# Patient Record
Sex: FEMALE | Race: WHITE | Employment: FULL TIME | URBAN - METROPOLITAN AREA
[De-identification: names, ages, dates, MRNs, and addresses within clinical notes are randomized per-mention and may not be internally consistent; named-entity substitution may affect disease eponyms.]

---

## 2017-01-05 ENCOUNTER — OFFICE VISIT (OUTPATIENT)
Dept: FAMILY MEDICINE CLINIC | Facility: CLINIC | Age: 32
End: 2017-01-05

## 2017-01-05 VITALS
SYSTOLIC BLOOD PRESSURE: 134 MMHG | DIASTOLIC BLOOD PRESSURE: 88 MMHG | WEIGHT: 293 LBS | BODY MASS INDEX: 57.75 KG/M2 | HEART RATE: 84 BPM

## 2017-01-05 DIAGNOSIS — E11.9 CONTROLLED TYPE 2 DIABETES MELLITUS WITHOUT COMPLICATION, WITHOUT LONG-TERM CURRENT USE OF INSULIN (HCC): Primary | ICD-10-CM

## 2017-01-05 DIAGNOSIS — Z30.42 ENCOUNTER FOR SURVEILLANCE OF INJECTABLE CONTRACEPTIVE: ICD-10-CM

## 2017-01-05 DIAGNOSIS — E66.01 MORBID OBESITY DUE TO EXCESS CALORIES (HCC): ICD-10-CM

## 2017-01-05 DIAGNOSIS — M54.50 ACUTE RIGHT-SIDED LOW BACK PAIN WITHOUT SCIATICA: ICD-10-CM

## 2017-01-05 DIAGNOSIS — F33.42 RECURRENT MAJOR DEPRESSIVE DISORDER, IN FULL REMISSION (HCC): ICD-10-CM

## 2017-01-05 LAB
ALBUMIN SERPL-MCNC: NORMAL G/DL
ALP BLD-CCNC: NORMAL U/L
ALT SERPL-CCNC: NORMAL U/L
AST SERPL-CCNC: NORMAL U/L
BILIRUB SERPL-MCNC: NORMAL MG/DL (ref 0.1–1.4)
BUN BLDV-MCNC: NORMAL MG/DL
CALCIUM SERPL-MCNC: NORMAL MG/DL
CHLORIDE BLD-SCNC: NORMAL MMOL/L
CHOLESTEROL, TOTAL: NORMAL MG/DL
CHOLESTEROL/HDL RATIO: NORMAL
CO2: NORMAL MMOL/L
CREAT SERPL-MCNC: NORMAL MG/DL
GFR CALCULATED: NORMAL
GLUCOSE BLD-MCNC: NORMAL MG/DL
HBA1C MFR BLD: 8.8 %
HDLC SERPL-MCNC: NORMAL MG/DL (ref 35–70)
LDL CHOLESTEROL CALCULATED: NORMAL MG/DL (ref 0–160)
POTASSIUM SERPL-SCNC: NORMAL MMOL/L
SODIUM BLD-SCNC: NORMAL MMOL/L
TOTAL PROTEIN: NORMAL
TRIGL SERPL-MCNC: NORMAL MG/DL
TSH SERPL DL<=0.05 MIU/L-ACNC: NORMAL UIU/ML
VLDLC SERPL CALC-MCNC: NORMAL MG/DL

## 2017-01-05 PROCEDURE — 99214 OFFICE O/P EST MOD 30 MIN: CPT | Performed by: FAMILY MEDICINE

## 2017-01-05 PROCEDURE — 83036 HEMOGLOBIN GLYCOSYLATED A1C: CPT | Performed by: FAMILY MEDICINE

## 2017-01-05 RX ORDER — TRAMADOL HYDROCHLORIDE 50 MG/1
TABLET ORAL
Qty: 60 TABLET | Refills: 0 | Status: SHIPPED | OUTPATIENT
Start: 2017-01-05 | End: 2019-04-25 | Stop reason: ALTCHOICE

## 2017-01-05 ASSESSMENT — ENCOUNTER SYMPTOMS
BACK PAIN: 1
COUGH: 0
BLOOD IN STOOL: 0
ABDOMINAL PAIN: 0
SHORTNESS OF BREATH: 0
EYES NEGATIVE: 1
BLURRED VISION: 0

## 2017-01-23 ENCOUNTER — TELEPHONE (OUTPATIENT)
Dept: FAMILY MEDICINE CLINIC | Facility: CLINIC | Age: 32
End: 2017-01-23

## 2017-01-23 DIAGNOSIS — E66.01 MORBID OBESITY DUE TO EXCESS CALORIES (HCC): ICD-10-CM

## 2017-01-23 DIAGNOSIS — E11.9 CONTROLLED TYPE 2 DIABETES MELLITUS WITHOUT COMPLICATION, WITHOUT LONG-TERM CURRENT USE OF INSULIN (HCC): ICD-10-CM

## 2017-02-07 ENCOUNTER — HOSPITAL ENCOUNTER (OUTPATIENT)
Dept: PHYSICAL THERAPY | Facility: CLINIC | Age: 32
Setting detail: THERAPIES SERIES
Discharge: HOME | End: 2017-02-07
Payer: COMMERCIAL

## 2017-02-07 PROCEDURE — 97110 THERAPEUTIC EXERCISES: CPT

## 2017-02-09 ENCOUNTER — HOSPITAL ENCOUNTER (OUTPATIENT)
Dept: PHYSICAL THERAPY | Facility: CLINIC | Age: 32
Setting detail: THERAPIES SERIES
Discharge: HOME | End: 2017-02-09
Payer: COMMERCIAL

## 2017-02-09 PROCEDURE — 97110 THERAPEUTIC EXERCISES: CPT

## 2017-02-14 ENCOUNTER — HOSPITAL ENCOUNTER (OUTPATIENT)
Dept: PHYSICAL THERAPY | Facility: CLINIC | Age: 32
Setting detail: THERAPIES SERIES
Discharge: HOME | End: 2017-02-14
Payer: COMMERCIAL

## 2017-02-16 ENCOUNTER — HOSPITAL ENCOUNTER (OUTPATIENT)
Dept: PHYSICAL THERAPY | Facility: CLINIC | Age: 32
Setting detail: THERAPIES SERIES
Discharge: HOME | End: 2017-02-16
Payer: COMMERCIAL

## 2017-02-16 PROCEDURE — 97110 THERAPEUTIC EXERCISES: CPT

## 2017-02-21 ENCOUNTER — HOSPITAL ENCOUNTER (OUTPATIENT)
Dept: PHYSICAL THERAPY | Facility: CLINIC | Age: 32
Setting detail: THERAPIES SERIES
Discharge: HOME | End: 2017-02-21
Payer: COMMERCIAL

## 2017-02-21 PROCEDURE — 97110 THERAPEUTIC EXERCISES: CPT

## 2017-02-23 ENCOUNTER — HOSPITAL ENCOUNTER (OUTPATIENT)
Dept: PHYSICAL THERAPY | Facility: CLINIC | Age: 32
Setting detail: THERAPIES SERIES
Discharge: HOME | End: 2017-02-23
Payer: COMMERCIAL

## 2017-02-23 PROCEDURE — 97110 THERAPEUTIC EXERCISES: CPT

## 2017-02-28 ENCOUNTER — APPOINTMENT (OUTPATIENT)
Dept: PHYSICAL THERAPY | Facility: CLINIC | Age: 32
End: 2017-02-28
Payer: COMMERCIAL

## 2017-05-05 ENCOUNTER — OFFICE VISIT (OUTPATIENT)
Dept: FAMILY MEDICINE CLINIC | Age: 32
End: 2017-05-05
Payer: COMMERCIAL

## 2017-05-05 VITALS
BODY MASS INDEX: 51.91 KG/M2 | SYSTOLIC BLOOD PRESSURE: 124 MMHG | HEART RATE: 80 BPM | DIASTOLIC BLOOD PRESSURE: 88 MMHG | HEIGHT: 63 IN | WEIGHT: 293 LBS

## 2017-05-05 DIAGNOSIS — E11.9 TYPE 2 DIABETES MELLITUS WITHOUT COMPLICATION, WITHOUT LONG-TERM CURRENT USE OF INSULIN (HCC): ICD-10-CM

## 2017-05-05 DIAGNOSIS — G43.709 CHRONIC MIGRAINE WITHOUT AURA WITHOUT STATUS MIGRAINOSUS, NOT INTRACTABLE: ICD-10-CM

## 2017-05-05 DIAGNOSIS — E11.9 CONTROLLED TYPE 2 DIABETES MELLITUS WITHOUT COMPLICATION, WITHOUT LONG-TERM CURRENT USE OF INSULIN (HCC): Primary | ICD-10-CM

## 2017-05-05 DIAGNOSIS — E66.01 MORBID OBESITY DUE TO EXCESS CALORIES (HCC): ICD-10-CM

## 2017-05-05 DIAGNOSIS — J30.2 SEASONAL ALLERGIC RHINITIS, UNSPECIFIED ALLERGIC RHINITIS TRIGGER: ICD-10-CM

## 2017-05-05 DIAGNOSIS — F34.1 DYSTHYMIA: ICD-10-CM

## 2017-05-05 LAB
CREATININE URINE POCT: NORMAL
HBA1C MFR BLD: 7.9 %
MICROALBUMIN/CREAT 24H UR: NORMAL MG/G{CREAT}
MICROALBUMIN/CREAT UR-RTO: NORMAL

## 2017-05-05 PROCEDURE — 82044 UR ALBUMIN SEMIQUANTITATIVE: CPT | Performed by: FAMILY MEDICINE

## 2017-05-05 PROCEDURE — 99214 OFFICE O/P EST MOD 30 MIN: CPT | Performed by: FAMILY MEDICINE

## 2017-05-05 PROCEDURE — 83036 HEMOGLOBIN GLYCOSYLATED A1C: CPT | Performed by: FAMILY MEDICINE

## 2017-05-05 RX ORDER — BUPROPION HYDROCHLORIDE 300 MG/1
300 TABLET ORAL DAILY
Qty: 30 TABLET | Refills: 3 | Status: SHIPPED | OUTPATIENT
Start: 2017-05-05 | End: 2018-01-09 | Stop reason: SDUPTHER

## 2017-05-05 ASSESSMENT — ENCOUNTER SYMPTOMS
SHORTNESS OF BREATH: 0
COUGH: 0
EYES NEGATIVE: 1
BLOOD IN STOOL: 0
ABDOMINAL PAIN: 0

## 2017-09-08 ENCOUNTER — OFFICE VISIT (OUTPATIENT)
Dept: FAMILY MEDICINE CLINIC | Age: 32
End: 2017-09-08
Payer: COMMERCIAL

## 2017-09-08 VITALS
HEART RATE: 90 BPM | BODY MASS INDEX: 57.04 KG/M2 | SYSTOLIC BLOOD PRESSURE: 130 MMHG | DIASTOLIC BLOOD PRESSURE: 90 MMHG | WEIGHT: 293 LBS

## 2017-09-08 DIAGNOSIS — M25.531 PAIN IN BOTH WRISTS: ICD-10-CM

## 2017-09-08 DIAGNOSIS — E66.01 MORBID OBESITY DUE TO EXCESS CALORIES (HCC): ICD-10-CM

## 2017-09-08 DIAGNOSIS — E11.9 CONTROLLED TYPE 2 DIABETES MELLITUS WITHOUT COMPLICATION, WITHOUT LONG-TERM CURRENT USE OF INSULIN (HCC): Primary | ICD-10-CM

## 2017-09-08 DIAGNOSIS — Z23 NEED FOR PNEUMOCOCCAL VACCINATION: ICD-10-CM

## 2017-09-08 DIAGNOSIS — Z23 NEED FOR INFLUENZA VACCINATION: ICD-10-CM

## 2017-09-08 DIAGNOSIS — G47.33 OBSTRUCTIVE SLEEP APNEA SYNDROME: ICD-10-CM

## 2017-09-08 DIAGNOSIS — M25.532 PAIN IN BOTH WRISTS: ICD-10-CM

## 2017-09-08 DIAGNOSIS — F34.1 DYSTHYMIA: ICD-10-CM

## 2017-09-08 LAB — HBA1C MFR BLD: 7.6 %

## 2017-09-08 PROCEDURE — 83036 HEMOGLOBIN GLYCOSYLATED A1C: CPT | Performed by: FAMILY MEDICINE

## 2017-09-08 PROCEDURE — 90471 IMMUNIZATION ADMIN: CPT | Performed by: FAMILY MEDICINE

## 2017-09-08 PROCEDURE — 90670 PCV13 VACCINE IM: CPT | Performed by: FAMILY MEDICINE

## 2017-09-08 PROCEDURE — 90472 IMMUNIZATION ADMIN EACH ADD: CPT | Performed by: FAMILY MEDICINE

## 2017-09-08 PROCEDURE — 90688 IIV4 VACCINE SPLT 0.5 ML IM: CPT | Performed by: FAMILY MEDICINE

## 2017-09-08 PROCEDURE — 99214 OFFICE O/P EST MOD 30 MIN: CPT | Performed by: FAMILY MEDICINE

## 2017-09-08 RX ORDER — LISINOPRIL 5 MG/1
5 TABLET ORAL DAILY
Qty: 30 TABLET | Refills: 5 | Status: SHIPPED | OUTPATIENT
Start: 2017-09-08 | End: 2018-09-01 | Stop reason: SDUPTHER

## 2017-09-08 ASSESSMENT — ENCOUNTER SYMPTOMS
EYES NEGATIVE: 1
CONSTIPATION: 0
BACK PAIN: 0
SHORTNESS OF BREATH: 0
COUGH: 0
ABDOMINAL PAIN: 0

## 2017-09-08 ASSESSMENT — PATIENT HEALTH QUESTIONNAIRE - PHQ9
1. LITTLE INTEREST OR PLEASURE IN DOING THINGS: 0
SUM OF ALL RESPONSES TO PHQ9 QUESTIONS 1 & 2: 0
2. FEELING DOWN, DEPRESSED OR HOPELESS: 0
SUM OF ALL RESPONSES TO PHQ QUESTIONS 1-9: 0

## 2018-01-09 ENCOUNTER — OFFICE VISIT (OUTPATIENT)
Dept: FAMILY MEDICINE CLINIC | Age: 33
End: 2018-01-09
Payer: COMMERCIAL

## 2018-01-09 VITALS
DIASTOLIC BLOOD PRESSURE: 84 MMHG | HEART RATE: 88 BPM | WEIGHT: 293 LBS | SYSTOLIC BLOOD PRESSURE: 128 MMHG | BODY MASS INDEX: 56.69 KG/M2

## 2018-01-09 DIAGNOSIS — E11.9 TYPE 2 DIABETES MELLITUS WITHOUT COMPLICATION, WITHOUT LONG-TERM CURRENT USE OF INSULIN (HCC): Primary | ICD-10-CM

## 2018-01-09 DIAGNOSIS — E66.01 MORBID OBESITY DUE TO EXCESS CALORIES (HCC): ICD-10-CM

## 2018-01-09 DIAGNOSIS — G47.33 OBSTRUCTIVE SLEEP APNEA SYNDROME: ICD-10-CM

## 2018-01-09 DIAGNOSIS — F33.42 RECURRENT MAJOR DEPRESSIVE DISORDER, IN FULL REMISSION (HCC): ICD-10-CM

## 2018-01-09 DIAGNOSIS — J30.2 CHRONIC SEASONAL ALLERGIC RHINITIS, UNSPECIFIED TRIGGER: ICD-10-CM

## 2018-01-09 LAB
ALBUMIN SERPL-MCNC: 4.2 G/DL
ALP BLD-CCNC: 81 U/L
ALT SERPL-CCNC: 95 U/L
ANION GAP SERPL CALCULATED.3IONS-SCNC: 8 MMOL/L
AST SERPL-CCNC: 44 U/L
BILIRUB SERPL-MCNC: 0.5 MG/DL (ref 0.1–1.4)
BUN BLDV-MCNC: 18 MG/DL
CALCIUM SERPL-MCNC: 9.5 MG/DL
CHLORIDE BLD-SCNC: 100 MMOL/L
CHOLESTEROL, TOTAL: 233 MG/DL
CHOLESTEROL/HDL RATIO: 5.5
CO2: 28 MMOL/L
CREAT SERPL-MCNC: 0.88 MG/DL
GFR CALCULATED: NORMAL
GLUCOSE BLD-MCNC: 285 MG/DL
HBA1C MFR BLD: 9 %
HDLC SERPL-MCNC: 42 MG/DL (ref 35–70)
LDL CHOLESTEROL CALCULATED: 138 MG/DL (ref 0–160)
POTASSIUM SERPL-SCNC: 4.1 MMOL/L
SODIUM BLD-SCNC: 136 MMOL/L
TOTAL PROTEIN: 6.7
TRIGL SERPL-MCNC: 263 MG/DL
TSH SERPL DL<=0.05 MIU/L-ACNC: 1.74 UIU/ML
VLDLC SERPL CALC-MCNC: 53 MG/DL

## 2018-01-09 PROCEDURE — G8417 CALC BMI ABV UP PARAM F/U: HCPCS | Performed by: FAMILY MEDICINE

## 2018-01-09 PROCEDURE — 99214 OFFICE O/P EST MOD 30 MIN: CPT | Performed by: FAMILY MEDICINE

## 2018-01-09 PROCEDURE — G8427 DOCREV CUR MEDS BY ELIG CLIN: HCPCS | Performed by: FAMILY MEDICINE

## 2018-01-09 PROCEDURE — 36415 COLL VENOUS BLD VENIPUNCTURE: CPT | Performed by: FAMILY MEDICINE

## 2018-01-09 PROCEDURE — 3045F PR MOST RECENT HEMOGLOBIN A1C LEVEL 7.0-9.0%: CPT | Performed by: FAMILY MEDICINE

## 2018-01-09 PROCEDURE — 83036 HEMOGLOBIN GLYCOSYLATED A1C: CPT | Performed by: FAMILY MEDICINE

## 2018-01-09 PROCEDURE — G8484 FLU IMMUNIZE NO ADMIN: HCPCS | Performed by: FAMILY MEDICINE

## 2018-01-09 PROCEDURE — 1036F TOBACCO NON-USER: CPT | Performed by: FAMILY MEDICINE

## 2018-01-09 RX ORDER — METFORMIN HYDROCHLORIDE 500 MG/1
1000 TABLET, EXTENDED RELEASE ORAL 2 TIMES DAILY
Qty: 120 TABLET | Refills: 3 | Status: SHIPPED | OUTPATIENT
Start: 2018-01-09 | End: 2018-08-10 | Stop reason: CLARIF

## 2018-01-09 RX ORDER — BUPROPION HYDROCHLORIDE 300 MG/1
300 TABLET ORAL DAILY
Qty: 30 TABLET | Refills: 5 | Status: SHIPPED | OUTPATIENT
Start: 2018-01-09 | End: 2018-10-31 | Stop reason: SDUPTHER

## 2018-01-09 ASSESSMENT — ENCOUNTER SYMPTOMS
ABDOMINAL PAIN: 0
BACK PAIN: 1
DIARRHEA: 0
COUGH: 0
VISUAL CHANGE: 0
SHORTNESS OF BREATH: 0
BLOOD IN STOOL: 0
EYES NEGATIVE: 1

## 2018-01-09 NOTE — PATIENT INSTRUCTIONS
of the reach of children, never share your medicines with others, and use this medication only for the indication prescribed. Every effort has been made to ensure that the information provided by [address] is accurate, up-to-date, and complete, but no guarantee is made to that effect. Drug information contained herein may be time sensitive. [facility] information has been compiled for use by healthcare practitioners and consumers in the [place] and therefore [facility] does not warrant that uses outside of the [place] are appropriate, unless specifically indicated otherwise. [facility]'s drug information does not endorse drugs, diagnose patients or recommend therapy. [facility]'s drug information is an informational resource designed to assist licensed healthcare practitioners in caring for their patients and/or to serve consumers viewing this service as a supplement to, and not a substitute for, the expertise, skill, knowledge and judgment of healthcare practitioners. The absence of a warning for a given drug or drug combination in no way should be construed to indicate that the drug or drug combination is safe, effective or appropriate for any given patient. [facility] does not assume any responsibility for any aspect of healthcare administered with the aid of information [facility] provides. The information contained herein is not intended to cover all possible uses, directions, precautions, warnings, drug interactions, allergic reactions, or adverse effects. If you have questions about the drugs you are taking, check with your doctor, nurse or pharmacist.  Copyright 9780-2194 [address]. Version: 9.02. Revision date: 1/25/2017. Care instructions adapted under license by [facility] ([facility]). If you have questions about a medical condition or this instruction, always ask your healthcare professional. [place] [address] any warranty or liability for your use of this information.

## 2018-01-09 NOTE — PROGRESS NOTES
[facility] PHYSICIANS  IVAN CHAMBERLAIN [facility] PRACTICE  [address] [facility] [id number] [place] [address]  Dept: [phone]    1/9/2018    CHIEF COMPLAINT    Chief Complaint   Patient presents with    Diabetes       HPI    Carina Woodall is a 29 y.o. female who presents   Chief Complaint   Patient presents with    Diabetes   . Diabetes   She presents for her follow-up diabetic visit. She has type 2 diabetes mellitus. Her disease course has been stable. There are no hypoglycemic associated symptoms. Pertinent negatives for hypoglycemia include no dizziness or nervousness/anxiousness. Pertinent negatives for diabetes include no chest pain, no fatigue, no foot paresthesias, no visual change and no weight loss. There are no hypoglycemic complications. Symptoms are stable. There are no diabetic complications. Risk factors for coronary artery disease include diabetes mellitus, obesity and sedentary lifestyle. Current diabetic treatment includes oral agent (monotherapy). She is compliant with treatment some of the time. Her weight is stable. When asked about meal planning, she reported none. She has not had a previous visit with a dietitian. She participates in exercise intermittently. An ACE inhibitor/angiotensin II receptor blocker is being taken. She does not see a podiatrist.Eye exam is current. REVIEW OF SYSTEMS    Review of Systems   Constitutional: Negative for fatigue, fever, malaise/fatigue and weight loss. HENT: Negative. Eyes: Negative. Goes to eye dr   Respiratory: Negative for cough and shortness of breath. Cardiovascular: Negative for chest pain and leg swelling. Gastrointestinal: Negative for abdominal pain, blood in stool and diarrhea. Genitourinary: Negative for frequency and urgency. Musculoskeletal: Positive for back pain (intermittent). Skin: Negative. Neurological: Negative for dizziness and sensory change. Endo/Heme/Allergies: Negative. Psychiatric/Behavioral: Negative for depression. The patient is not nervous/anxious and does not have insomnia.          Mood stable on med       PAST MEDICAL HISTORY    Past Medical History:   Diagnosis Date    Depression     Diabetes mellitus ([address]     Migraines     Obesity     Seasonal allergies     Type 2 diabetes mellitus without complication (HCC)        FAMILY HISTORY    Family History   Problem Relation Age of Onset    High Blood Pressure Mother     Breast Cancer Paternal Aunt     Heart Attack Maternal Grandfather     High Blood Pressure Maternal Grandfather     Stroke Maternal Grandfather        SOCIAL HISTORY    Social History     Social History    Marital status:      Spouse name: N/A    Number of children: N/A    Years of education: N/A     Social History Main Topics    Smoking status: Former Smoker    Smokeless tobacco: Never Used    Alcohol use Yes      Comment: social    Drug use: No    Sexual activity: Yes     Partners: Male     Other Topics Concern    None     Social History Narrative    None       SURGICAL HISTORY    Past Surgical History:   Procedure Laterality Date     SECTION      WRIST GANGLION EXCISION      lt wrist age 7yrs       CURRENT MEDICATIONS    Current Outpatient Prescriptions   Medication Sig Dispense Refill    metFORMIN (GLUCOPHAGE-XR) 500 MG extended release tablet Take 2 tablets by mouth 2 times daily 120 tablet 3    SITagliptin (JANUVIA) 50 MG tablet Take 1 tablet by mouth daily 30 tablet 5    buPROPion (WELLBUTRIN XL) 300 MG extended release tablet Take 1 tablet by mouth daily Unsure of dose 30 tablet 5    lisinopril (PRINIVIL;ZESTRIL) 5 MG tablet Take 1 tablet by mouth daily 30 tablet 5    naproxen (NAPROSYN) 500 MG tablet TAKE ONE TABLET BY MOUTH TWICE A DAY WITH FOOD 60 tablet 2    Cetirizine HCl (ZYRTEC PO) Take by mouth daily      Butalbital-APAP-Caffeine (FIORICET PO) Take by mouth as needed      traMADol (ULTRAM) 50 MG tablet 1-2 every 6-8 hrs prn 60 tablet 0     No current facility-administered medications for this visit. ALLERGIES    Allergies   Allergen Reactions    Penicillins        PHYSICAL EXAM   Physical Exam   Constitutional: She is oriented to person, place, and time. She appears well-developed and well-nourished. Severely obese   HENT:   Head: Normocephalic. Mouth/Throat: Oropharynx is clear and moist.   Eyes: Pupils are equal, round, and reactive to light. Neck: Normal range of motion. Neck supple. No thyromegaly present. Cardiovascular: Normal rate, regular rhythm and normal heart sounds. No murmur heard. Pulmonary/Chest: Effort normal and breath sounds normal. She has no wheezes. She has no rales. Abdominal: Soft. There is no tenderness. There is no rebound and no guarding. obese   Musculoskeletal: Normal range of motion. She exhibits no edema, tenderness or deformity. Lymphadenopathy:     She has no cervical adenopathy. Neurological: She is alert and oriented to person, place, and time. Skin: Skin is warm and dry. Psychiatric: She has a normal mood and affect. Her behavior is normal.   Vitals reviewed. Assessment/PLan  1. Type 2 diabetes mellitus without complication, without long-term current use of insulin (HCC)  Chronic, not well controlled. Add [place] [facility] [place]. Encouraged getting back to healthy diet and regular exercise. - POCT glycosylated hemoglobin (Hb A1C)  - Lipid Panel; Future  - Comprehensive Metabolic Panel; Future  - metFORMIN (GLUCOPHAGE-XR) 500 MG extended release tablet; Take 2 tablets by mouth 2 times daily  Dispense: 120 tablet; Refill: 3  - SITagliptin (JANUVIA) 50 MG tablet; Take 1 tablet by mouth daily  Dispense: 30 tablet; Refill: 5    2. Morbid obesity due to excess calories (HCC)  Chronic, not well controlled. - Lipid Panel; Future  - Comprehensive Metabolic Panel; Future  - TSH with Reflex; Future    3.  Recurrent major depressive disorder, in full remission ([facility]  Chronic, stable on med. 4. Chronic seasonal allergic rhinitis, unspecified trigger  Chronic, cont zyrtec. 5. Obstructive sleep apnea syndrome  Chronic, cont cpap      Pao Dalal received counseling on the following healthy behaviors: nutrition, exercise and medication adherence  Reviewed prior labs and health maintenance  Continue current medications, diet and exercise. Discussed use, benefit, and side effects of prescribed medications. Barriers to medication compliance addressed. Patient given educational materials - see patient instructions  Was a self-tracking handout given in paper form or via [facility]? Yes    Requested Prescriptions     Signed Prescriptions Disp Refills    metFORMIN (GLUCOPHAGE-XR) 500 MG extended release tablet 120 tablet 3     Sig: Take 2 tablets by mouth 2 times daily    SITagliptin (JANUVIA) 50 MG tablet 30 tablet 5     Sig: Take 1 tablet by mouth daily    buPROPion (WELLBUTRIN XL) 300 MG extended release tablet 30 tablet 5     Sig: Take 1 tablet by mouth daily Unsure of dose       All patient questions answered. Patient voiced understanding. Quality Measures    Body mass index is 56.69 kg/m². Elevated. Weight control planned discussed Healthy diet and regular exercise. BP: 128/84 Blood pressure is normal. Treatment plan consists of Weight Reduction, Increased Physical Activity and No treatment change needed. No results found for: LDLCALC, LDLCHOLESTEROL, LDLDIRECT (goal LDL reduction with dx if diabetes is 50% LDL reduction)      PHQ Scores 9/8/2017   PHQ2 Score 0   PHQ9 Score 0     Interpretation of Total Score Depression Severity: 1-4 = Minimal depression, 5-9 = Mild depression, 10-14 = Moderate depression, 15-19 = Moderately severe depression, 20-27 = Severe depression     Return in about 4 months (around 5/9/2018) for Return for diabetes check, return for weight check.     Visual inspection:  Deformity/amputation: absent  Skin lesions/pre-ulcerative calluses: absent  Edema: right- negative, left- negative    Sensory exam:  Monofilament sensation: normal  (minimum of 5 random plantar locations tested, avoiding callused areas - > 1 area with absence of sensation is + for neuropathy)    Plus at least one of the following:  Pulses: normal,   Pinprick: Intact  Proprioception: Intact  Vibration (128 Hz): N/A    Electronically signed by Kiet Mejía MD on 1/9/18 at 9:55 AM

## 2018-01-18 ENCOUNTER — TELEPHONE (OUTPATIENT)
Dept: FAMILY MEDICINE CLINIC | Age: 33
End: 2018-01-18

## 2018-01-18 DIAGNOSIS — F51.01 PRIMARY INSOMNIA: Primary | ICD-10-CM

## 2018-01-18 RX ORDER — ZOLPIDEM TARTRATE 5 MG/1
5 TABLET ORAL NIGHTLY PRN
Qty: 30 TABLET | Refills: 0 | Status: SHIPPED | OUTPATIENT
Start: 2018-01-18 | End: 2018-02-01

## 2018-05-07 DIAGNOSIS — E66.01 MORBID OBESITY DUE TO EXCESS CALORIES (HCC): ICD-10-CM

## 2018-05-07 DIAGNOSIS — E11.9 TYPE 2 DIABETES MELLITUS WITHOUT COMPLICATION, WITHOUT LONG-TERM CURRENT USE OF INSULIN (HCC): ICD-10-CM

## 2018-05-10 ENCOUNTER — OFFICE VISIT (OUTPATIENT)
Dept: FAMILY MEDICINE CLINIC | Age: 33
End: 2018-05-10
Payer: COMMERCIAL

## 2018-05-10 VITALS
SYSTOLIC BLOOD PRESSURE: 110 MMHG | DIASTOLIC BLOOD PRESSURE: 82 MMHG | WEIGHT: 293 LBS | BODY MASS INDEX: 51.91 KG/M2 | HEIGHT: 63 IN | HEART RATE: 88 BPM

## 2018-05-10 DIAGNOSIS — E66.01 MORBID OBESITY DUE TO EXCESS CALORIES (HCC): ICD-10-CM

## 2018-05-10 DIAGNOSIS — E11.9 CONTROLLED TYPE 2 DIABETES MELLITUS WITHOUT COMPLICATION, WITHOUT LONG-TERM CURRENT USE OF INSULIN (HCC): Primary | ICD-10-CM

## 2018-05-10 DIAGNOSIS — F33.42 RECURRENT MAJOR DEPRESSIVE DISORDER, IN FULL REMISSION (HCC): ICD-10-CM

## 2018-05-10 LAB
GLUCOSE BLD-MCNC: 212 MG/DL
HBA1C MFR BLD: 9.4 %

## 2018-05-10 PROCEDURE — 3046F HEMOGLOBIN A1C LEVEL >9.0%: CPT | Performed by: FAMILY MEDICINE

## 2018-05-10 PROCEDURE — 2022F DILAT RTA XM EVC RTNOPTHY: CPT | Performed by: FAMILY MEDICINE

## 2018-05-10 PROCEDURE — G8417 CALC BMI ABV UP PARAM F/U: HCPCS | Performed by: FAMILY MEDICINE

## 2018-05-10 PROCEDURE — 83036 HEMOGLOBIN GLYCOSYLATED A1C: CPT | Performed by: FAMILY MEDICINE

## 2018-05-10 PROCEDURE — 1036F TOBACCO NON-USER: CPT | Performed by: FAMILY MEDICINE

## 2018-05-10 PROCEDURE — 82962 GLUCOSE BLOOD TEST: CPT | Performed by: FAMILY MEDICINE

## 2018-05-10 PROCEDURE — G8427 DOCREV CUR MEDS BY ELIG CLIN: HCPCS | Performed by: FAMILY MEDICINE

## 2018-05-10 PROCEDURE — 99214 OFFICE O/P EST MOD 30 MIN: CPT | Performed by: FAMILY MEDICINE

## 2018-05-10 ASSESSMENT — ENCOUNTER SYMPTOMS
SHORTNESS OF BREATH: 0
BLOOD IN STOOL: 0
EYES NEGATIVE: 1
ABDOMINAL PAIN: 0
COUGH: 0

## 2018-07-13 ENCOUNTER — OFFICE VISIT (OUTPATIENT)
Dept: FAMILY MEDICINE CLINIC | Age: 33
End: 2018-07-13
Payer: COMMERCIAL

## 2018-07-13 ENCOUNTER — TELEPHONE (OUTPATIENT)
Dept: FAMILY MEDICINE CLINIC | Age: 33
End: 2018-07-13

## 2018-07-13 VITALS
WEIGHT: 293 LBS | HEART RATE: 80 BPM | DIASTOLIC BLOOD PRESSURE: 84 MMHG | BODY MASS INDEX: 55.98 KG/M2 | SYSTOLIC BLOOD PRESSURE: 110 MMHG

## 2018-07-13 DIAGNOSIS — R81 GLYCOSURIA: ICD-10-CM

## 2018-07-13 DIAGNOSIS — R10.9 ABDOMINAL PAIN, UNSPECIFIED ABDOMINAL LOCATION: Primary | ICD-10-CM

## 2018-07-13 DIAGNOSIS — E66.01 MORBID OBESITY DUE TO EXCESS CALORIES (HCC): ICD-10-CM

## 2018-07-13 PROBLEM — E11.9 CONTROLLED TYPE 2 DIABETES MELLITUS WITHOUT COMPLICATION, WITHOUT LONG-TERM CURRENT USE OF INSULIN (HCC): Status: RESOLVED | Noted: 2017-01-05 | Resolved: 2018-07-13

## 2018-07-13 LAB
BILIRUBIN, POC: NORMAL
BLOOD URINE, POC: NORMAL
CLARITY, POC: CLEAR
COLOR, POC: YELLOW
CREATININE URINE POCT: 100
GLUCOSE BLD-MCNC: 310 MG/DL
GLUCOSE URINE, POC: 500
KETONES, POC: NORMAL
LEUKOCYTE EST, POC: NORMAL
MICROALBUMIN/CREAT 24H UR: 30 MG/G{CREAT}
MICROALBUMIN/CREAT UR-RTO: <30
NITRITE, POC: NORMAL
PH, POC: 5.5
PROTEIN, POC: NORMAL
SPECIFIC GRAVITY, POC: 1.02
UROBILINOGEN, POC: 0.2

## 2018-07-13 PROCEDURE — 82044 UR ALBUMIN SEMIQUANTITATIVE: CPT | Performed by: FAMILY MEDICINE

## 2018-07-13 PROCEDURE — G8417 CALC BMI ABV UP PARAM F/U: HCPCS | Performed by: FAMILY MEDICINE

## 2018-07-13 PROCEDURE — G8427 DOCREV CUR MEDS BY ELIG CLIN: HCPCS | Performed by: FAMILY MEDICINE

## 2018-07-13 PROCEDURE — 82962 GLUCOSE BLOOD TEST: CPT | Performed by: FAMILY MEDICINE

## 2018-07-13 PROCEDURE — 81003 URINALYSIS AUTO W/O SCOPE: CPT | Performed by: FAMILY MEDICINE

## 2018-07-13 PROCEDURE — 99214 OFFICE O/P EST MOD 30 MIN: CPT | Performed by: FAMILY MEDICINE

## 2018-07-13 PROCEDURE — 3046F HEMOGLOBIN A1C LEVEL >9.0%: CPT | Performed by: FAMILY MEDICINE

## 2018-07-13 PROCEDURE — 2022F DILAT RTA XM EVC RTNOPTHY: CPT | Performed by: FAMILY MEDICINE

## 2018-07-13 PROCEDURE — 1036F TOBACCO NON-USER: CPT | Performed by: FAMILY MEDICINE

## 2018-07-13 RX ORDER — ZOLPIDEM TARTRATE 5 MG/1
5 TABLET ORAL NIGHTLY PRN
COMMUNITY
End: 2020-08-25 | Stop reason: ALTCHOICE

## 2018-07-13 ASSESSMENT — ENCOUNTER SYMPTOMS
SHORTNESS OF BREATH: 0
CONSTIPATION: 0
EYES NEGATIVE: 1
BLOOD IN STOOL: 0
COUGH: 0
ABDOMINAL PAIN: 1
HEMATOCHEZIA: 0

## 2018-07-13 NOTE — PATIENT INSTRUCTIONS
smaller amounts or for longer than recommended. A dose counter on the injection pen shows your dose in units. Do not convert your dose. Insulin is injected under the skin. You will be shown how to use injections at home. Do not give yourself this medicine if you do not understand how to use the injection and properly dispose of used needles and syringes. Insulin degludec must not be given with an insulin pump, or mixed with other insulins. Do not inject insulin degludec into a vein or a muscle. Insulin degludec is usually given once daily, at any time of the day. Follow your doctor's dosing instructions very carefully. Your care provider will show you the best places on your body to inject insulin degludec. Use a different place each time you give an injection. Do not inject into the same place two times in a row. Use only the prefilled injection pen that comes with this medicine. Attach a new needle before each use. Do not transfer the insulin from the pen into a syringe. Never share an injection pen with another person, even if the needle has been changed. Sharing these devices can allow infections or disease to pass from one person to another. Use a disposable needle and syringe only once. Follow any state or local laws about throwing away used needles and syringes. Use a puncture-proof \"sharps\" disposal container (ask your pharmacist where to get one and how to throw it away). Keep this container out of the reach of children and pets. Low blood sugar (hypoglycemia) can happen to everyone who has diabetes. Symptoms include headache, hunger, sweating, irritability, dizziness, nausea, fast heart rate, and feeling anxious or shaky. To quickly treat low blood sugar, always keep a fast-acting source of sugar with you such as fruit juice, hard candy, crackers, raisins, or non-diet soda.   Your doctor can prescribe a glucagon emergency injection kit to use in case you have severe hypoglycemia and cannot eat or

## 2018-07-13 NOTE — PROGRESS NOTES
Gastrointestinal: Positive for abdominal pain. Negative for blood in stool, constipation and hematochezia. Genitourinary: Negative for frequency and urgency. Musculoskeletal: Negative. Skin: Negative. Neurological: Negative for dizziness, sensory change and headaches. Endo/Heme/Allergies: Negative. Psychiatric/Behavioral: Negative for depression. The patient is not nervous/anxious and does not have insomnia. PAST MEDICAL HISTORY    Past Medical History:   Diagnosis Date    Depression     Diabetes mellitus ([address]     Migraines     Obesity     Seasonal allergies     Type 2 diabetes mellitus without complication ([facility])        FAMILY HISTORY    Family History   Problem Relation Age of Onset    High Blood Pressure Mother     Breast Cancer Paternal Aunt     Heart Attack Maternal Grandfather     High Blood Pressure Maternal Grandfather     Stroke Maternal Grandfather        SOCIAL HISTORY    Social History     Social History    Marital status:      Spouse name: N/A    Number of children: N/A    Years of education: N/A     Social History Main Topics    Smoking status: Former Smoker    Smokeless tobacco: Never Used    Alcohol use Yes      Comment: social    Drug use: No    Sexual activity: Yes     Partners: Male     Other Topics Concern    None     Social History Narrative    None       SURGICAL HISTORY    Past Surgical History:   Procedure Laterality Date     SECTION      WRIST GANGLION EXCISION      lt wrist age 7yrs       CURRENT MEDICATIONS    Current Outpatient Prescriptions   Medication Sig Dispense Refill    zolpidem (AMBIEN) 5 MG tablet Take 5 mg by mouth nightly as needed for Sleep. Mike Deutsch SITagliptin-metFORMIN HCl -1000 MG TB24 Take 1 tablet by mouth daily 30 tablet 5    Insulin Degludec (TRESIBA FLEXTOUCH) 100 UNIT/ML SOPN Inject 10 Units into the skin daily Increase to 20 units after one week 5 pen 1    glucose blood VI test strips (TRUETEST TEST) strip 1 each by In Vitro route 3 times daily As needed. 100 each 1    naproxen (NAPROSYN) 500 MG tablet TAKE ONE TABLET BY MOUTH TWICE A DAY WITH FOOD 60 tablet 1    metFORMIN (GLUCOPHAGE-XR) 500 MG extended release tablet Take 2 tablets by mouth 2 times daily 120 tablet 3    SITagliptin (JANUVIA) 50 MG tablet Take 1 tablet by mouth daily 30 tablet 5    buPROPion (WELLBUTRIN XL) 300 MG extended release tablet Take 1 tablet by mouth daily Unsure of dose 30 tablet 5    lisinopril (PRINIVIL;ZESTRIL) 5 MG tablet Take 1 tablet by mouth daily 30 tablet 5    traMADol (ULTRAM) 50 MG tablet 1-2 every 6-8 hrs prn 60 tablet 0    Cetirizine HCl (ZYRTEC PO) Take by mouth daily      Butalbital-APAP-Caffeine (FIORICET PO) Take by mouth as needed       No current facility-administered medications for this visit. ALLERGIES    Allergies   Allergen Reactions    Penicillins        PHYSICAL EXAM   Physical Exam   Constitutional: She is oriented to person, place, and time. She appears well-developed and well-nourished. obese   HENT:   Head: Normocephalic. Mouth/Throat: Oropharynx is clear and moist.   Eyes: Pupils are equal, round, and reactive to light. Neck: Normal range of motion. Neck supple. Cardiovascular: Normal rate, regular rhythm and normal heart sounds. No murmur heard. Pulmonary/Chest: Effort normal and breath sounds normal. She has no wheezes. She has no rales. Abdominal: Soft. There is tenderness (rt lower quad toward midline). There is no rebound and no guarding. No cva tenderness   Musculoskeletal: Normal range of motion. She exhibits no edema, tenderness or deformity. Lymphadenopathy:     She has no cervical adenopathy. Neurological: She is alert and oriented to person, place, and time. Skin: Skin is warm and dry. Psychiatric: She has a normal mood and affect. Her behavior is normal.   Vitals reviewed. Assessment/PLan  1.  Abdominal pain, unspecified abdominal location  Recurrent, cause not apparent, discussed sx still could be endometriosis or adhesions which do not show on US.   - POCT Urinalysis No Micro (Auto)-high glucose. - CT ABDOMEN PELVIS W WO CONTRAST Additional Contrast? Radiologist Recommendation; Future    2. Uncontrolled type 2 diabetes mellitus without complication, without long-term current use of insulin (HCC)  bs over 300. Discussed need to start basal insulin which she agreed to. Instructed in use of pen and 10 units injected. Cont oral med, diet, exercise, weight loss efforts.   - SITagliptin-metFORMIN HCl -1000 MG TB24; Take 1 tablet by mouth daily  Dispense: 30 tablet; Refill: 5  - Insulin Degludec (TRESIBA FLEXTOUCH) 100 UNIT/ML SOPN; Inject 10 Units into the skin daily Increase to 20 units after one week  Dispense: 5 pen; Refill: 1  - DME Order for Glucometer as OP    3. Glycosuria  As above. - POCT Glucose-over 300      Isha received counseling on the following healthy behaviors: nutrition, exercise and medication adherence  Reviewed prior labs and health maintenance. Continue current medications, diet and exercise. Discussed use, benefit, and side effects of prescribed medications. Barriers to medication compliance addressed. Patient given educational materials - see patient instructions. All patient questions answered. Patient voiced understanding. Return abd pain, for Return for diabetes check.         Electronically signed by Donna Cabrera MD on 7/13/18 at 10:08 AM

## 2018-07-16 ENCOUNTER — TELEPHONE (OUTPATIENT)
Dept: FAMILY MEDICINE CLINIC | Age: 33
End: 2018-07-16

## 2018-07-16 DIAGNOSIS — R10.9 ACUTE ABDOMINAL PAIN: Primary | ICD-10-CM

## 2018-07-16 NOTE — TELEPHONE ENCOUNTER
[facility] faxed over a request stating they need the ct abd/pel with OR without please fax back to them

## 2018-07-17 ENCOUNTER — TELEPHONE (OUTPATIENT)
Dept: FAMILY MEDICINE CLINIC | Age: 33
End: 2018-07-17

## 2018-07-17 DIAGNOSIS — R10.9 ACUTE ABDOMINAL PAIN: Primary | ICD-10-CM

## 2018-07-17 NOTE — TELEPHONE ENCOUNTER
Pt states that her primary insurance will cover [place] and is willing to pay $20 co-pay. She is calling [facility] to let them know, and to see if the last script is still active.    [facility]    Also her order for glucometer and test strips, lancets was faxed to [facility] today      Health Maintenance   Topic Date Due    Diabetic foot exam  06/06/1997    Cervical cancer screen  04/22/2018    Pneumococcal med risk (1 of 1 - PPSV23) 09/08/2018 (Originally 6/6/2006)    A1C test (Diabetic or Prediabetic)  08/10/2018    Flu vaccine (1) 09/01/2018    Diabetic retinal exam  11/20/2018    Lipid screen  01/09/2019    Potassium monitoring  01/09/2019    Creatinine monitoring  01/09/2019    Diabetic microalbuminuria test  07/13/2019    DTaP/Tdap/Td vaccine (2 - Td) 10/31/2024    HIV screen  Completed             (applicable per patient's age: Cancer Screenings, Depression Screening, Fall Risk Screening, Immunizations)    Hemoglobin A1C (%)   Date Value   05/10/2018 9.4   01/09/2018 9.0   09/08/2017 7.6     LDL Calculated (mg/dL)   Date Value   01/09/2018 138 (A)     AST (U/L)   Date Value   01/09/2018 44     ALT (U/L)   Date Value   01/09/2018 95     BUN (mg/dL)   Date Value   01/09/2018 18      (goal A1C is < 7)   (goal LDL is <100) need 30-50% reduction from baseline     BP Readings from Last 3 Encounters:   07/13/18 110/84   05/10/18 110/82   01/09/18 128/84    (goal /80)      All Future Testing planned in [facility]:  Lab Frequency Next Occurrence   CT ABDOMEN PELVIS W WO CONTRAST Additional Contrast? Radiologist Recommendation Once 07/13/2018   CT ABDOMEN PELVIS W WO CONTRAST Additional Contrast? Radiologist Recommendation Once 01/16/2019   MISCELLANEOUS IMAGING ORDER Once 07/24/2018       Next Visit Date:  Future Appointments  Date Time Provider Roni Cordero   7/24/2018 3:00 PM STA CT SCAN RM 1 [facility] CT SCAN STA Radiolog   8/10/2018 9:30 AM [facility]            Patient

## 2018-07-17 NOTE — TELEPHONE ENCOUNTER
Ela from [address] [place] [address] stated that an order for a glucometer, lancets, and test strips came over to the pharmacy but the order was not signed. Pharmacy wanted to know if they could get a verbal for the script.  I had a request from the patient to send in a script for pen needles which I verbally attached this to the order and told the pharmacy to give 3 refills for everything except the glucometer per Dr Paige Booth approval

## 2018-07-24 ENCOUNTER — HOSPITAL ENCOUNTER (OUTPATIENT)
Dept: CT IMAGING | Age: 33
Discharge: HOME | End: 2018-07-26
Payer: COMMERCIAL

## 2018-07-24 DIAGNOSIS — R10.9 ACUTE ABDOMINAL PAIN: ICD-10-CM

## 2018-07-24 LAB
CREAT SERPL-MCNC: 0.69 MG/DL (ref 0.5–0.9)
GFR AFRICAN AMERICAN: >60 ML/MIN
GFR NON-AFRICAN AMERICAN: >60 ML/MIN
GFR SERPL CREATININE-BSD FRML MDRD: NORMAL ML/MIN/{1.73_M2}
GFR SERPL CREATININE-BSD FRML MDRD: NORMAL ML/MIN/{1.73_M2}

## 2018-07-24 PROCEDURE — 74177 CT ABD & PELVIS W/CONTRAST: CPT

## 2018-07-24 PROCEDURE — 6360000004 HC RX CONTRAST MEDICATION: Performed by: FAMILY MEDICINE

## 2018-07-24 PROCEDURE — 82565 ASSAY OF CREATININE: CPT

## 2018-07-24 PROCEDURE — 2580000003 HC RX 258: Performed by: FAMILY MEDICINE

## 2018-07-24 PROCEDURE — 36415 COLL VENOUS BLD VENIPUNCTURE: CPT

## 2018-07-24 RX ORDER — 0.9 % SODIUM CHLORIDE 0.9 %
80 INTRAVENOUS SOLUTION INTRAVENOUS ONCE
Status: COMPLETED | OUTPATIENT
Start: 2018-07-24 | End: 2018-07-24

## 2018-07-24 RX ORDER — SODIUM CHLORIDE 0.9 % (FLUSH) 0.9 %
10 SYRINGE (ML) INJECTION PRN
Status: DISCONTINUED | OUTPATIENT
Start: 2018-07-24 | End: 2018-07-27 | Stop reason: HOSPADM

## 2018-07-24 RX ADMIN — IOHEXOL 50 ML: 240 INJECTION, SOLUTION INTRATHECAL; INTRAVASCULAR; INTRAVENOUS; ORAL at 15:39

## 2018-07-24 RX ADMIN — SODIUM CHLORIDE 80 ML: 9 INJECTION, SOLUTION INTRAVENOUS at 15:29

## 2018-07-24 RX ADMIN — Medication 10 ML: at 15:29

## 2018-07-24 RX ADMIN — IOPAMIDOL 75 ML: 755 INJECTION, SOLUTION INTRAVENOUS at 15:29

## 2018-07-30 ENCOUNTER — TELEPHONE (OUTPATIENT)
Dept: FAMILY MEDICINE CLINIC | Age: 33
End: 2018-07-30

## 2018-07-30 DIAGNOSIS — R10.30 LOWER ABDOMINAL PAIN: Primary | ICD-10-CM

## 2018-07-30 NOTE — TELEPHONE ENCOUNTER
It seems to be pelvic pain, I would suggest she follow up with her gyn next.  Other option is GI consult

## 2018-08-10 ENCOUNTER — OFFICE VISIT (OUTPATIENT)
Dept: FAMILY MEDICINE CLINIC | Age: 33
End: 2018-08-10
Payer: COMMERCIAL

## 2018-08-10 VITALS
HEART RATE: 78 BPM | BODY MASS INDEX: 55.62 KG/M2 | WEIGHT: 293 LBS | SYSTOLIC BLOOD PRESSURE: 110 MMHG | DIASTOLIC BLOOD PRESSURE: 86 MMHG

## 2018-08-10 DIAGNOSIS — F33.42 RECURRENT MAJOR DEPRESSIVE DISORDER, IN FULL REMISSION (HCC): ICD-10-CM

## 2018-08-10 DIAGNOSIS — E11.9 CONTROLLED TYPE 2 DIABETES MELLITUS WITHOUT COMPLICATION, WITH LONG-TERM CURRENT USE OF INSULIN (HCC): Primary | ICD-10-CM

## 2018-08-10 DIAGNOSIS — Z79.4 CONTROLLED TYPE 2 DIABETES MELLITUS WITHOUT COMPLICATION, WITH LONG-TERM CURRENT USE OF INSULIN (HCC): Primary | ICD-10-CM

## 2018-08-10 DIAGNOSIS — E66.01 MORBID OBESITY DUE TO EXCESS CALORIES (HCC): ICD-10-CM

## 2018-08-10 DIAGNOSIS — R10.2 PELVIC PAIN IN FEMALE: ICD-10-CM

## 2018-08-10 LAB — HBA1C MFR BLD: 8.6 %

## 2018-08-10 PROCEDURE — 2022F DILAT RTA XM EVC RTNOPTHY: CPT | Performed by: FAMILY MEDICINE

## 2018-08-10 PROCEDURE — 3045F PR MOST RECENT HEMOGLOBIN A1C LEVEL 7.0-9.0%: CPT | Performed by: FAMILY MEDICINE

## 2018-08-10 PROCEDURE — 1036F TOBACCO NON-USER: CPT | Performed by: FAMILY MEDICINE

## 2018-08-10 PROCEDURE — 83036 HEMOGLOBIN GLYCOSYLATED A1C: CPT | Performed by: FAMILY MEDICINE

## 2018-08-10 PROCEDURE — 99214 OFFICE O/P EST MOD 30 MIN: CPT | Performed by: FAMILY MEDICINE

## 2018-08-10 PROCEDURE — G8427 DOCREV CUR MEDS BY ELIG CLIN: HCPCS | Performed by: FAMILY MEDICINE

## 2018-08-10 PROCEDURE — G8417 CALC BMI ABV UP PARAM F/U: HCPCS | Performed by: FAMILY MEDICINE

## 2018-08-10 ASSESSMENT — ENCOUNTER SYMPTOMS
DIARRHEA: 0
SHORTNESS OF BREATH: 0
BLOOD IN STOOL: 0
EYES NEGATIVE: 1
CONSTIPATION: 0
COUGH: 0
ABDOMINAL PAIN: 1

## 2018-08-10 NOTE — PROGRESS NOTES
1 tablet by mouth daily 30 tablet 5    buPROPion (WELLBUTRIN XL) 300 MG extended release tablet Take 1 tablet by mouth daily Unsure of dose 30 tablet 5    zolpidem (AMBIEN) 5 MG tablet Take 5 mg by mouth nightly as needed for Sleep. .      glucose blood VI test strips (TRUETEST TEST) strip 1 each by In Vitro route 3 times daily As needed. 100 each 1    naproxen (NAPROSYN) 500 MG tablet TAKE ONE TABLET BY MOUTH TWICE A DAY WITH FOOD 60 tablet 1    lisinopril (PRINIVIL;ZESTRIL) 5 MG tablet Take 1 tablet by mouth daily 30 tablet 5    traMADol (ULTRAM) 50 MG tablet 1-2 every 6-8 hrs prn 60 tablet 0    Cetirizine HCl (ZYRTEC PO) Take by mouth daily      Butalbital-APAP-Caffeine (FIORICET PO) Take by mouth as needed       No current facility-administered medications for this visit. ALLERGIES    Allergies   Allergen Reactions    Penicillins        PHYSICAL EXAM   Physical Exam   Constitutional: She is oriented to person, place, and time. She appears well-developed and well-nourished. Morbid obesity   HENT:   Head: Normocephalic. Mouth/Throat: Oropharynx is clear and moist.   Eyes: Pupils are equal, round, and reactive to light. Neck: Normal range of motion. Neck supple. Cardiovascular: Normal rate, regular rhythm and normal heart sounds. No murmur heard. Pulmonary/Chest: Effort normal and breath sounds normal. She has no wheezes. She has no rales. Abdominal: Soft. There is tenderness (rt and left lower quads). There is no rebound and no guarding. Musculoskeletal: Normal range of motion. She exhibits no edema, tenderness or deformity. Lymphadenopathy:     She has no cervical adenopathy. Neurological: She is alert and oriented to person, place, and time. Skin: Skin is warm and dry. Psychiatric: She has a normal mood and affect. Her behavior is normal.   Vitals reviewed. Assessment/PLan  1.  Controlled type 2 diabetes mellitus without complication, with long-term current use of insulin (HCC)  Chronic, stable, continue current medication and/or plan  A1C improved to 8.6. Increase tresiba by 10 units. Cont diet and exercise. - POCT glycosylated hemoglobin (Hb A1C)  - Insulin Degludec (TRESIBA FLEXTOUCH) 100 UNIT/ML SOPN; Inject 30 Units into the skin daily  Dispense: 5 pen; Refill: 1    2. Pelvic pain in female  See gyn and discuss possible laparoscopy    3. Morbid obesity due to excess calories ([facility] [address]  Cont weight loss efforts. 4. Recurrent major depressive disorder, in full remission ([address]  Chronic, stable, continue current medication and/or plan        Dasia Tamayo received counseling on the following healthy behaviors: nutrition, exercise and medication adherence  Reviewed prior labs and health maintenance. Continue current medications, diet and exercise. Discussed use, benefit, and side effects of prescribed medications. Barriers to medication compliance addressed. Patient given educational materials - see patient instructions. All patient questions answered. Patient voiced understanding. Return in about 4 months (around 12/10/2018) for return for weight check, Return for diabetes check.     Visual inspection:  Deformity/amputation: absent  Skin lesions/pre-ulcerative calluses: absent  Edema: right- negative, left- negative    Sensory exam:  Monofilament sensation: normal  (minimum of 5 random plantar locations tested, avoiding callused areas - > 1 area with absence of sensation is + for neuropathy)    Plus at least one of the following:  Pulses: normal,   Pinprick: Intact  Proprioception: Intact  Vibration (128 Hz): N/A    Electronically signed by Kade Redding MD on 8/10/18 at 9:39 AM

## 2018-09-01 DIAGNOSIS — E11.9 CONTROLLED TYPE 2 DIABETES MELLITUS WITHOUT COMPLICATION, WITHOUT LONG-TERM CURRENT USE OF INSULIN (HCC): ICD-10-CM

## 2018-09-02 RX ORDER — LISINOPRIL 5 MG/1
TABLET ORAL
Qty: 30 TABLET | Refills: 4 | Status: SHIPPED | OUTPATIENT
Start: 2018-09-02 | End: 2019-05-12 | Stop reason: SDUPTHER

## 2018-09-10 ENCOUNTER — HOSPITAL ENCOUNTER (OUTPATIENT)
Dept: PREADMISSION TESTING | Age: 33
Discharge: HOME | End: 2018-09-14
Payer: COMMERCIAL

## 2018-09-10 VITALS
DIASTOLIC BLOOD PRESSURE: 83 MMHG | HEART RATE: 85 BPM | BODY MASS INDEX: 51.91 KG/M2 | OXYGEN SATURATION: 98 % | HEIGHT: 63 IN | SYSTOLIC BLOOD PRESSURE: 143 MMHG | WEIGHT: 293 LBS | RESPIRATION RATE: 18 BRPM

## 2018-09-10 LAB
ANION GAP SERPL CALCULATED.3IONS-SCNC: 13 MMOL/L (ref 9–17)
BUN BLDV-MCNC: 14 MG/DL (ref 6–20)
BUN/CREAT BLD: 19 (ref 9–20)
CALCIUM SERPL-MCNC: 9.3 MG/DL (ref 8.6–10.4)
CHLORIDE BLD-SCNC: 101 MMOL/L (ref 98–107)
CO2: 24 MMOL/L (ref 20–31)
CREAT SERPL-MCNC: 0.73 MG/DL (ref 0.5–0.9)
EKG ATRIAL RATE: 81 BPM
EKG P AXIS: 23 DEGREES
EKG P-R INTERVAL: 148 MS
EKG Q-T INTERVAL: 372 MS
EKG QRS DURATION: 82 MS
EKG QTC CALCULATION (BAZETT): 432 MS
EKG R AXIS: 36 DEGREES
EKG T AXIS: 33 DEGREES
EKG VENTRICULAR RATE: 81 BPM
GFR AFRICAN AMERICAN: >60 ML/MIN
GFR NON-AFRICAN AMERICAN: >60 ML/MIN
GFR SERPL CREATININE-BSD FRML MDRD: ABNORMAL ML/MIN/{1.73_M2}
GFR SERPL CREATININE-BSD FRML MDRD: ABNORMAL ML/MIN/{1.73_M2}
GLUCOSE BLD-MCNC: 227 MG/DL (ref 70–99)
HCT VFR BLD CALC: 38.3 % (ref 36–46)
HEMOGLOBIN: 13 G/DL (ref 12–16)
MCH RBC QN AUTO: 29.4 PG (ref 26–34)
MCHC RBC AUTO-ENTMCNC: 34 G/DL (ref 31–37)
MCV RBC AUTO: 86.6 FL (ref 80–100)
NRBC AUTOMATED: NORMAL PER 100 WBC
PDW BLD-RTO: 13.2 % (ref 11.5–14.5)
PLATELET # BLD: 226 K/UL (ref 130–400)
PMV BLD AUTO: 10.4 FL (ref 6–12)
POTASSIUM SERPL-SCNC: 4.1 MMOL/L (ref 3.7–5.3)
RBC # BLD: 4.42 M/UL (ref 4–5.2)
SODIUM BLD-SCNC: 138 MMOL/L (ref 135–144)
WBC # BLD: 8.1 K/UL (ref 3.5–11)

## 2018-09-10 PROCEDURE — 93005 ELECTROCARDIOGRAM TRACING: CPT

## 2018-09-10 PROCEDURE — 36415 COLL VENOUS BLD VENIPUNCTURE: CPT

## 2018-09-10 PROCEDURE — 85027 COMPLETE CBC AUTOMATED: CPT

## 2018-09-10 PROCEDURE — 80048 BASIC METABOLIC PNL TOTAL CA: CPT

## 2018-09-10 ASSESSMENT — PAIN DESCRIPTION - PAIN TYPE: TYPE: ACUTE PAIN

## 2018-09-10 ASSESSMENT — PAIN DESCRIPTION - PROGRESSION: CLINICAL_PROGRESSION: GRADUALLY WORSENING

## 2018-09-10 ASSESSMENT — PAIN DESCRIPTION - LOCATION: LOCATION: OTHER (COMMENT)

## 2018-09-10 ASSESSMENT — PAIN SCALES - GENERAL: PAINLEVEL_OUTOF10: 2

## 2018-09-10 ASSESSMENT — PAIN DESCRIPTION - DESCRIPTORS: DESCRIPTORS: ACHING;OTHER (COMMENT)

## 2018-09-10 ASSESSMENT — PAIN DESCRIPTION - FREQUENCY: FREQUENCY: INTERMITTENT

## 2018-09-10 NOTE — PRE-PROCEDURE INSTRUCTIONS
surgery.  Bring a list of all medications you take, along with the dose of the medications and how often you take it. If more convenient bring the pharmacy bottles in a zip lock bag.  Brush your teeth but do not swallow water.  Bring your eyeglasses and case with you. No contacts are to be worn the day of surgery. You also may bring your hearing aids.  If you are on C-PAP or Bi-PAP at home and plan on staying in the hospital overnight for your surgery please bring the machine with you. · Do not wear any jewelry or body piercings day of surgery. Also, NO lotion, perfume or deodorant to be used the day of surgery. No nail polish on the operative extremity (arm/leg surgeries)    ·   If you are staying overnight with us, please bring a small bag of personal items.  Please wear loose, comfortable clothing. If you are potentially going to have a cast or brace bring clothing that will fit over them.  In case of illness - If you have cold or flu like symptoms (high fever, runny nose, sore throat, cough, etc.) rash, nausea, vomiting, loose stools, and/or recent contact with someone who has a contagious disease (chicken pox, measles, etc.) Please call your doctor before coming to the hospital.     If your child is having surgery please make arrangements for any other children to be cared for at home on the day of surgery. Other children are not permitted in recovery room and we want you to be able to spend time with the patient. If other arrangements are not available then we suggest that you have a second adult to stay in the waiting room. Day of Surgery/Procedure:    As a patient at [facility] you can expect quality medical and nursing care that is centered on your individual needs.   Our goal is to make your surgical experience as comfortable as

## 2018-09-10 NOTE — H&P
(PRINIVIL;ZESTRIL) 5 MG tablet TAKE ONE TABLET BY MOUTH DAILY 9/2/18   Wilson Bolton MD   Insulin Degludec (TRESIBA FLEXTOUCH) 100 UNIT/ML SOPN Inject 30 Units into the skin daily 8/10/18   Wilson Bolton MD   zolpidem (AMBIEN) 5 MG tablet Take 5 mg by mouth nightly as needed for Sleep. Farzaneh Burgess Historical Provider, MD   SITagliptin-metFORMIN HCl -1000 MG TB24 Take 1 tablet by mouth daily 7/13/18   Wilson Bolton MD   glucose blood VI test strips (TRUETEST TEST) strip 1 each by In Vitro route 3 times daily As needed. 5/10/18   Wilson Bolton MD   naproxen (NAPROSYN) 500 MG tablet TAKE ONE TABLET BY MOUTH TWICE A DAY WITH FOOD 4/16/18   Wilson Bolton MD   buPROPion (WELLBUTRIN XL) 300 MG extended release tablet Take 1 tablet by mouth daily Unsure of dose 1/9/18   Wilson Bolton MD   traMADol Russel Block) 50 MG tablet 1-2 every 6-8 hrs prn 1/5/17   Wilson Bolton MD   Cetirizine HCl (ZYRTEC PO) Take by mouth daily    Historical Provider, MD   Butalbital-APAP-Caffeine (FIORICET PO) Take by mouth as needed    Historical Provider, MD        Allergies:     Penicillins    Social History:     Tobacco:    reports that she has quit smoking. Her smoking use included Cigarettes. She has a 5.00 pack-year smoking history. She has never used smokeless tobacco.  Alcohol:      reports that she drinks alcohol. Drug Use:  reports that she does not use drugs. Functional Capacity:   1) Pt is able to walk 2 blocks or more on level ground without stopping. 2) Pt is able to climb 1 flight of stairs or more without stopping. 3) Pt is able to walk up a hill 1-2 blocks or more.     Family History:     Family History   Problem Relation Age of Onset    High Blood Pressure Mother     Breast Cancer Paternal Aunt     Heart Attack Maternal Grandfather     High Blood Pressure Maternal Grandfather     Stroke Maternal Grandfather     Diabetes Paternal Uncle     Cancer Maternal Aunt     Breast Cancer Maternal Aunt        Review of

## 2018-09-11 ENCOUNTER — TELEPHONE (OUTPATIENT)
Dept: FAMILY MEDICINE CLINIC | Age: 33
End: 2018-09-11

## 2018-09-20 ENCOUNTER — ANESTHESIA EVENT (OUTPATIENT)
Dept: OPERATING ROOM | Age: 33
DRG: 750 | End: 2018-09-20
Payer: COMMERCIAL

## 2018-09-21 ENCOUNTER — HOSPITAL ENCOUNTER (INPATIENT)
Age: 33
LOS: 1 days | Discharge: HOME | DRG: 750 | End: 2018-09-22
Attending: OBSTETRICS & GYNECOLOGY | Admitting: OBSTETRICS & GYNECOLOGY
Payer: COMMERCIAL

## 2018-09-21 ENCOUNTER — APPOINTMENT (OUTPATIENT)
Dept: GENERAL RADIOLOGY | Age: 33
DRG: 750 | End: 2018-09-21
Attending: OBSTETRICS & GYNECOLOGY
Payer: COMMERCIAL

## 2018-09-21 ENCOUNTER — ANESTHESIA (OUTPATIENT)
Dept: OPERATING ROOM | Age: 33
DRG: 750 | End: 2018-09-21
Payer: COMMERCIAL

## 2018-09-21 VITALS — DIASTOLIC BLOOD PRESSURE: 54 MMHG | SYSTOLIC BLOOD PRESSURE: 99 MMHG | OXYGEN SATURATION: 100 % | TEMPERATURE: 98.6 F

## 2018-09-21 DIAGNOSIS — G89.18 POST-OP PAIN: Primary | ICD-10-CM

## 2018-09-21 PROBLEM — Z98.890 S/P LAPAROSCOPY: Status: ACTIVE | Noted: 2018-09-21

## 2018-09-21 LAB
GLUCOSE BLD-MCNC: 159 MG/DL (ref 65–105)
GLUCOSE BLD-MCNC: 175 MG/DL (ref 65–105)
GLUCOSE BLD-MCNC: 268 MG/DL (ref 65–105)
GLUCOSE BLD-MCNC: 271 MG/DL (ref 65–105)
HCG, PREGNANCY URINE (POC): NEGATIVE

## 2018-09-21 PROCEDURE — 2709999900 HC NON-CHARGEABLE SUPPLY: Performed by: OBSTETRICS & GYNECOLOGY

## 2018-09-21 PROCEDURE — 96375 TX/PRO/DX INJ NEW DRUG ADDON: CPT

## 2018-09-21 PROCEDURE — 6370000000 HC RX 637 (ALT 250 FOR IP): Performed by: OBSTETRICS & GYNECOLOGY

## 2018-09-21 PROCEDURE — 2500000003 HC RX 250 WO HCPCS: Performed by: OBSTETRICS & GYNECOLOGY

## 2018-09-21 PROCEDURE — 6360000002 HC RX W HCPCS: Performed by: NURSE ANESTHETIST, CERTIFIED REGISTERED

## 2018-09-21 PROCEDURE — 2720000010 HC SURG SUPPLY STERILE: Performed by: OBSTETRICS & GYNECOLOGY

## 2018-09-21 PROCEDURE — 0DNU4ZZ RELEASE OMENTUM, PERCUTANEOUS ENDOSCOPIC APPROACH: ICD-10-PCS | Performed by: OBSTETRICS & GYNECOLOGY

## 2018-09-21 PROCEDURE — 3600000013 HC SURGERY LEVEL 3 ADDTL 15MIN: Performed by: OBSTETRICS & GYNECOLOGY

## 2018-09-21 PROCEDURE — 2580000003 HC RX 258: Performed by: ANESTHESIOLOGY

## 2018-09-21 PROCEDURE — 6360000002 HC RX W HCPCS: Performed by: ANESTHESIOLOGY

## 2018-09-21 PROCEDURE — 7100000001 HC PACU RECOVERY - ADDTL 15 MIN: Performed by: OBSTETRICS & GYNECOLOGY

## 2018-09-21 PROCEDURE — 3700000001 HC ADD 15 MINUTES (ANESTHESIA): Performed by: OBSTETRICS & GYNECOLOGY

## 2018-09-21 PROCEDURE — 2500000003 HC RX 250 WO HCPCS: Performed by: NURSE ANESTHETIST, CERTIFIED REGISTERED

## 2018-09-21 PROCEDURE — 3600000003 HC SURGERY LEVEL 3 BASE: Performed by: OBSTETRICS & GYNECOLOGY

## 2018-09-21 PROCEDURE — 7100000011 HC PHASE II RECOVERY - ADDTL 15 MIN: Performed by: OBSTETRICS & GYNECOLOGY

## 2018-09-21 PROCEDURE — 96374 THER/PROPH/DIAG INJ IV PUSH: CPT

## 2018-09-21 PROCEDURE — 7100000010 HC PHASE II RECOVERY - FIRST 15 MIN: Performed by: OBSTETRICS & GYNECOLOGY

## 2018-09-21 PROCEDURE — 6360000002 HC RX W HCPCS: Performed by: OBSTETRICS & GYNECOLOGY

## 2018-09-21 PROCEDURE — 82947 ASSAY GLUCOSE BLOOD QUANT: CPT

## 2018-09-21 PROCEDURE — 84703 CHORIONIC GONADOTROPIN ASSAY: CPT

## 2018-09-21 PROCEDURE — 71045 X-RAY EXAM CHEST 1 VIEW: CPT

## 2018-09-21 PROCEDURE — 1200000000 HC SEMI PRIVATE

## 2018-09-21 PROCEDURE — G0378 HOSPITAL OBSERVATION PER HR: HCPCS

## 2018-09-21 PROCEDURE — 7100000000 HC PACU RECOVERY - FIRST 15 MIN: Performed by: OBSTETRICS & GYNECOLOGY

## 2018-09-21 PROCEDURE — 3700000000 HC ANESTHESIA ATTENDED CARE: Performed by: OBSTETRICS & GYNECOLOGY

## 2018-09-21 RX ORDER — CETIRIZINE HYDROCHLORIDE 10 MG/1
10 TABLET ORAL DAILY
Status: DISCONTINUED | OUTPATIENT
Start: 2018-09-22 | End: 2018-09-22 | Stop reason: HOSPADM

## 2018-09-21 RX ORDER — SODIUM CHLORIDE 0.9 % (FLUSH) 0.9 %
10 SYRINGE (ML) INJECTION PRN
Status: DISCONTINUED | OUTPATIENT
Start: 2018-09-21 | End: 2018-09-21 | Stop reason: HOSPADM

## 2018-09-21 RX ORDER — PROMETHAZINE HYDROCHLORIDE 25 MG/ML
6.25 INJECTION, SOLUTION INTRAMUSCULAR; INTRAVENOUS
Status: DISCONTINUED | OUTPATIENT
Start: 2018-09-21 | End: 2018-09-21 | Stop reason: HOSPADM

## 2018-09-21 RX ORDER — ROCURONIUM BROMIDE 10 MG/ML
INJECTION, SOLUTION INTRAVENOUS PRN
Status: DISCONTINUED | OUTPATIENT
Start: 2018-09-21 | End: 2018-09-21 | Stop reason: SDUPTHER

## 2018-09-21 RX ORDER — ONDANSETRON 2 MG/ML
INJECTION INTRAMUSCULAR; INTRAVENOUS PRN
Status: DISCONTINUED | OUTPATIENT
Start: 2018-09-21 | End: 2018-09-21 | Stop reason: SDUPTHER

## 2018-09-21 RX ORDER — SODIUM CHLORIDE, SODIUM LACTATE, POTASSIUM CHLORIDE, CALCIUM CHLORIDE 600; 310; 30; 20 MG/100ML; MG/100ML; MG/100ML; MG/100ML
INJECTION, SOLUTION INTRAVENOUS CONTINUOUS
Status: DISCONTINUED | OUTPATIENT
Start: 2018-09-22 | End: 2018-09-21

## 2018-09-21 RX ORDER — CLINDAMYCIN PHOSPHATE 900 MG/50ML
900 INJECTION INTRAVENOUS ONCE
Status: DISCONTINUED | OUTPATIENT
Start: 2018-09-21 | End: 2018-09-21

## 2018-09-21 RX ORDER — LIDOCAINE HYDROCHLORIDE 10 MG/ML
1 INJECTION, SOLUTION EPIDURAL; INFILTRATION; INTRACAUDAL; PERINEURAL
Status: DISCONTINUED | OUTPATIENT
Start: 2018-09-21 | End: 2018-09-21 | Stop reason: HOSPADM

## 2018-09-21 RX ORDER — IBUPROFEN 800 MG/1
800 TABLET ORAL EVERY 6 HOURS PRN
Qty: 40 TABLET | Refills: 0 | Status: SHIPPED | OUTPATIENT
Start: 2018-09-21 | End: 2019-04-25 | Stop reason: ALTCHOICE

## 2018-09-21 RX ORDER — SODIUM CHLORIDE 9 MG/ML
INJECTION, SOLUTION INTRAVENOUS CONTINUOUS
Status: DISCONTINUED | OUTPATIENT
Start: 2018-09-22 | End: 2018-09-21

## 2018-09-21 RX ORDER — DEXTROSE MONOHYDRATE 50 MG/ML
100 INJECTION, SOLUTION INTRAVENOUS PRN
Status: DISCONTINUED | OUTPATIENT
Start: 2018-09-21 | End: 2018-09-22 | Stop reason: HOSPADM

## 2018-09-21 RX ORDER — LISINOPRIL 5 MG/1
5 TABLET ORAL DAILY
Status: DISCONTINUED | OUTPATIENT
Start: 2018-09-21 | End: 2018-09-22 | Stop reason: HOSPADM

## 2018-09-21 RX ORDER — KETOROLAC TROMETHAMINE 30 MG/ML
30 INJECTION, SOLUTION INTRAMUSCULAR; INTRAVENOUS EVERY 6 HOURS PRN
Status: DISCONTINUED | OUTPATIENT
Start: 2018-09-21 | End: 2018-09-22 | Stop reason: HOSPADM

## 2018-09-21 RX ORDER — FENTANYL CITRATE 50 UG/ML
INJECTION, SOLUTION INTRAMUSCULAR; INTRAVENOUS PRN
Status: DISCONTINUED | OUTPATIENT
Start: 2018-09-21 | End: 2018-09-21 | Stop reason: SDUPTHER

## 2018-09-21 RX ORDER — INSULIN GLARGINE 100 [IU]/ML
30 INJECTION, SOLUTION SUBCUTANEOUS NIGHTLY
Status: DISCONTINUED | OUTPATIENT
Start: 2018-09-22 | End: 2018-09-22 | Stop reason: HOSPADM

## 2018-09-21 RX ORDER — DEXAMETHASONE SODIUM PHOSPHATE 10 MG/ML
INJECTION INTRAMUSCULAR; INTRAVENOUS PRN
Status: DISCONTINUED | OUTPATIENT
Start: 2018-09-21 | End: 2018-09-21 | Stop reason: SDUPTHER

## 2018-09-21 RX ORDER — HYDROMORPHONE HCL 110MG/55ML
0.25 PATIENT CONTROLLED ANALGESIA SYRINGE INTRAVENOUS EVERY 5 MIN PRN
Status: DISCONTINUED | OUTPATIENT
Start: 2018-09-21 | End: 2018-09-21 | Stop reason: HOSPADM

## 2018-09-21 RX ORDER — HYDROMORPHONE HCL 110MG/55ML
0.5 PATIENT CONTROLLED ANALGESIA SYRINGE INTRAVENOUS EVERY 5 MIN PRN
Status: DISCONTINUED | OUTPATIENT
Start: 2018-09-21 | End: 2018-09-21 | Stop reason: HOSPADM

## 2018-09-21 RX ORDER — DEXTROSE MONOHYDRATE 25 G/50ML
12.5 INJECTION, SOLUTION INTRAVENOUS PRN
Status: DISCONTINUED | OUTPATIENT
Start: 2018-09-21 | End: 2018-09-22 | Stop reason: HOSPADM

## 2018-09-21 RX ORDER — IBUPROFEN 200 MG
800 TABLET ORAL EVERY 8 HOURS PRN
Status: DISCONTINUED | OUTPATIENT
Start: 2018-09-21 | End: 2018-09-22 | Stop reason: HOSPADM

## 2018-09-21 RX ORDER — ONDANSETRON 2 MG/ML
4 INJECTION INTRAMUSCULAR; INTRAVENOUS
Status: COMPLETED | OUTPATIENT
Start: 2018-09-21 | End: 2018-09-21

## 2018-09-21 RX ORDER — SODIUM CHLORIDE 0.9 % (FLUSH) 0.9 %
10 SYRINGE (ML) INJECTION EVERY 12 HOURS SCHEDULED
Status: DISCONTINUED | OUTPATIENT
Start: 2018-09-21 | End: 2018-09-21 | Stop reason: HOSPADM

## 2018-09-21 RX ORDER — FENTANYL CITRATE 50 UG/ML
25 INJECTION, SOLUTION INTRAMUSCULAR; INTRAVENOUS EVERY 5 MIN PRN
Status: DISCONTINUED | OUTPATIENT
Start: 2018-09-21 | End: 2018-09-21 | Stop reason: HOSPADM

## 2018-09-21 RX ORDER — MIDAZOLAM HYDROCHLORIDE 1 MG/ML
INJECTION INTRAMUSCULAR; INTRAVENOUS PRN
Status: DISCONTINUED | OUTPATIENT
Start: 2018-09-21 | End: 2018-09-21 | Stop reason: SDUPTHER

## 2018-09-21 RX ORDER — PROPOFOL 10 MG/ML
INJECTION, EMULSION INTRAVENOUS PRN
Status: DISCONTINUED | OUTPATIENT
Start: 2018-09-21 | End: 2018-09-21 | Stop reason: SDUPTHER

## 2018-09-21 RX ORDER — KETOROLAC TROMETHAMINE 30 MG/ML
INJECTION, SOLUTION INTRAMUSCULAR; INTRAVENOUS PRN
Status: DISCONTINUED | OUTPATIENT
Start: 2018-09-21 | End: 2018-09-21 | Stop reason: SDUPTHER

## 2018-09-21 RX ORDER — HYDROCODONE BITARTRATE AND ACETAMINOPHEN 5; 325 MG/1; MG/1
1 TABLET ORAL EVERY 6 HOURS PRN
Qty: 10 TABLET | Refills: 0 | Status: SHIPPED | OUTPATIENT
Start: 2018-09-21 | End: 2018-09-24

## 2018-09-21 RX ORDER — LIDOCAINE HYDROCHLORIDE 20 MG/ML
INJECTION, SOLUTION EPIDURAL; INFILTRATION; INTRACAUDAL; PERINEURAL PRN
Status: DISCONTINUED | OUTPATIENT
Start: 2018-09-21 | End: 2018-09-21 | Stop reason: SDUPTHER

## 2018-09-21 RX ORDER — FENTANYL CITRATE 50 UG/ML
50 INJECTION, SOLUTION INTRAMUSCULAR; INTRAVENOUS EVERY 5 MIN PRN
Status: DISCONTINUED | OUTPATIENT
Start: 2018-09-21 | End: 2018-09-21 | Stop reason: HOSPADM

## 2018-09-21 RX ORDER — BUPROPION HYDROCHLORIDE 150 MG/1
150 TABLET, EXTENDED RELEASE ORAL 2 TIMES DAILY
Status: DISCONTINUED | OUTPATIENT
Start: 2018-09-22 | End: 2018-09-22 | Stop reason: HOSPADM

## 2018-09-21 RX ORDER — CLINDAMYCIN PHOSPHATE 900 MG/50ML
900 INJECTION INTRAVENOUS
Status: DISCONTINUED | OUTPATIENT
Start: 2018-09-21 | End: 2018-09-21

## 2018-09-21 RX ORDER — NICOTINE POLACRILEX 4 MG
15 LOZENGE BUCCAL PRN
Status: DISCONTINUED | OUTPATIENT
Start: 2018-09-21 | End: 2018-09-22 | Stop reason: HOSPADM

## 2018-09-21 RX ADMIN — SODIUM CHLORIDE, PRESERVATIVE FREE 10 ML: 5 INJECTION INTRAVENOUS at 16:15

## 2018-09-21 RX ADMIN — LIDOCAINE HYDROCHLORIDE 100 MG: 20 INJECTION, SOLUTION EPIDURAL; INFILTRATION; INTRACAUDAL; PERINEURAL at 08:24

## 2018-09-21 RX ADMIN — DEXAMETHASONE SODIUM PHOSPHATE 4 MG: 10 INJECTION INTRAMUSCULAR; INTRAVENOUS at 08:24

## 2018-09-21 RX ADMIN — SUGAMMADEX 300 MG: 100 INJECTION, SOLUTION INTRAVENOUS at 09:51

## 2018-09-21 RX ADMIN — FENTANYL CITRATE 150 MCG: 50 INJECTION, SOLUTION INTRAMUSCULAR; INTRAVENOUS at 08:24

## 2018-09-21 RX ADMIN — METFORMIN HYDROCHLORIDE 500 MG: 500 TABLET ORAL at 16:13

## 2018-09-21 RX ADMIN — IBUPROFEN 800 MG: 200 TABLET, FILM COATED ORAL at 16:13

## 2018-09-21 RX ADMIN — SODIUM CHLORIDE, POTASSIUM CHLORIDE, SODIUM LACTATE AND CALCIUM CHLORIDE: 600; 310; 30; 20 INJECTION, SOLUTION INTRAVENOUS at 08:18

## 2018-09-21 RX ADMIN — ROCURONIUM BROMIDE 50 MG: 10 INJECTION, SOLUTION INTRAVENOUS at 08:24

## 2018-09-21 RX ADMIN — ONDANSETRON 4 MG: 2 INJECTION, SOLUTION INTRAMUSCULAR; INTRAVENOUS at 08:24

## 2018-09-21 RX ADMIN — INSULIN LISPRO 5 UNITS: 100 INJECTION, SOLUTION INTRAVENOUS; SUBCUTANEOUS at 23:15

## 2018-09-21 RX ADMIN — CLINDAMYCIN PHOSPHATE 900 MG: 18 INJECTION, SOLUTION INTRAMUSCULAR; INTRAVENOUS at 08:38

## 2018-09-21 RX ADMIN — PROPOFOL 200 MG: 10 INJECTION, EMULSION INTRAVENOUS at 08:24

## 2018-09-21 RX ADMIN — FENTANYL CITRATE 25 MCG: 50 INJECTION, SOLUTION INTRAMUSCULAR; INTRAVENOUS at 10:45

## 2018-09-21 RX ADMIN — FENTANYL CITRATE 50 MCG: 50 INJECTION, SOLUTION INTRAMUSCULAR; INTRAVENOUS at 09:12

## 2018-09-21 RX ADMIN — KETOROLAC TROMETHAMINE 30 MG: 30 INJECTION, SOLUTION INTRAMUSCULAR at 21:09

## 2018-09-21 RX ADMIN — SODIUM CHLORIDE, POTASSIUM CHLORIDE, SODIUM LACTATE AND CALCIUM CHLORIDE: 600; 310; 30; 20 INJECTION, SOLUTION INTRAVENOUS at 06:48

## 2018-09-21 RX ADMIN — KETOROLAC TROMETHAMINE 30 MG: 30 INJECTION, SOLUTION INTRAMUSCULAR; INTRAVENOUS at 09:56

## 2018-09-21 RX ADMIN — MIDAZOLAM 2 MG: 1 INJECTION INTRAMUSCULAR; INTRAVENOUS at 08:24

## 2018-09-21 RX ADMIN — SODIUM CHLORIDE, POTASSIUM CHLORIDE, SODIUM LACTATE AND CALCIUM CHLORIDE: 600; 310; 30; 20 INJECTION, SOLUTION INTRAVENOUS at 09:50

## 2018-09-21 RX ADMIN — ONDANSETRON 4 MG: 2 INJECTION INTRAMUSCULAR; INTRAVENOUS at 10:40

## 2018-09-21 ASSESSMENT — PULMONARY FUNCTION TESTS
PIF_VALUE: 30
PIF_VALUE: 26
PIF_VALUE: 28
PIF_VALUE: 28
PIF_VALUE: 25
PIF_VALUE: 2
PIF_VALUE: 35
PIF_VALUE: 36
PIF_VALUE: 37
PIF_VALUE: 28
PIF_VALUE: 17
PIF_VALUE: 13
PIF_VALUE: 17
PIF_VALUE: 2
PIF_VALUE: 29
PIF_VALUE: 37
PIF_VALUE: 34
PIF_VALUE: 37
PIF_VALUE: 2
PIF_VALUE: 28
PIF_VALUE: 36
PIF_VALUE: 37
PIF_VALUE: 25
PIF_VALUE: 37
PIF_VALUE: 28
PIF_VALUE: 31
PIF_VALUE: 29
PIF_VALUE: 26
PIF_VALUE: 26
PIF_VALUE: 29
PIF_VALUE: 30
PIF_VALUE: 21
PIF_VALUE: 28
PIF_VALUE: 28
PIF_VALUE: 25
PIF_VALUE: 27
PIF_VALUE: 26
PIF_VALUE: 36
PIF_VALUE: 26
PIF_VALUE: 38
PIF_VALUE: 19
PIF_VALUE: 28
PIF_VALUE: 29
PIF_VALUE: 27
PIF_VALUE: 12
PIF_VALUE: 36
PIF_VALUE: 37
PIF_VALUE: 17
PIF_VALUE: 26
PIF_VALUE: 1
PIF_VALUE: 27
PIF_VALUE: 21
PIF_VALUE: 35
PIF_VALUE: 37
PIF_VALUE: 36
PIF_VALUE: 36
PIF_VALUE: 27
PIF_VALUE: 26
PIF_VALUE: 36
PIF_VALUE: 37
PIF_VALUE: 2
PIF_VALUE: 3
PIF_VALUE: 34
PIF_VALUE: 2
PIF_VALUE: 36
PIF_VALUE: 0
PIF_VALUE: 17
PIF_VALUE: 35
PIF_VALUE: 26
PIF_VALUE: 38
PIF_VALUE: 26
PIF_VALUE: 36
PIF_VALUE: 37
PIF_VALUE: 37
PIF_VALUE: 29
PIF_VALUE: 29
PIF_VALUE: 25
PIF_VALUE: 37
PIF_VALUE: 37
PIF_VALUE: 26
PIF_VALUE: 28
PIF_VALUE: 4
PIF_VALUE: 28
PIF_VALUE: 37
PIF_VALUE: 27
PIF_VALUE: 29
PIF_VALUE: 37
PIF_VALUE: 37
PIF_VALUE: 21
PIF_VALUE: 36
PIF_VALUE: 26
PIF_VALUE: 24
PIF_VALUE: 21
PIF_VALUE: 29
PIF_VALUE: 27
PIF_VALUE: 27
PIF_VALUE: 30
PIF_VALUE: 27
PIF_VALUE: 17
PIF_VALUE: 37
PIF_VALUE: 38
PIF_VALUE: 37
PIF_VALUE: 0
PIF_VALUE: 2
PIF_VALUE: 26
PIF_VALUE: 26
PIF_VALUE: 37
PIF_VALUE: 1
PIF_VALUE: 34
PIF_VALUE: 37
PIF_VALUE: 1

## 2018-09-21 ASSESSMENT — PAIN SCALES - GENERAL
PAINLEVEL_OUTOF10: 4
PAINLEVEL_OUTOF10: 0
PAINLEVEL_OUTOF10: 6
PAINLEVEL_OUTOF10: 2
PAINLEVEL_OUTOF10: 1
PAINLEVEL_OUTOF10: 4
PAINLEVEL_OUTOF10: 6
PAINLEVEL_OUTOF10: 6

## 2018-09-21 ASSESSMENT — PAIN DESCRIPTION - PAIN TYPE
TYPE: SURGICAL PAIN

## 2018-09-21 ASSESSMENT — PAIN DESCRIPTION - LOCATION
LOCATION: ABDOMEN

## 2018-09-21 ASSESSMENT — PAIN DESCRIPTION - DESCRIPTORS
DESCRIPTORS: SHARP
DESCRIPTORS: SHARP
DESCRIPTORS: DULL;ACHING
DESCRIPTORS: SHARP
DESCRIPTORS: DISCOMFORT
DESCRIPTORS: ACHING

## 2018-09-21 ASSESSMENT — PAIN DESCRIPTION - FREQUENCY
FREQUENCY: INTERMITTENT
FREQUENCY: INTERMITTENT

## 2018-09-21 ASSESSMENT — PAIN DESCRIPTION - ONSET: ONSET: ON-GOING

## 2018-09-21 ASSESSMENT — PAIN DESCRIPTION - ORIENTATION
ORIENTATION: LOWER

## 2018-09-21 ASSESSMENT — PAIN DESCRIPTION - PROGRESSION: CLINICAL_PROGRESSION: GRADUALLY IMPROVING

## 2018-09-21 NOTE — H&P
gallop, and rub. Abdomen: Rounded abdomen. Soft, non-tender, non-distended, and active bowel sounds. Neurologic:  Bilateral hand grasps 3/5. Normal speech and cranial nerves II through XII grossly intact. Dorsal plantar flexion 5/5 bilaterally. Skin:  No gross lesions, rashes, bruising, or bleeding on exposed skin area. Extremities:  Posterior tibial pulses 2+ bilaterally. No pedal edema. No calf tenderness with palpation. Psych: Normal affect. Investigations:       Laboratory Testing:  Recent Results         Recent Results (from the past 24 hour(s))   CBC     Collection Time: 09/10/18 10:28 AM   Result Value Ref Range     WBC 8.1 3.5 - 11.0 k/uL     RBC 4.42 4.0 - 5.2 m/uL     Hemoglobin 13.0 12.0 - 16.0 g/dL     Hematocrit 38.3 36 - 46 %     MCV 86.6 80 - 100 fL     MCH 29.4 26 - 34 pg     MCHC 34.0 31 - 37 g/dL     RDW 13.2 11.5 - 14.5 %     Platelets 250 648 - 384 k/uL     MPV 10.4 6.0 - 12.0 fL     NRBC Automated NOT REPORTED per 100 WBC                Recent Labs      09/10/18   1028   HGB  13.0   HCT  38.3   WBC  8.1   MCV  86.6         Imaging/Diagnostics:     CT of the abdomen and pelvis with contrast: 2018  Impression   1. No evidence of acute abnormality within the abdomen or pelvis. 2. Diffuse hepatic fatty infiltration. 3. Degenerative changes of the osseous structures as described. EK-. See paper chart. Diagnosis:       1. Pelvic pain     Plans:      1.  Laparoscopy diagnostic possible operative         KAMLESH Campoverde CNP  9/10/2018  11:01 AM           Needed General Surgery  H&P Date of Service: 9/10/2018 10:05 AM      Expand All Collapse All    ManualTemplate   Copied  History and Physical Service   Dennis Torrez     HISTORY AND PHYSICAL EXAMINATION            Date of Evaluation:     9/10/2018  Patient name:              Robert Fink  MRN:                           [id number]  YOB: 1985  PCP: Review of Systems:      Positive and Negative as described in HPI. CONSTITUTIONAL: Negative for fevers, chills, sweats, fatigue, and weight loss. HEENT: Wears glasses. Seasonal allergies. Negative for hearing changes, runny nose, and throat pain. RESPIRATORY: Sleep apnea- Wears a CPAP nightly. Denies asthma and COPD. Negative for shortness of breath, cough, congestion, and wheezing. CARDIOVASCULAR: Negative for chest pain, blood clot, irregular heart beat, and palpitations. GASTROINTESTINAL: Lower abdominal pain. Negative for reflux, bloating, nausea, vomiting, diarrhea, constipation, and change in bowel habits   GENITOURINARY:  Negative for difficulty of urination, burning with urination, and frequency. INTEGUMENT:  Negative for rash, skin lesions, and easy bruising. HEMATOLOGIC/LYMPHATIC:  Negative for swelling/edema. ALLERGIC/IMMUNOLOGIC:  Negative for urticaria and itching. ENDOCRINE: Diabetes: Fasting blood glucose is 220. Negative for increase in drinking, increase in urination, and heat or cold intolerance. MUSCULOSKELETAL: Negative joint pains, muscle aches, and swelling of joints. NEUROLOGICAL: History of \"stress\" migraines. Negative for headaches, dizziness, lightheadedness, numbness, and tingling extremities. BEHAVIOR/PSYCH: Anxiety and depression. Physical Exam:   BP (!) 143/83   Pulse 85   Resp 18   Ht 5' 3\" (1.6 m)   Wt (!) 315 lb 11.2 oz (143.2 kg)   LMP 08/08/2018 (Exact Date)   SpO2 98%   BMI 55.92 kg/m²   Patient's last menstrual period was 08/08/2018 (exact date). No results for input(s): POCGLU in the last 72 hours. General Appearance:  Alert, well appearing, and in no acute distress. Morbidly obese. Mental status:  Oriented to person, place, and time. Head:  Normocephalic and atraumatic. Eye: Wearing glasses. No icterus, redness, pupils equal and reactive, extraocular eye movements intact, and conjunctiva clear. Ear:  Hearing grossly intact.   Nose:

## 2018-09-21 NOTE — PROGRESS NOTES
Patient admitted to room   from PACU  Oriented to room, call light and bed mechanics. Siderails up x2/4. Call light and overbed table within reach.

## 2018-09-21 NOTE — ANESTHESIA PRE PROCEDURE
Endo/Other:    (+) DiabetesType II DM, , .                 Abdominal:   (+) obese,         Vascular:                                        Anesthesia Plan      general     ASA 3       Induction: intravenous. MIPS: Postoperative opioids intended. Anesthetic plan and risks discussed with patient.                       Deana Baker MD   9/21/2018

## 2018-09-22 VITALS
TEMPERATURE: 97.9 F | RESPIRATION RATE: 18 BRPM | HEART RATE: 85 BPM | WEIGHT: 293 LBS | OXYGEN SATURATION: 98 % | HEIGHT: 63 IN | DIASTOLIC BLOOD PRESSURE: 57 MMHG | BODY MASS INDEX: 51.91 KG/M2 | SYSTOLIC BLOOD PRESSURE: 110 MMHG

## 2018-09-22 LAB — GLUCOSE BLD-MCNC: 196 MG/DL (ref 65–105)

## 2018-09-22 PROCEDURE — 6370000000 HC RX 637 (ALT 250 FOR IP): Performed by: OBSTETRICS & GYNECOLOGY

## 2018-09-22 PROCEDURE — 82947 ASSAY GLUCOSE BLOOD QUANT: CPT

## 2018-09-22 PROCEDURE — G0378 HOSPITAL OBSERVATION PER HR: HCPCS

## 2018-09-22 RX ADMIN — INSULIN GLARGINE 30 UNITS: 100 INJECTION, SOLUTION SUBCUTANEOUS at 10:33

## 2018-09-22 RX ADMIN — LISINOPRIL 5 MG: 5 TABLET ORAL at 08:28

## 2018-09-22 RX ADMIN — CETIRIZINE HYDROCHLORIDE 10 MG: 10 TABLET, FILM COATED ORAL at 08:28

## 2018-09-22 RX ADMIN — METFORMIN HYDROCHLORIDE 500 MG: 500 TABLET ORAL at 08:28

## 2018-09-22 RX ADMIN — BUPROPION HYDROCHLORIDE 150 MG: 150 TABLET, EXTENDED RELEASE ORAL at 08:28

## 2018-09-22 ASSESSMENT — PAIN SCALES - GENERAL: PAINLEVEL_OUTOF10: 0

## 2018-10-01 RX ORDER — NAPROXEN 500 MG/1
TABLET ORAL
Qty: 60 TABLET | Refills: 0 | Status: SHIPPED | OUTPATIENT
Start: 2018-10-01 | End: 2019-01-02 | Stop reason: SDUPTHER

## 2018-12-11 ENCOUNTER — HOSPITAL ENCOUNTER (OUTPATIENT)
Age: 33
Setting detail: SPECIMEN
Discharge: HOME | End: 2018-12-11
Payer: COMMERCIAL

## 2018-12-11 ENCOUNTER — OFFICE VISIT (OUTPATIENT)
Dept: FAMILY MEDICINE CLINIC | Age: 33
End: 2018-12-11
Payer: COMMERCIAL

## 2018-12-11 VITALS
BODY MASS INDEX: 51.91 KG/M2 | HEART RATE: 84 BPM | WEIGHT: 293 LBS | DIASTOLIC BLOOD PRESSURE: 72 MMHG | HEIGHT: 63 IN | SYSTOLIC BLOOD PRESSURE: 112 MMHG

## 2018-12-11 DIAGNOSIS — E11.9 CONTROLLED TYPE 2 DIABETES MELLITUS WITHOUT COMPLICATION, WITH LONG-TERM CURRENT USE OF INSULIN (HCC): Primary | ICD-10-CM

## 2018-12-11 DIAGNOSIS — E11.9 CONTROLLED TYPE 2 DIABETES MELLITUS WITHOUT COMPLICATION, WITH LONG-TERM CURRENT USE OF INSULIN (HCC): ICD-10-CM

## 2018-12-11 DIAGNOSIS — E78.2 MIXED HYPERLIPIDEMIA: ICD-10-CM

## 2018-12-11 DIAGNOSIS — E66.01 MORBID OBESITY DUE TO EXCESS CALORIES (HCC): ICD-10-CM

## 2018-12-11 DIAGNOSIS — Z79.4 CONTROLLED TYPE 2 DIABETES MELLITUS WITHOUT COMPLICATION, WITH LONG-TERM CURRENT USE OF INSULIN (HCC): ICD-10-CM

## 2018-12-11 DIAGNOSIS — F33.42 RECURRENT MAJOR DEPRESSIVE DISORDER, IN FULL REMISSION (HCC): ICD-10-CM

## 2018-12-11 DIAGNOSIS — Z79.4 CONTROLLED TYPE 2 DIABETES MELLITUS WITHOUT COMPLICATION, WITH LONG-TERM CURRENT USE OF INSULIN (HCC): Primary | ICD-10-CM

## 2018-12-11 LAB
ALBUMIN SERPL-MCNC: 4 G/DL (ref 3.5–5.2)
ALBUMIN/GLOBULIN RATIO: 1.5 (ref 1–2.5)
ALP BLD-CCNC: 63 U/L (ref 35–104)
ALT SERPL-CCNC: 32 U/L (ref 5–33)
ANION GAP SERPL CALCULATED.3IONS-SCNC: 14 MMOL/L (ref 9–17)
AST SERPL-CCNC: 16 U/L
BILIRUB SERPL-MCNC: 0.25 MG/DL (ref 0.3–1.2)
BUN BLDV-MCNC: 19 MG/DL (ref 6–20)
BUN/CREAT BLD: ABNORMAL (ref 9–20)
CALCIUM SERPL-MCNC: 9.9 MG/DL (ref 8.6–10.4)
CHLORIDE BLD-SCNC: 102 MMOL/L (ref 98–107)
CHOLESTEROL/HDL RATIO: 5.1
CHOLESTEROL: 221 MG/DL
CO2: 23 MMOL/L (ref 20–31)
CREAT SERPL-MCNC: 0.78 MG/DL (ref 0.5–0.9)
GFR AFRICAN AMERICAN: >60 ML/MIN
GFR NON-AFRICAN AMERICAN: >60 ML/MIN
GFR SERPL CREATININE-BSD FRML MDRD: ABNORMAL ML/MIN/{1.73_M2}
GFR SERPL CREATININE-BSD FRML MDRD: ABNORMAL ML/MIN/{1.73_M2}
GLUCOSE BLD-MCNC: 226 MG/DL (ref 70–99)
HBA1C MFR BLD: 8 %
HDLC SERPL-MCNC: 43 MG/DL
LDL CHOLESTEROL: 143 MG/DL (ref 0–130)
POTASSIUM SERPL-SCNC: 4.1 MMOL/L (ref 3.7–5.3)
SODIUM BLD-SCNC: 139 MMOL/L (ref 135–144)
TOTAL PROTEIN: 6.7 G/DL (ref 6.4–8.3)
TRIGL SERPL-MCNC: 177 MG/DL
VLDLC SERPL CALC-MCNC: ABNORMAL MG/DL (ref 1–30)

## 2018-12-11 PROCEDURE — 83036 HEMOGLOBIN GLYCOSYLATED A1C: CPT | Performed by: FAMILY MEDICINE

## 2018-12-11 PROCEDURE — 36415 COLL VENOUS BLD VENIPUNCTURE: CPT | Performed by: FAMILY MEDICINE

## 2018-12-11 PROCEDURE — 99214 OFFICE O/P EST MOD 30 MIN: CPT | Performed by: FAMILY MEDICINE

## 2018-12-11 ASSESSMENT — ENCOUNTER SYMPTOMS
ABDOMINAL PAIN: 0
COUGH: 0
EYES NEGATIVE: 1
SHORTNESS OF BREATH: 0

## 2019-01-03 RX ORDER — NAPROXEN 500 MG/1
TABLET ORAL
Qty: 60 TABLET | Refills: 0 | Status: SHIPPED | OUTPATIENT
Start: 2019-01-03 | End: 2019-04-25 | Stop reason: SDUPTHER

## 2019-01-14 LAB — DIABETIC RETINOPATHY: NEGATIVE

## 2019-03-13 DIAGNOSIS — F33.42 RECURRENT MAJOR DEPRESSIVE DISORDER, IN FULL REMISSION (HCC): ICD-10-CM

## 2019-03-13 RX ORDER — BUPROPION HYDROCHLORIDE 300 MG/1
TABLET ORAL
Qty: 30 TABLET | Refills: 3 | Status: SHIPPED | OUTPATIENT
Start: 2019-03-13 | End: 2019-10-22 | Stop reason: SDUPTHER

## 2019-03-31 RX ORDER — SITAGLIPTIN AND METFORMIN HYDROCHLORIDE 100; 1000 MG/1; MG/1
TABLET, FILM COATED, EXTENDED RELEASE ORAL
Qty: 30 TABLET | Refills: 4 | Status: SHIPPED | OUTPATIENT
Start: 2019-03-31 | End: 2019-10-22 | Stop reason: SDUPTHER

## 2019-04-25 ENCOUNTER — OFFICE VISIT (OUTPATIENT)
Dept: FAMILY MEDICINE CLINIC | Age: 34
End: 2019-04-25
Payer: COMMERCIAL

## 2019-04-25 VITALS
HEART RATE: 80 BPM | WEIGHT: 293 LBS | SYSTOLIC BLOOD PRESSURE: 128 MMHG | DIASTOLIC BLOOD PRESSURE: 68 MMHG | BODY MASS INDEX: 56.69 KG/M2

## 2019-04-25 DIAGNOSIS — F34.1 DYSTHYMIA: ICD-10-CM

## 2019-04-25 DIAGNOSIS — M79.642 BILATERAL HAND PAIN: ICD-10-CM

## 2019-04-25 DIAGNOSIS — E11.9 CONTROLLED TYPE 2 DIABETES MELLITUS WITHOUT COMPLICATION, WITHOUT LONG-TERM CURRENT USE OF INSULIN (HCC): Primary | ICD-10-CM

## 2019-04-25 DIAGNOSIS — I10 ESSENTIAL HYPERTENSION: ICD-10-CM

## 2019-04-25 DIAGNOSIS — E66.01 MORBID OBESITY DUE TO EXCESS CALORIES (HCC): ICD-10-CM

## 2019-04-25 DIAGNOSIS — E78.2 MIXED HYPERLIPIDEMIA: ICD-10-CM

## 2019-04-25 DIAGNOSIS — M79.641 BILATERAL HAND PAIN: ICD-10-CM

## 2019-04-25 LAB — HBA1C MFR BLD: 8.6 %

## 2019-04-25 PROCEDURE — 99214 OFFICE O/P EST MOD 30 MIN: CPT | Performed by: FAMILY MEDICINE

## 2019-04-25 PROCEDURE — 83036 HEMOGLOBIN GLYCOSYLATED A1C: CPT | Performed by: FAMILY MEDICINE

## 2019-04-25 RX ORDER — ATORVASTATIN CALCIUM 20 MG/1
20 TABLET, FILM COATED ORAL DAILY
Qty: 30 TABLET | Refills: 3 | Status: SHIPPED | OUTPATIENT
Start: 2019-04-25 | End: 2019-09-30 | Stop reason: SDUPTHER

## 2019-04-25 RX ORDER — INSULIN DEGLUDEC INJECTION 100 U/ML
INJECTION, SOLUTION SUBCUTANEOUS
COMMUNITY
Start: 2019-03-05 | End: 2019-04-25 | Stop reason: SDUPTHER

## 2019-04-25 RX ORDER — NAPROXEN 500 MG/1
TABLET ORAL
Qty: 60 TABLET | Refills: 3 | Status: SHIPPED | OUTPATIENT
Start: 2019-04-25 | End: 2020-02-06

## 2019-04-25 RX ORDER — INSULIN DEGLUDEC INJECTION 100 U/ML
60 INJECTION, SOLUTION SUBCUTANEOUS DAILY
Qty: 5 PEN | Refills: 5 | Status: SHIPPED | OUTPATIENT
Start: 2019-04-25 | End: 2019-11-20 | Stop reason: SDUPTHER

## 2019-04-25 ASSESSMENT — ENCOUNTER SYMPTOMS
BACK PAIN: 1
CONSTIPATION: 0
EYES NEGATIVE: 1
ABDOMINAL PAIN: 0
DIARRHEA: 0
SHORTNESS OF BREATH: 0
COUGH: 0

## 2019-04-25 NOTE — PROGRESS NOTES
Diabetic visit information    BP Readings from Last 3 Encounters:   12/11/18 112/72   09/22/18 (!) 110/57   09/21/18 (!) 99/54       Hemoglobin A1C (%)   Date Value   12/11/2018 8.0   08/10/2018 8.6   05/10/2018 9.4     LDL Cholesterol (mg/dL)   Date Value   12/11/2018 143 (H)     LDL Calculated (mg/dL)   Date Value   01/09/2018 138 (A)               Have you changed or started any medications since your last visit including any over-the-counter medicines, vitamins, or herbal medicines? no   Have you stopped taking any of your medications? Is so, why? -  yes -   Are you having any side effects from any of your medications? - yes - dry cough    Have you seen any other physician or provider since your last visit?  no   Have you had any other diagnostic tests since your last visit?  no   Have you been seen in the emergency room and/or had an admission in a hospital since we last saw you?  no     Have you had your annual diabetic retinal (eye) exam? Yes   (ensure copy of exam is in the chart)    Have you had your routine dental cleaning in the past 6 months? no    Do you have an active [employer] account? If not, what are your barriers? Yes    Patient Care Team:  Shira Chen MD as PCP - General    Medical history Review  Past Medical, Family, and Social History reviewed and does contribute to the patient presenting condition.     Health Maintenance   Topic Date Due    Diabetic foot exam  06/06/1997    Varicella Vaccine (1 of 2 - 13+ 2-dose series) 06/06/2000    Hepatitis B Vaccine (1 of 3 - Risk 3-dose series) 06/06/2006    Pneumococcal 0-64 years Vaccine (1 of 1 - PPSV23) 11/03/2017    Diabetic microalbuminuria test  07/13/2019    Flu vaccine (Season Ended) 09/01/2019    A1C test (Diabetic or Prediabetic)  12/11/2019    Lipid screen  12/11/2019    Potassium monitoring  12/11/2019    Creatinine monitoring  12/11/2019    Diabetic retinal exam  01/14/2021    Cervical cancer screen  07/12/2021    DTaP/Tdap/Td vaccine (2 - Td) 10/31/2024    HIV screen  Completed

## 2019-04-25 NOTE — PROGRESS NOTES
[facility] PHYSIC[facility]ANS  [facility]  [address]  [address] [address]  Dept: [phone]    4/25/2019    CHIEF COMPLAINT    Chief Complaint   Patient presents with    Diabetes    Hyperlipidemia    Hypertension       HPI    Bushra Shaw is a 34 y.o. female who presents   Chief Complaint   Patient presents with    Diabetes    Hyperlipidemia    Hypertension   . Not testing bs regularly. Using tresiba, has had some low bs symptoms, resolved with eating    Diabetes   She presents for her follow-up diabetic visit. She has type 2 diabetes mellitus. Her disease course has been worsening. Hypoglycemia symptoms include hunger, nervousness/anxiousness (stable) and sweats. Pertinent negatives for hypoglycemia include no dizziness or headaches. Pertinent negatives for diabetes include no chest pain, no fatigue, no foot paresthesias and no weight loss. There are no hypoglycemic complications. There are no diabetic complications. Risk factors for coronary artery disease include hypertension, diabetes mellitus, dyslipidemia, obesity and sedentary lifestyle. Current diabetic treatment includes insulin injections and oral agent (dual therapy). She is compliant with treatment some of the time. Her weight is stable. She is following a generally healthy diet. She has not had a previous visit with a dietitian. She rarely participates in exercise. Her home blood glucose trend is fluctuating minimally. An ACE inhibitor/angiotensin II receptor blocker is being taken. She does not see a podiatrist.Eye exam is current. Hypertension   This is a chronic problem. The current episode started more than 1 year ago. The problem is controlled. Associated symptoms include sweats. Pertinent negatives include no chest pain, headaches or shortness of breath. Risk factors for coronary artery disease include obesity, dyslipidemia and diabetes mellitus.  Past treatments include ACE inhibitors. The current treatment provides moderate improvement. Hyperlipidemia   Pertinent negatives include no chest pain or shortness of breath. Vitals:    04/25/19 0909   BP: 128/68   Pulse: 80   Weight: (!) 320 lb (145.2 kg)       REVIEW OF SYSTEMS    Review of Systems   Constitutional: Negative for fatigue, fever and weight loss. HENT: Negative. Eyes: Negative. Respiratory: Negative for cough and shortness of breath. Cardiovascular: Negative for chest pain and leg swelling. Gastrointestinal: Negative for abdominal pain (chronic hand pain), constipation and diarrhea. Genitourinary: Negative for frequency and urgency. Musculoskeletal: Positive for arthralgias (both hands) and back pain (chronic). Skin: Negative. Neurological: Negative for dizziness and headaches. Psychiatric/Behavioral: Positive for dysphoric mood (stable on med). The patient is nervous/anxious (stable).         PAST MEDICAL HISTORY    Past Medical History:   Diagnosis Date    Anxiety and depression     Depression     Diabetes mellitus ([facility] [address]     Migraines     Obesity     PONV (postoperative nausea and vomiting)     Seasonal allergies     Sleep apnea     uses cpap nightly    Type 2 diabetes mellitus without complication (HCC)        FAMILY HISTORY    Family History   Problem Relation Age of Onset    High Blood Pressure Mother     Breast Cancer Paternal Aunt     Heart Attack Maternal Grandfather     High Blood Pressure Maternal Grandfather     Stroke Maternal Grandfather     Diabetes Paternal Uncle     Cancer Maternal Aunt     Breast Cancer Maternal Aunt        SOCIAL HISTORY    Social History     Socioeconomic History    Marital status:      Spouse name: None    Number of children: None    Years of education: None    Highest education level: None   Occupational History    None   Social Needs    Financial resource strain: None    Food insecurity:     Worry: None     Inability: None    Transportation needs:     Medical: None     Non-medical: None   Tobacco Use    Smoking status: Former Smoker     Packs/day: 0.50     Years: 10.00     Pack years: 5.00     Types: Cigarettes    Smokeless tobacco: Never Used    Tobacco comment: quit smoking 4 yrs ago   Substance and Sexual Activity    Alcohol use: Yes     Comment: social    Drug use: No    Sexual activity: Yes     Partners: Male   Lifestyle    Physical activity:     Days per week: None     Minutes per session: None    Stress: None   Relationships    Social connections:     Talks on phone: None     Gets together: None     Attends Taoist service: None     Active member of club or organization: None     Attends meetings of clubs or organizations: None     Relationship status: None    Intimate partner violence:     Fear of current or ex partner: None     Emotionally abused: None     Physically abused: None     Forced sexual activity: None   Other Topics Concern    None   Social History Narrative    None       SURGICAL HISTORY    Past Surgical History:   Procedure Laterality Date     SECTION      LAPAROSCOPY  2018    LAPAROSCOPY DIAGNOSTIC (N/A Abdomen), Lysis of adhesions    [place] LAP,DIAGNOSTIC ABDOMEN N/A 2018    LAPAROSCOPY DIAGNOSTIC performed by Jessie Jean MD at [address] [facility] age 7yrs       CURRENT MEDICATIONS    Current Outpatient Medications   Medication Sig Dispense Refill    TRESIBA FLEXTOUCH 100 UNIT/ML SOPN Inject 60 Units into the skin daily 5 pen 5    naproxen (NAPROSYN) 500 MG tablet TAKE ONE TABLET BY MOUTH TWICE A DAY WITH FOOD 60 tablet 3    atorvastatin (LIPITOR) 20 MG tablet Take 1 tablet by mouth daily 30 tablet 3    JANUMET -1000 MG TB24 TAKE ONE TABLET BY MOUTH DAILY 30 tablet 4    buPROPion (WELLBUTRIN XL) 300 MG extended release tablet TAKE ONE TABLET BY MOUTH DAILY 30 tablet 3    lisinopril (PRINIVIL;ZESTRIL) 5 MG tablet TAKE ONE TABLET BY MOUTH DAILY 30 tablet 4    glucose blood VI test strips (TRUETEST TEST) strip 1 each by In Vitro route 3 times daily As needed. 100 each 1    Cetirizine HCl (ZYRTEC PO) Take by mouth daily      Butalbital-APAP-Caffeine (FIORICET PO) Take by mouth as needed      zolpidem (AMBIEN) 5 MG tablet Take 5 mg by mouth nightly as needed for Sleep. .       No current facility-administered medications for this visit. ALLERGIES    Allergies   Allergen Reactions    Penicillins Anaphylaxis       PHYSICAL EXAM   Physical Exam   Constitutional: She is oriented to person, place, and time. She appears well-developed and well-nourished. obese   HENT:   Head: Normocephalic. Eyes: Pupils are equal, round, and reactive to light. Neck: Normal range of motion. Neck supple. Cardiovascular: Normal rate, regular rhythm and normal heart sounds. No murmur heard. Pulmonary/Chest: Effort normal and breath sounds normal. She has no wheezes. She has no rales. Abdominal: Soft. There is no tenderness. There is no rebound and no guarding. Musculoskeletal: Normal range of motion. She exhibits edema (trace bilat) and tenderness (lumbar spine). She exhibits no deformity. Lymphadenopathy:     She has no cervical adenopathy. Neurological: She is alert and oriented to person, place, and time. Skin: Skin is warm and dry. Psychiatric: She has a normal mood and affect. Her behavior is normal.   Vitals reviewed. Assessment/PLan  1. Controlled type 2 diabetes mellitus without complication, without long-term current use of insulin ([address]  Not well controlled. Agreed to return to diabetic educator. Increase tresiba to 60 units.   - POCT glycosylated hemoglobin (Hb A1C)-8.6  - TRESIBA FLEXTOUCH 100 UNIT/ML SOPN; Inject 60 Units into the skin daily  Dispense: 5 pen; Refill: 5    2. Mixed hyperlipidemia  Chronic, stable, continue current medication and/or plan    - atorvastatin (LIPITOR) 20 MG tablet;  Take 1 tablet by mouth

## 2019-07-25 ENCOUNTER — OFFICE VISIT (OUTPATIENT)
Dept: FAMILY MEDICINE CLINIC | Age: 34
End: 2019-07-25
Payer: COMMERCIAL

## 2019-07-25 ENCOUNTER — HOSPITAL ENCOUNTER (OUTPATIENT)
Age: 34
Setting detail: SPECIMEN
Discharge: HOME | End: 2019-07-25
Payer: COMMERCIAL

## 2019-07-25 VITALS
BODY MASS INDEX: 51.91 KG/M2 | HEIGHT: 63 IN | WEIGHT: 293 LBS | DIASTOLIC BLOOD PRESSURE: 80 MMHG | HEART RATE: 76 BPM | SYSTOLIC BLOOD PRESSURE: 110 MMHG

## 2019-07-25 DIAGNOSIS — Z11.59 NEED FOR HEPATITIS B SCREENING TEST: ICD-10-CM

## 2019-07-25 DIAGNOSIS — I10 ESSENTIAL HYPERTENSION: ICD-10-CM

## 2019-07-25 DIAGNOSIS — E78.2 MIXED HYPERLIPIDEMIA: ICD-10-CM

## 2019-07-25 DIAGNOSIS — E66.01 MORBID OBESITY DUE TO EXCESS CALORIES (HCC): ICD-10-CM

## 2019-07-25 DIAGNOSIS — Z79.4 CONTROLLED TYPE 2 DIABETES MELLITUS WITHOUT COMPLICATION, WITH LONG-TERM CURRENT USE OF INSULIN (HCC): Primary | ICD-10-CM

## 2019-07-25 DIAGNOSIS — E11.9 CONTROLLED TYPE 2 DIABETES MELLITUS WITHOUT COMPLICATION, WITH LONG-TERM CURRENT USE OF INSULIN (HCC): Primary | ICD-10-CM

## 2019-07-25 LAB
ALBUMIN SERPL-MCNC: 4.2 G/DL (ref 3.5–5.2)
ALBUMIN/GLOBULIN RATIO: 1.6 (ref 1–2.5)
ALP BLD-CCNC: 57 U/L (ref 35–104)
ALT SERPL-CCNC: 28 U/L (ref 5–33)
ANION GAP SERPL CALCULATED.3IONS-SCNC: 14 MMOL/L (ref 9–17)
AST SERPL-CCNC: 15 U/L
BILIRUB SERPL-MCNC: 0.36 MG/DL (ref 0.3–1.2)
BUN BLDV-MCNC: 21 MG/DL (ref 6–20)
BUN/CREAT BLD: ABNORMAL (ref 9–20)
CALCIUM SERPL-MCNC: 9.2 MG/DL (ref 8.6–10.4)
CHLORIDE BLD-SCNC: 104 MMOL/L (ref 98–107)
CHOLESTEROL/HDL RATIO: 2.8
CHOLESTEROL: 126 MG/DL
CO2: 23 MMOL/L (ref 20–31)
CREAT SERPL-MCNC: 0.79 MG/DL (ref 0.5–0.9)
CREATININE URINE POCT: 200
GFR AFRICAN AMERICAN: >60 ML/MIN
GFR NON-AFRICAN AMERICAN: >60 ML/MIN
GFR SERPL CREATININE-BSD FRML MDRD: ABNORMAL ML/MIN/{1.73_M2}
GFR SERPL CREATININE-BSD FRML MDRD: ABNORMAL ML/MIN/{1.73_M2}
GLUCOSE BLD-MCNC: 161 MG/DL (ref 70–99)
HBA1C MFR BLD: 7.8 %
HBV SURFACE AB TITR SER: <3.5 MIU/ML
HDLC SERPL-MCNC: 45 MG/DL
LDL CHOLESTEROL: 61 MG/DL (ref 0–130)
MICROALBUMIN/CREAT 24H UR: 80 MG/G{CREAT}
MICROALBUMIN/CREAT UR-RTO: <30
POTASSIUM SERPL-SCNC: 4.6 MMOL/L (ref 3.7–5.3)
SODIUM BLD-SCNC: 141 MMOL/L (ref 135–144)
TOTAL PROTEIN: 6.9 G/DL (ref 6.4–8.3)
TRIGL SERPL-MCNC: 102 MG/DL
VLDLC SERPL CALC-MCNC: NORMAL MG/DL (ref 1–30)

## 2019-07-25 PROCEDURE — 36415 COLL VENOUS BLD VENIPUNCTURE: CPT | Performed by: FAMILY MEDICINE

## 2019-07-25 PROCEDURE — 83036 HEMOGLOBIN GLYCOSYLATED A1C: CPT | Performed by: FAMILY MEDICINE

## 2019-07-25 PROCEDURE — 99214 OFFICE O/P EST MOD 30 MIN: CPT | Performed by: FAMILY MEDICINE

## 2019-07-25 PROCEDURE — 82044 UR ALBUMIN SEMIQUANTITATIVE: CPT | Performed by: FAMILY MEDICINE

## 2019-07-25 ASSESSMENT — ENCOUNTER SYMPTOMS
CONSTIPATION: 0
EYES NEGATIVE: 1
SHORTNESS OF BREATH: 0
COUGH: 0
BLOOD IN STOOL: 0
ABDOMINAL PAIN: 0

## 2019-07-25 NOTE — PROGRESS NOTES
[facility] [place] PHYSICIANS  [facility]  [address]  [facility] [address]  Dept: [phone]    7/25/2019    CHIEF COMPLAINT    Chief Complaint   Patient presents with    Diabetes       HPI    Shaunna Wetzel is a 30 y.o. female who presents   Chief Complaint   Patient presents with    Diabetes   . Diabetes   She presents for her follow-up diabetic visit. She has type 2 diabetes mellitus. Her disease course has been improving. There are no hypoglycemic associated symptoms. Pertinent negatives for hypoglycemia include no dizziness, headaches or nervousness/anxiousness. Pertinent negatives for diabetes include no chest pain, no fatigue and no foot ulcerations. There are no hypoglycemic complications. Symptoms are improving. Risk factors for coronary artery disease include diabetes mellitus, obesity and sedentary lifestyle. Current diabetic treatment includes insulin injections, oral agent (monotherapy) and diet. She is compliant with treatment most of the time. Her weight is decreasing steadily. She is following a generally healthy diet. She has not had a previous visit with a dietitian. She rarely participates in exercise. An ACE inhibitor/angiotensin II receptor blocker is being taken. Vitals:    07/25/19 0913   BP: 110/80   Pulse: 76   Weight: (!) 311 lb (141.1 kg)   Height: 5' 3\" (1.6 m)       REVIEW OF SYSTEMS    Review of Systems   Constitutional: Negative for fatigue, fever and unexpected weight change. HENT: Negative. Eyes: Negative. Respiratory: Negative for cough and shortness of breath. Cardiovascular: Negative for chest pain and leg swelling. Gastrointestinal: Negative for abdominal pain, blood in stool and constipation. Genitourinary: Negative for frequency, menstrual problem and urgency. Musculoskeletal: Negative. Skin: Negative. Neurological: Negative for dizziness and headaches.    Psychiatric/Behavioral: Positive for medications, diet and exercise. Discussed use, benefit, and side effects of prescribed medications. Barriers to medication compliance addressed. Patient given educational materials - see patient instructions. All patient questions answered. Patient voiced understanding. Return in about 4 months (around 11/25/2019) for diabetes, htn.         Electronically signed by Natali Herron MD on 7/25/19 at 9:18 AM

## 2019-08-15 ENCOUNTER — TELEPHONE (OUTPATIENT)
Dept: FAMILY MEDICINE CLINIC | Age: 34
End: 2019-08-15

## 2019-08-15 DIAGNOSIS — F41.9 ANXIETY: Primary | ICD-10-CM

## 2019-08-15 RX ORDER — LORAZEPAM 0.5 MG/1
0.5 TABLET ORAL DAILY PRN
Qty: 15 TABLET | Refills: 0 | Status: SHIPPED | OUTPATIENT
Start: 2019-08-15 | End: 2019-12-05 | Stop reason: SDUPTHER

## 2019-09-30 DIAGNOSIS — E78.2 MIXED HYPERLIPIDEMIA: ICD-10-CM

## 2019-09-30 RX ORDER — PEN NEEDLE, DIABETIC 31 G X1/4"
NEEDLE, DISPOSABLE MISCELLANEOUS
Qty: 100 EACH | Refills: 0 | Status: SHIPPED | OUTPATIENT
Start: 2019-09-30 | End: 2020-01-21

## 2019-09-30 RX ORDER — ATORVASTATIN CALCIUM 20 MG/1
TABLET, FILM COATED ORAL
Qty: 30 TABLET | Refills: 2 | Status: SHIPPED | OUTPATIENT
Start: 2019-09-30 | End: 2020-01-17

## 2019-11-20 DIAGNOSIS — E11.9 CONTROLLED TYPE 2 DIABETES MELLITUS WITHOUT COMPLICATION, WITHOUT LONG-TERM CURRENT USE OF INSULIN (HCC): ICD-10-CM

## 2019-11-21 RX ORDER — INSULIN DEGLUDEC INJECTION 100 U/ML
INJECTION, SOLUTION SUBCUTANEOUS
Qty: 15 PEN | Refills: 3 | Status: SHIPPED | OUTPATIENT
Start: 2019-11-21 | End: 2020-03-16

## 2019-12-05 ENCOUNTER — OFFICE VISIT (OUTPATIENT)
Dept: FAMILY MEDICINE CLINIC | Age: 34
End: 2019-12-05
Payer: COMMERCIAL

## 2019-12-05 VITALS
DIASTOLIC BLOOD PRESSURE: 78 MMHG | SYSTOLIC BLOOD PRESSURE: 128 MMHG | OXYGEN SATURATION: 98 % | WEIGHT: 293 LBS | HEIGHT: 63 IN | HEART RATE: 105 BPM | BODY MASS INDEX: 51.91 KG/M2

## 2019-12-05 DIAGNOSIS — F33.42 RECURRENT MAJOR DEPRESSIVE DISORDER, IN FULL REMISSION (HCC): ICD-10-CM

## 2019-12-05 DIAGNOSIS — F41.9 ANXIETY: ICD-10-CM

## 2019-12-05 DIAGNOSIS — I10 ESSENTIAL HYPERTENSION: ICD-10-CM

## 2019-12-05 DIAGNOSIS — E11.9 CONTROLLED TYPE 2 DIABETES MELLITUS WITHOUT COMPLICATION, WITHOUT LONG-TERM CURRENT USE OF INSULIN (HCC): Primary | ICD-10-CM

## 2019-12-05 DIAGNOSIS — E66.01 MORBID OBESITY DUE TO EXCESS CALORIES (HCC): ICD-10-CM

## 2019-12-05 DIAGNOSIS — E78.2 MIXED HYPERLIPIDEMIA: ICD-10-CM

## 2019-12-05 LAB — HBA1C MFR BLD: 7.2 %

## 2019-12-05 PROCEDURE — 99214 OFFICE O/P EST MOD 30 MIN: CPT | Performed by: FAMILY MEDICINE

## 2019-12-05 PROCEDURE — 83036 HEMOGLOBIN GLYCOSYLATED A1C: CPT | Performed by: FAMILY MEDICINE

## 2019-12-05 RX ORDER — LORAZEPAM 0.5 MG/1
0.5 TABLET ORAL DAILY PRN
Qty: 15 TABLET | Refills: 0 | Status: SHIPPED | OUTPATIENT
Start: 2019-12-05 | End: 2019-12-20

## 2019-12-05 ASSESSMENT — ENCOUNTER SYMPTOMS
SHORTNESS OF BREATH: 0
RESPIRATORY NEGATIVE: 1
BACK PAIN: 1

## 2019-12-22 DIAGNOSIS — E11.9 CONTROLLED TYPE 2 DIABETES MELLITUS WITHOUT COMPLICATION, WITHOUT LONG-TERM CURRENT USE OF INSULIN (HCC): ICD-10-CM

## 2019-12-23 RX ORDER — LISINOPRIL 5 MG/1
TABLET ORAL
Qty: 30 TABLET | Refills: 4 | Status: SHIPPED | OUTPATIENT
Start: 2019-12-23 | End: 2020-06-09

## 2020-03-12 ENCOUNTER — TELEPHONE (OUTPATIENT)
Dept: FAMILY MEDICINE CLINIC | Age: 35
End: 2020-03-12

## 2020-03-12 NOTE — TELEPHONE ENCOUNTER
Please put patient down for the 3:30 with Dr. Alex Richard as patient called this evening reporting sore throat, cough and known exposure to strep from her 6year old daughter.

## 2020-03-13 ENCOUNTER — HOSPITAL ENCOUNTER (OUTPATIENT)
Age: 35
Setting detail: SPECIMEN
Discharge: HOME | End: 2020-03-13
Payer: COMMERCIAL

## 2020-03-13 ENCOUNTER — OFFICE VISIT (OUTPATIENT)
Dept: FAMILY MEDICINE CLINIC | Age: 35
End: 2020-03-13
Payer: COMMERCIAL

## 2020-03-13 VITALS
TEMPERATURE: 98.5 F | BODY MASS INDEX: 51.91 KG/M2 | HEIGHT: 63 IN | SYSTOLIC BLOOD PRESSURE: 100 MMHG | WEIGHT: 293 LBS | DIASTOLIC BLOOD PRESSURE: 80 MMHG | HEART RATE: 60 BPM

## 2020-03-13 LAB — S PYO AG THROAT QL: NORMAL

## 2020-03-13 PROCEDURE — 99213 OFFICE O/P EST LOW 20 MIN: CPT | Performed by: FAMILY MEDICINE

## 2020-03-13 PROCEDURE — 87880 STREP A ASSAY W/OPTIC: CPT | Performed by: FAMILY MEDICINE

## 2020-03-13 RX ORDER — AZITHROMYCIN 250 MG/1
TABLET, FILM COATED ORAL
Qty: 1 PACKET | Refills: 0 | Status: SHIPPED | OUTPATIENT
Start: 2020-03-13 | End: 2020-08-25 | Stop reason: ALTCHOICE

## 2020-03-13 ASSESSMENT — ENCOUNTER SYMPTOMS
VOMITING: 0
COUGH: 1
NAUSEA: 0
SHORTNESS OF BREATH: 0
SORE THROAT: 1

## 2020-03-13 NOTE — PROGRESS NOTES
diabetes mellitus without complication ([facility] [address]        FAMILY HISTORY    Family History   Problem Relation Age of Onset    High Blood Pressure Mother     Breast Cancer Paternal Aunt     Heart Attack Maternal Grandfather     High Blood Pressure Maternal Grandfather     Stroke Maternal Grandfather     Diabetes Paternal Uncle     Cancer Maternal Aunt     Breast Cancer Maternal Aunt        SOCIAL HISTORY    Social History     Socioeconomic History    Marital status:      Spouse name: None    Number of children: None    Years of education: None    Highest education level: None   Occupational History    None   Social Needs    Financial resource strain: None    Food insecurity     Worry: None     Inability: None    Transportation needs     Medical: None     Non-medical: None   Tobacco Use    Smoking status: Former Smoker     Packs/day: 0.50     Years: 10.00     Pack years: 5.00     Types: Cigarettes    Smokeless tobacco: Never Used    Tobacco comment: quit smoking 4 yrs ago   Substance and Sexual Activity    Alcohol use: Yes     Comment: social    Drug use: No    Sexual activity: Yes     Partners: Male   Lifestyle    Physical activity     Days per week: None     Minutes per session: None    Stress: None   Relationships    Social connections     Talks on phone: None     Gets together: None     Attends [employer] service: None     Active member of club or organization: None     Attends meetings of clubs or organizations: None     Relationship status: None    Intimate partner violence     Fear of current or ex partner: None     Emotionally abused: None     Physically abused: None     Forced sexual activity: None   Other Topics Concern    None   Social History Narrative    None       SURGICAL HISTORY    Past Surgical History:   Procedure Laterality Date     SECTION      LAPAROSCOPY  2018    LAPAROSCOPY DIAGNOSTIC (N/A Abdomen), Lysis of adhesions    [place] LAP,DIAGNOSTIC ABDOMEN N/A

## 2020-03-15 LAB
CULTURE: NORMAL
CULTURE: NORMAL
Lab: NORMAL
SPECIMEN DESCRIPTION: NORMAL

## 2020-04-13 ENCOUNTER — TELEMEDICINE (OUTPATIENT)
Dept: FAMILY MEDICINE CLINIC | Age: 35
End: 2020-04-13
Payer: COMMERCIAL

## 2020-04-13 PROCEDURE — 99213 OFFICE O/P EST LOW 20 MIN: CPT | Performed by: FAMILY MEDICINE

## 2020-04-13 RX ORDER — INSULIN DEGLUDEC INJECTION 100 U/ML
INJECTION, SOLUTION SUBCUTANEOUS
Qty: 15 PEN | Refills: 3 | Status: SHIPPED | OUTPATIENT
Start: 2020-04-13 | End: 2020-10-19

## 2020-04-13 ASSESSMENT — ENCOUNTER SYMPTOMS
BLOOD IN STOOL: 0
ABDOMINAL PAIN: 0
CONSTIPATION: 0
SHORTNESS OF BREATH: 0
COUGH: 0
DIARRHEA: 0
EYES NEGATIVE: 1
ALLERGIC/IMMUNOLOGIC NEGATIVE: 1

## 2020-07-27 RX ORDER — ATORVASTATIN CALCIUM 20 MG/1
TABLET, FILM COATED ORAL
Qty: 30 TABLET | Refills: 4 | Status: SHIPPED | OUTPATIENT
Start: 2020-07-27 | End: 2020-11-09 | Stop reason: SDUPTHER

## 2020-08-25 ENCOUNTER — HOSPITAL ENCOUNTER (OUTPATIENT)
Age: 35
Setting detail: SPECIMEN
Discharge: HOME | End: 2020-08-25
Payer: COMMERCIAL

## 2020-08-25 ENCOUNTER — OFFICE VISIT (OUTPATIENT)
Dept: FAMILY MEDICINE CLINIC | Age: 35
End: 2020-08-25
Payer: COMMERCIAL

## 2020-08-25 VITALS
BODY MASS INDEX: 50.13 KG/M2 | WEIGHT: 283 LBS | SYSTOLIC BLOOD PRESSURE: 110 MMHG | DIASTOLIC BLOOD PRESSURE: 76 MMHG | TEMPERATURE: 97.2 F

## 2020-08-25 LAB
ALBUMIN SERPL-MCNC: 4.2 G/DL (ref 3.5–5.2)
ALBUMIN/GLOBULIN RATIO: 1.9 (ref 1–2.5)
ALP BLD-CCNC: 64 U/L (ref 35–104)
ALT SERPL-CCNC: 34 U/L (ref 5–33)
ANION GAP SERPL CALCULATED.3IONS-SCNC: 13 MMOL/L (ref 9–17)
AST SERPL-CCNC: 20 U/L
BILIRUB SERPL-MCNC: 0.49 MG/DL (ref 0.3–1.2)
BUN BLDV-MCNC: 17 MG/DL (ref 6–20)
BUN/CREAT BLD: ABNORMAL (ref 9–20)
CALCIUM SERPL-MCNC: 9.3 MG/DL (ref 8.6–10.4)
CHLORIDE BLD-SCNC: 100 MMOL/L (ref 98–107)
CHOLESTEROL/HDL RATIO: 3
CHOLESTEROL: 130 MG/DL
CO2: 24 MMOL/L (ref 20–31)
CREAT SERPL-MCNC: 0.76 MG/DL (ref 0.5–0.9)
ESTIMATED AVERAGE GLUCOSE: 214 MG/DL
GFR AFRICAN AMERICAN: >60 ML/MIN
GFR NON-AFRICAN AMERICAN: >60 ML/MIN
GFR SERPL CREATININE-BSD FRML MDRD: ABNORMAL ML/MIN/{1.73_M2}
GFR SERPL CREATININE-BSD FRML MDRD: ABNORMAL ML/MIN/{1.73_M2}
GLUCOSE BLD-MCNC: 203 MG/DL (ref 70–99)
HBA1C MFR BLD: 9.1 % (ref 4–6)
HBV SURFACE AB TITR SER: 214.1 MIU/ML
HDLC SERPL-MCNC: 43 MG/DL
LDL CHOLESTEROL: 53 MG/DL (ref 0–130)
POTASSIUM SERPL-SCNC: 4.2 MMOL/L (ref 3.7–5.3)
SODIUM BLD-SCNC: 137 MMOL/L (ref 135–144)
TOTAL PROTEIN: 6.4 G/DL (ref 6.4–8.3)
TRIGL SERPL-MCNC: 170 MG/DL
VLDLC SERPL CALC-MCNC: ABNORMAL MG/DL (ref 1–30)

## 2020-08-25 PROCEDURE — 99214 OFFICE O/P EST MOD 30 MIN: CPT | Performed by: FAMILY MEDICINE

## 2020-08-25 PROCEDURE — 36415 COLL VENOUS BLD VENIPUNCTURE: CPT | Performed by: FAMILY MEDICINE

## 2020-08-25 ASSESSMENT — ENCOUNTER SYMPTOMS
COUGH: 0
EYES NEGATIVE: 1
ALLERGIC/IMMUNOLOGIC NEGATIVE: 1
DIARRHEA: 0
ABDOMINAL PAIN: 0
SHORTNESS OF BREATH: 0
CONSTIPATION: 0
BLOOD IN STOOL: 0

## 2020-08-25 NOTE — PROGRESS NOTES
[facility]  [facility]  [address]  [facility] [address]  Dept: [phone]    8/25/2020    CHIEF COMPLAINT    Chief Complaint   Patient presents with    Hypertension    Diabetes    Hyperlipidemia       HPI    Real Baptiste is a 37 y.o. female who presents   Chief Complaint   Patient presents with    Hypertension    Diabetes    Hyperlipidemia   . Recheck chronic conditions and meds. Taking medications as prescribed for diabetes, hypertension, and hyperlipidemia With no side effects and good effectiveness. Has lost 40 pounds since march by improving diet. Is not testing blood sugar at home currently. Has noticed swelling and redness of her right dorsal foot for the past few weeks. Skin is dry and cracked at the border of her toes. Has a right great toenail which is ingrown. Diabetes   She presents for her follow-up diabetic visit. She has type 2 diabetes mellitus. Her disease course has been stable. There are no hypoglycemic associated symptoms. Pertinent negatives for hypoglycemia include no dizziness, headaches or nervousness/anxiousness. Pertinent negatives for diabetes include no chest pain and no fatigue. There are no hypoglycemic complications. Symptoms are stable. Pertinent negatives for diabetic complications include no heart disease. Risk factors for coronary artery disease include diabetes mellitus, hypertension, obesity and dyslipidemia. Current diabetic treatment includes oral agent (dual therapy) (tresiba). She is compliant with treatment most of the time. Her weight is decreasing steadily. She is following a generally healthy diet. Meal planning includes avoidance of concentrated sweets. She participates in exercise three times a week (walking). An ACE inhibitor/angiotensin II receptor blocker is being taken. Hypertension   This is a chronic problem. The current episode started more than 1 year ago.  The problem is controlled. Pertinent negatives include no chest pain, headaches or shortness of breath. Risk factors for coronary artery disease include obesity, diabetes mellitus and dyslipidemia. Past treatments include ACE inhibitors. The current treatment provides moderate improvement. Vitals:    08/25/20 0733   BP: 110/76   Temp: 97.2 °F (36.2 °C)   Weight: 283 lb (128.4 kg)       REVIEW OF SYSTEMS    Review of Systems   Constitutional: Negative for fatigue, fever and unexpected weight change. HENT: Negative. Eyes: Negative. Respiratory: Negative for cough and shortness of breath. Cardiovascular: Negative for chest pain and leg swelling. Gastrointestinal: Negative for abdominal pain, blood in stool, constipation and diarrhea. Endocrine: Negative. Genitourinary: Negative for frequency and urgency. Musculoskeletal: Positive for arthralgias (rt lateral hip pain, gets warm to the touch). Skin: Negative. Rt foot red, swollen   Allergic/Immunologic: Negative. Neurological: Negative for dizziness and headaches. Hematological: Negative. Psychiatric/Behavioral: Negative for sleep disturbance. The patient is not nervous/anxious.         PAST MEDICAL HISTORY    Past Medical History:   Diagnosis Date    Anxiety and depression     Depression     Diabetes mellitus ([address]     Migraines     Obesity     PONV (postoperative nausea and vomiting)     Seasonal allergies     Sleep apnea     uses cpap nightly    Type 2 diabetes mellitus without complication (HCC)        FAMILY HISTORY    Family History   Problem Relation Age of Onset    High Blood Pressure Mother     Breast Cancer Paternal Aunt     Heart Attack Maternal Grandfather     High Blood Pressure Maternal Grandfather     Stroke Maternal Grandfather     Diabetes Paternal Uncle     Cancer Maternal Aunt     Breast Cancer Maternal Aunt        SOCIAL HISTORY    Social History     Socioeconomic History    Marital status:  MOUTH DAILY 30 tablet 5    buPROPion (WELLBUTRIN XL) 300 MG extended release tablet TAKE ONE TABLET BY MOUTH DAILY 30 tablet 5    JANUMET -1000 MG TB24 TAKE ONE TABLET BY MOUTH DAILY 30 tablet 5    TRESIBA FLEXTOUCH 100 UNIT/ML SOPN INJECT 60 UNITS SUBCUTANEOUSLY DAILY 15 pen 3    naproxen (NAPROSYN) 500 MG tablet TAKE ONE TABLET BY MOUTH TWICE A DAY WITH FOOD (Patient taking differently: TAKE ONE TABLET BY MOUTH DAILY WITH FOOD) 60 tablet 3    Insulin Pen Needle (PEN NEEDLES) 31G X 6 MM MISC USE ONCE DAILY WITH TRESIBA 1 each 5    Cetirizine HCl (ZYRTEC PO) Take by mouth daily      glucose blood VI test strips (TRUETEST TEST) strip 1 each by In Vitro route 3 times daily As needed. (Patient not taking: Reported on 8/25/2020) 100 each 1    Butalbital-APAP-Caffeine (FIORICET PO) Take by mouth as needed       No current facility-administered medications for this visit. ALLERGIES    Allergies   Allergen Reactions    Penicillins Anaphylaxis       PHYSICAL EXAM   Physical Exam  Vitals signs reviewed. Constitutional:       Appearance: She is well-developed. She is obese. HENT:      Head: Normocephalic. Eyes:      Conjunctiva/sclera: Conjunctivae normal.      Pupils: Pupils are equal, round, and reactive to light. Neck:      Musculoskeletal: Normal range of motion and neck supple. Thyroid: No thyromegaly. Cardiovascular:      Rate and Rhythm: Normal rate and regular rhythm. Pulses:           Dorsalis pedis pulses are 2+ on the right side and 2+ on the left side. Posterior tibial pulses are 2+ on the right side and 2+ on the left side. Heart sounds: Normal heart sounds. No murmur. Pulmonary:      Effort: Pulmonary effort is normal.      Breath sounds: Normal breath sounds. No wheezing or rales. Abdominal:      Palpations: Abdomen is soft. Tenderness: There is no abdominal tenderness. There is no guarding or rebound. Musculoskeletal: Normal range of motion. General: Tenderness (rt lateral hip) present. No deformity. Right foot: Normal range of motion. No deformity. Left foot: Normal range of motion. No deformity. Feet:      Right foot:      Protective Sensation: 8 sites tested. 8 sites sensed. Skin integrity: No ulcer, blister, skin breakdown or callus. Toenail Condition: Right toenails are normal.      Left foot:      Protective Sensation: 8 sites tested. 8 sites sensed. Skin integrity: No ulcer, blister, skin breakdown or callus. Toenail Condition: Left toenails are normal.   Lymphadenopathy:      Cervical: No cervical adenopathy. Skin:     General: Skin is warm and dry. Findings: Rash (Mild erythema dorsum of right foot. Right great toenail is ingrown. Skin cracked and dry at the base of the toes. ) present. Neurological:      Mental Status: She is alert and oriented to person, place, and time. Psychiatric:         Behavior: Behavior normal.         Assessment/PLan  1. Controlled type 2 diabetes mellitus without complication, with long-term current use of insulin (HCC)  Chronic stable condition continue current medications until results of A1c are known. - Hemoglobin A1C; Future  -  DIABETES FOOT EXAM    2. Mixed hyperlipidemia  Chronic stable condition continue statin  - Lipid Panel; Future    3. Essential hypertension  Chronic stable. Continue meds and weight loss efforts. - Comprehensive Metabolic Panel; Future    4. Screening for endocrine, metabolic and immunity disorder  Has had hep B vaccines and needs to know her immunity status.  - Hepatitis B Surface Antibody; Future    5. Morbid obesity due to excess calories ([address]  Continue weight loss efforts    6. Tinea pedis of right foot  Try soaking the foot several times a day in warm soapy water. - nystatin-triamcinolone (MYCOLOG II) 706253-0.1 UNIT/GM-% cream; Apply topically 2 times daily. Dispense: 60 g; Refill: 1    7.  Trochanteric bursitis of right hip  NSAIDs may be helpful if not improving will refer to orthopedist.      Susan Tillman received counseling on the following healthy behaviors: nutrition, exercise and medication adherence  Reviewed prior labs and health maintenance. Continue current medications, diet and exercise. Discussed use, benefit, and side effects of prescribed medications. Barriers to medication compliance addressed. Patient given educational materials - see patient instructions. All patient questions answered. Patient voiced understanding. Return in about 6 months (around 2/25/2021) for htn, weight check, diabetes.         Electronically signed by Gabrielle Gil MD on 8/25/20 at 7:39 AM EDT

## 2020-11-09 ENCOUNTER — OFFICE VISIT (OUTPATIENT)
Dept: FAMILY MEDICINE CLINIC | Age: 35
End: 2020-11-09
Payer: COMMERCIAL

## 2020-11-09 VITALS
SYSTOLIC BLOOD PRESSURE: 130 MMHG | HEART RATE: 98 BPM | BODY MASS INDEX: 51.91 KG/M2 | OXYGEN SATURATION: 100 % | HEIGHT: 63 IN | DIASTOLIC BLOOD PRESSURE: 78 MMHG | WEIGHT: 293 LBS | TEMPERATURE: 97.3 F

## 2020-11-09 LAB
CREATININE URINE POCT: NORMAL
HBA1C MFR BLD: 8.9 %
MICROALBUMIN/CREAT 24H UR: NORMAL MG/G{CREAT}
MICROALBUMIN/CREAT UR-RTO: NORMAL

## 2020-11-09 PROCEDURE — 99214 OFFICE O/P EST MOD 30 MIN: CPT | Performed by: FAMILY MEDICINE

## 2020-11-09 PROCEDURE — 83036 HEMOGLOBIN GLYCOSYLATED A1C: CPT | Performed by: FAMILY MEDICINE

## 2020-11-09 PROCEDURE — 82044 UR ALBUMIN SEMIQUANTITATIVE: CPT | Performed by: FAMILY MEDICINE

## 2020-11-09 PROCEDURE — 3052F HG A1C>EQUAL 8.0%<EQUAL 9.0%: CPT | Performed by: FAMILY MEDICINE

## 2020-11-09 RX ORDER — INSULIN DEGLUDEC INJECTION 100 U/ML
70 INJECTION, SOLUTION SUBCUTANEOUS DAILY
Qty: 15 PEN | Refills: 2
Start: 2020-11-09 | End: 2021-01-03

## 2020-11-09 RX ORDER — EMPAGLIFLOZIN 10 MG/1
1 TABLET, FILM COATED ORAL DAILY
Qty: 30 TABLET | Refills: 5 | Status: SHIPPED | OUTPATIENT
Start: 2020-11-09 | End: 2021-05-28

## 2020-11-09 RX ORDER — ATORVASTATIN CALCIUM 20 MG/1
20 TABLET, FILM COATED ORAL DAILY
Qty: 30 TABLET | Refills: 5 | Status: SHIPPED | OUTPATIENT
Start: 2020-11-09 | End: 2021-05-28

## 2020-11-09 ASSESSMENT — ENCOUNTER SYMPTOMS
CONSTIPATION: 0
ALLERGIC/IMMUNOLOGIC NEGATIVE: 1
DIARRHEA: 0
ABDOMINAL PAIN: 0
APNEA: 1
COUGH: 0
BACK PAIN: 1
SHORTNESS OF BREATH: 0
EYES NEGATIVE: 1

## 2020-11-09 NOTE — PROGRESS NOTES
[facility]  [facility]  [address]  [facility] [address]  Dept: [phone]    11/9/2020    CHIEF COMPLAINT    Chief Complaint   Patient presents with    Diabetes       HPI    Kayla Bass is a 37 y.o. female who presents   Chief Complaint   Patient presents with    Diabetes   . Recheck chronic conditions. Taking medications as prescribed for diabetes, hypertension, hyperlipidemia, depression and headaches with no side effects and good effectiveness. tresiba was increased to 70 units at last appt. bs running over 200 at times. Trying to get regular exercise and following low-carb diet. Depression has been somewhat uncontrolled recently with feelings of decreased motivation and sadness. She and her  have decided not to pursue having any more children. On the positive side she has had a good promotion at work. 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 She is trying to get walking in at work and sometimes at home. Diabetes   She presents for her follow-up diabetic visit. She has type 2 diabetes mellitus. Her disease course has been stable. There are no hypoglycemic associated symptoms. Pertinent negatives for hypoglycemia include no dizziness, headaches or nervousness/anxiousness. Associated symptoms include fatigue. Pertinent negatives for diabetes include no chest pain. There are no hypoglycemic complications. Pertinent negatives for diabetic complications include no heart disease, nephropathy, peripheral neuropathy or retinopathy. Risk factors for coronary artery disease include obesity, dyslipidemia, diabetes mellitus and hypertension. She is compliant with treatment most of the time. Her weight is stable. She is following a generally healthy diet. She has not had a previous visit with a dietitian. She participates in exercise intermittently. Her home blood glucose trend is increasing steadily.  An ACE inhibitor/angiotensin II receptor blocker is being taken. She does not see a podiatrist.Eye exam is not current. Vitals:    11/09/20 0805   BP: 130/78   Pulse: 98   Temp: 97.3 °F (36.3 °C)   SpO2: 100%   Weight: (!) 318 lb 9.6 oz (144.5 kg)   Height: 5' 3\" (1.6 m)       REVIEW OF SYSTEMS    Review of Systems   Constitutional: Positive for fatigue. Negative for fever and unexpected weight change. HENT: Negative. Eyes: Negative. Respiratory: Positive for apnea (using cpap). Negative for cough and shortness of breath. Cardiovascular: Negative for chest pain and leg swelling. Gastrointestinal: Negative for abdominal pain, constipation and diarrhea. Endocrine: Negative. Genitourinary: Positive for menstrual problem (irregular, lighter). Negative for frequency and urgency. Musculoskeletal: Positive for back pain (  Intermittent). Skin: Negative. Allergic/Immunologic: Negative. Neurological: Negative for dizziness and headaches. Hematological: Negative. Psychiatric/Behavioral: Positive for dysphoric mood (not controlled currently, sad about not being able to have another baby). Negative for sleep disturbance. The patient is not nervous/anxious.         PAST MEDICAL HISTORY    Past Medical History:   Diagnosis Date    Anxiety and depression     Depression     Diabetes mellitus ([address]     Migraines     Obesity     PONV (postoperative nausea and vomiting)     Seasonal allergies     Sleep apnea     uses cpap nightly    Type 2 diabetes mellitus without complication (HCC)        FAMILY HISTORY    Family History   Problem Relation Age of Onset    High Blood Pressure Mother     Breast Cancer Paternal Aunt     Heart Attack Maternal Grandfather     High Blood Pressure Maternal Grandfather     Stroke Maternal Grandfather     Diabetes Paternal Uncle     Cancer Maternal Aunt     Breast Cancer Maternal Aunt        SOCIAL HISTORY    Social History     Socioeconomic History    Marital status:  Spouse name: None    Number of children: 2    Years of education: None    Highest education level: None   Occupational History    Occupation: supervisor   Social Needs    Financial resource strain: None    Food insecurity     Worry: None     Inability: None    Transportation needs     Medical: None     Non-medical: None   Tobacco Use    Smoking status: Former Smoker     Packs/day: 0.50     Years: 10.00     Pack years: 5.00     Types: Cigarettes    Smokeless tobacco: Never Used    Tobacco comment: quit smoking 4 yrs ago   Substance and Sexual Activity    Alcohol use: Yes     Comment: social    Drug use: No    Sexual activity: Yes     Partners: Male   Lifestyle    Physical activity     Days per week: None     Minutes per session: None    Stress: None   Relationships    Social connections     Talks on phone: None     Gets together: None     Attends Church service: None     Active member of club or organization: None     Attends meetings of clubs or organizations: None     Relationship status: None    Intimate partner violence     Fear of current or ex partner: None     Emotionally abused: None     Physically abused: None     Forced sexual activity: None   Other Topics Concern    None   Social History Narrative    None       SURGICAL HISTORY    Past Surgical History:   Procedure Laterality Date     SECTION      LAPAROSCOPY  2018    LAPAROSCOPY DIAGNOSTIC (N/A Abdomen), Lysis of adhesions    [place] LAP,DIAGNOSTIC ABDOMEN N/A 2018    LAPAROSCOPY DIAGNOSTIC performed by Jo Ann Hernandez MD at [facility]      lt wrist age 7yrs       CURRENT MEDICATIONS    Current Outpatient Medications   Medication Sig Dispense Refill    empagliflozin (JARDIANCE) 10 MG tablet Take 1 tablet by mouth daily 30 tablet 5    atorvastatin (LIPITOR) 20 MG tablet Take 1 tablet by mouth daily 30 tablet 5    TRESIBA FLEXTOUCH 100 UNIT/ML SOPN Inject 70 Units into the skin daily 15 pen 2    naproxen (NAPROSYN) 500 MG tablet TAKE ONE TABLET BY MOUTH TWICE A DAY WITH FOOD 60 tablet 3    lisinopril (PRINIVIL;ZESTRIL) 5 MG tablet TAKE ONE TABLET BY MOUTH DAILY 30 tablet 5    buPROPion (WELLBUTRIN XL) 300 MG extended release tablet TAKE ONE TABLET BY MOUTH DAILY 30 tablet 5    JANUMET -1000 MG TB24 TAKE ONE TABLET BY MOUTH DAILY 30 tablet 5    Insulin Pen Needle (PEN NEEDLES) 31G X 6 MM MISC USE ONCE DAILY WITH TRESIBA 1 each 5    glucose blood VI test strips (TRUETEST TEST) strip 1 each by In Vitro route 3 times daily As needed. 100 each 1    Cetirizine HCl (ZYRTEC PO) Take by mouth daily      Butalbital-APAP-Caffeine (FIORICET PO) Take by mouth as needed      nystatin-triamcinolone (MYCOLOG II) 116381-3.1 UNIT/GM-% cream Apply topically 2 times daily. (Patient not taking: Reported on 11/9/2020) 60 g 1     No current facility-administered medications for this visit. ALLERGIES    Allergies   Allergen Reactions    Penicillins Anaphylaxis       PHYSICAL EXAM   Physical Exam  Vitals signs reviewed. Constitutional:       Appearance: She is well-developed. She is obese. HENT:      Head: Normocephalic. Eyes:      Conjunctiva/sclera: Conjunctivae normal.      Pupils: Pupils are equal, round, and reactive to light. Neck:      Musculoskeletal: Normal range of motion and neck supple. Thyroid: No thyromegaly. Cardiovascular:      Rate and Rhythm: Normal rate and regular rhythm. Heart sounds: Normal heart sounds. No murmur. Pulmonary:      Effort: Pulmonary effort is normal.      Breath sounds: Normal breath sounds. No wheezing or rales. Abdominal:      Palpations: Abdomen is soft. Tenderness: There is no abdominal tenderness. There is no guarding or rebound. Comments: Obese   Musculoskeletal: Normal range of motion. General: Tenderness (  Mid low back) present. No deformity. Lymphadenopathy:      Cervical: No cervical adenopathy.    Skin: General: Skin is warm and dry. Neurological:      Mental Status: She is alert and oriented to person, place, and time. Psychiatric:         Behavior: Behavior normal.         ASSESSMENT/PLAN  1. Controlled type 2 diabetes mellitus without complication, with long-term current use of insulin ([facility])  A1c slightly improved but still high at 8.9. Will continue same dose of Rubens Ron and add Jardiance. Discussed some potential side effects. Continue low-carb diet and trying to get regular exercise. - POCT glycosylated hemoglobin (Hb A1C)  - empagliflozin (JARDIANCE) 10 MG tablet; Take 1 tablet by mouth daily  Dispense: 30 tablet; Refill: 5  - TRESIBA FLEXTOUCH 100 UNIT/ML SOPN; Inject 70 Units into the skin daily  Dispense: 15 pen; Refill: 2  - POCT microalbumin  Results for orders placed or performed in visit on 11/09/20   POCT glycosylated hemoglobin (Hb A1C)   Result Value Ref Range    Hemoglobin A1C 8.9 %   POCT microalbumin   Result Value Ref Range    Microalb, Ur 30 mg/L     Creatinine Ur POCT 200 mg/L     Microalbumin Creatinine Ratio < 30 mg/g        2. Mixed hyperlipidemia  Chronic, stable condition. Continue statin and healthy diet  - atorvastatin (LIPITOR) 20 MG tablet; Take 1 tablet by mouth daily  Dispense: 30 tablet; Refill: 5    3. Essential hypertension  Chronic stable condition. 4. Morbid obesity due to excess calories ([address]  Continue weight loss efforts with low-carb diet    5. Dysthymia  Discussed focusing on positive aspects of her life and is reconciled with her decision not to pursue having any more children. Francis Coronado received counseling on the following healthy behaviors: nutrition, exercise and medication adherence  Reviewed prior labs and health maintenance. Continue current medications, diet and exercise. Discussed use, benefit, and side effects of prescribed medications. Barriers to medication compliance addressed.    Patient given educational materials - see patient instructions. All patient questions answered. Patient voiced understanding. Return in about 3 months (around 2/9/2021) for diabetes, htn.         Electronically signed by Danielle Bermudez MD on 11/9/20 at 8:11 AM EST

## 2020-11-23 RX ORDER — SITAGLIPTIN AND METFORMIN HYDROCHLORIDE 100; 1000 MG/1; MG/1
TABLET, FILM COATED, EXTENDED RELEASE ORAL
Qty: 30 TABLET | Refills: 4 | Status: SHIPPED | OUTPATIENT
Start: 2020-11-23 | End: 2021-05-07

## 2020-11-23 RX ORDER — BUPROPION HYDROCHLORIDE 300 MG/1
TABLET ORAL
Qty: 30 TABLET | Refills: 4 | Status: SHIPPED | OUTPATIENT
Start: 2020-11-23 | End: 2021-05-07

## 2020-12-24 RX ORDER — LISINOPRIL 5 MG/1
TABLET ORAL
Qty: 30 TABLET | Refills: 5 | Status: SHIPPED | OUTPATIENT
Start: 2020-12-24 | End: 2021-07-20

## 2021-01-01 DIAGNOSIS — Z79.4 CONTROLLED TYPE 2 DIABETES MELLITUS WITHOUT COMPLICATION, WITH LONG-TERM CURRENT USE OF INSULIN (HCC): ICD-10-CM

## 2021-01-01 DIAGNOSIS — E11.9 CONTROLLED TYPE 2 DIABETES MELLITUS WITHOUT COMPLICATION, WITH LONG-TERM CURRENT USE OF INSULIN (HCC): ICD-10-CM

## 2021-01-03 RX ORDER — INSULIN DEGLUDEC INJECTION 100 U/ML
INJECTION, SOLUTION SUBCUTANEOUS
Qty: 15 PEN | Refills: 1 | Status: SHIPPED | OUTPATIENT
Start: 2021-01-03 | End: 2021-02-15

## 2021-02-09 ENCOUNTER — OFFICE VISIT (OUTPATIENT)
Dept: FAMILY MEDICINE CLINIC | Age: 36
End: 2021-02-09
Payer: COMMERCIAL

## 2021-02-09 ENCOUNTER — HOSPITAL ENCOUNTER (OUTPATIENT)
Age: 36
Setting detail: SPECIMEN
Discharge: HOME | End: 2021-02-09
Payer: COMMERCIAL

## 2021-02-09 VITALS
TEMPERATURE: 96.8 F | BODY MASS INDEX: 51.91 KG/M2 | HEART RATE: 68 BPM | DIASTOLIC BLOOD PRESSURE: 76 MMHG | WEIGHT: 293 LBS | SYSTOLIC BLOOD PRESSURE: 128 MMHG | HEIGHT: 63 IN | RESPIRATION RATE: 18 BRPM | OXYGEN SATURATION: 98 %

## 2021-02-09 DIAGNOSIS — R53.82 CHRONIC FATIGUE: ICD-10-CM

## 2021-02-09 DIAGNOSIS — Z79.4 CONTROLLED TYPE 2 DIABETES MELLITUS WITHOUT COMPLICATION, WITH LONG-TERM CURRENT USE OF INSULIN (HCC): ICD-10-CM

## 2021-02-09 DIAGNOSIS — Z79.4 CONTROLLED TYPE 2 DIABETES MELLITUS WITHOUT COMPLICATION, WITH LONG-TERM CURRENT USE OF INSULIN (HCC): Primary | ICD-10-CM

## 2021-02-09 DIAGNOSIS — R74.8 ELEVATED LIVER ENZYMES: ICD-10-CM

## 2021-02-09 DIAGNOSIS — Z11.59 NEED FOR HEPATITIS C SCREENING TEST: ICD-10-CM

## 2021-02-09 DIAGNOSIS — I10 ESSENTIAL HYPERTENSION: ICD-10-CM

## 2021-02-09 DIAGNOSIS — F34.1 DYSTHYMIA: ICD-10-CM

## 2021-02-09 DIAGNOSIS — E11.9 CONTROLLED TYPE 2 DIABETES MELLITUS WITHOUT COMPLICATION, WITH LONG-TERM CURRENT USE OF INSULIN (HCC): Primary | ICD-10-CM

## 2021-02-09 DIAGNOSIS — E11.9 CONTROLLED TYPE 2 DIABETES MELLITUS WITHOUT COMPLICATION, WITH LONG-TERM CURRENT USE OF INSULIN (HCC): ICD-10-CM

## 2021-02-09 DIAGNOSIS — E66.01 MORBID OBESITY DUE TO EXCESS CALORIES (HCC): ICD-10-CM

## 2021-02-09 DIAGNOSIS — L60.0 INGROWN TOENAIL OF BOTH FEET: ICD-10-CM

## 2021-02-09 LAB
ABSOLUTE EOS #: 0.14 K/UL (ref 0–0.44)
ABSOLUTE IMMATURE GRANULOCYTE: 0.11 K/UL (ref 0–0.3)
ABSOLUTE LYMPH #: 1.34 K/UL (ref 1.1–3.7)
ABSOLUTE MONO #: 0.52 K/UL (ref 0.1–1.2)
ALBUMIN SERPL-MCNC: 4.1 G/DL (ref 3.5–5.2)
ALBUMIN/GLOBULIN RATIO: 1.7 (ref 1–2.5)
ALP BLD-CCNC: 66 U/L (ref 35–104)
ALT SERPL-CCNC: 23 U/L (ref 5–33)
ANION GAP SERPL CALCULATED.3IONS-SCNC: 10 MMOL/L (ref 9–17)
AST SERPL-CCNC: 16 U/L
BASOPHILS # BLD: 1 % (ref 0–2)
BASOPHILS ABSOLUTE: 0.04 K/UL (ref 0–0.2)
BILIRUB SERPL-MCNC: 0.37 MG/DL (ref 0.3–1.2)
BUN BLDV-MCNC: 19 MG/DL (ref 6–20)
BUN/CREAT BLD: ABNORMAL (ref 9–20)
CALCIUM SERPL-MCNC: 9.2 MG/DL (ref 8.6–10.4)
CHLORIDE BLD-SCNC: 106 MMOL/L (ref 98–107)
CO2: 24 MMOL/L (ref 20–31)
CREAT SERPL-MCNC: 0.69 MG/DL (ref 0.5–0.9)
DIFFERENTIAL TYPE: ABNORMAL
EOSINOPHILS RELATIVE PERCENT: 2 % (ref 1–4)
FERRITIN: 48 UG/L (ref 13–150)
GFR AFRICAN AMERICAN: >60 ML/MIN
GFR NON-AFRICAN AMERICAN: >60 ML/MIN
GFR SERPL CREATININE-BSD FRML MDRD: ABNORMAL ML/MIN/{1.73_M2}
GFR SERPL CREATININE-BSD FRML MDRD: ABNORMAL ML/MIN/{1.73_M2}
GLUCOSE BLD-MCNC: 122 MG/DL (ref 70–99)
HBA1C MFR BLD: 6.8 %
HCT VFR BLD CALC: 40.8 % (ref 36.3–47.1)
HEMOGLOBIN: 12.6 G/DL (ref 11.9–15.1)
HEPATITIS C ANTIBODY: NONREACTIVE
IMMATURE GRANULOCYTES: 1 %
LYMPHOCYTES # BLD: 17 % (ref 24–43)
MCH RBC QN AUTO: 27.2 PG (ref 25.2–33.5)
MCHC RBC AUTO-ENTMCNC: 30.9 G/DL (ref 28.4–34.8)
MCV RBC AUTO: 87.9 FL (ref 82.6–102.9)
MONOCYTES # BLD: 7 % (ref 3–12)
NRBC AUTOMATED: 0 PER 100 WBC
PDW BLD-RTO: 13.5 % (ref 11.8–14.4)
PLATELET # BLD: 215 K/UL (ref 138–453)
PLATELET ESTIMATE: ABNORMAL
PMV BLD AUTO: 13 FL (ref 8.1–13.5)
POTASSIUM SERPL-SCNC: 4.3 MMOL/L (ref 3.7–5.3)
RBC # BLD: 4.64 M/UL (ref 3.95–5.11)
RBC # BLD: ABNORMAL 10*6/UL
SEG NEUTROPHILS: 72 % (ref 36–65)
SEGMENTED NEUTROPHILS ABSOLUTE COUNT: 5.79 K/UL (ref 1.5–8.1)
SODIUM BLD-SCNC: 140 MMOL/L (ref 135–144)
TOTAL PROTEIN: 6.5 G/DL (ref 6.4–8.3)
TSH SERPL DL<=0.05 MIU/L-ACNC: 1.16 MIU/L (ref 0.3–5)
VITAMIN B-12: 574 PG/ML (ref 232–1245)
VITAMIN D 25-HYDROXY: 9.2 NG/ML (ref 30–100)
WBC # BLD: 7.9 K/UL (ref 3.5–11.3)
WBC # BLD: ABNORMAL 10*3/UL

## 2021-02-09 PROCEDURE — 83036 HEMOGLOBIN GLYCOSYLATED A1C: CPT | Performed by: FAMILY MEDICINE

## 2021-02-09 PROCEDURE — 36415 COLL VENOUS BLD VENIPUNCTURE: CPT | Performed by: FAMILY MEDICINE

## 2021-02-09 PROCEDURE — 99214 OFFICE O/P EST MOD 30 MIN: CPT | Performed by: FAMILY MEDICINE

## 2021-02-09 SDOH — ECONOMIC STABILITY: INCOME INSECURITY: HOW HARD IS IT FOR YOU TO PAY FOR THE VERY BASICS LIKE FOOD, HOUSING, MEDICAL CARE, AND HEATING?: NOT HARD AT ALL

## 2021-02-09 SDOH — ECONOMIC STABILITY: TRANSPORTATION INSECURITY
IN THE PAST 12 MONTHS, HAS THE LACK OF TRANSPORTATION KEPT YOU FROM MEDICAL APPOINTMENTS OR FROM GETTING MEDICATIONS?: NO

## 2021-02-09 ASSESSMENT — ENCOUNTER SYMPTOMS
ABDOMINAL PAIN: 0
EYES NEGATIVE: 1
COUGH: 0
DIARRHEA: 0
ALLERGIC/IMMUNOLOGIC NEGATIVE: 1
CONSTIPATION: 0
BLOOD IN STOOL: 0
SHORTNESS OF BREATH: 0

## 2021-02-10 LAB — IRON: 58 UG/DL (ref 37–145)

## 2021-02-11 DIAGNOSIS — E55.9 VITAMIN D DEFICIENCY: Primary | ICD-10-CM

## 2021-02-11 RX ORDER — CHOLECALCIFEROL (VITAMIN D3) 125 MCG
1 CAPSULE ORAL DAILY
Qty: 90 CAPSULE | Refills: 3 | Status: SHIPPED | OUTPATIENT
Start: 2021-02-11 | End: 2022-01-28

## 2021-02-14 DIAGNOSIS — E11.9 CONTROLLED TYPE 2 DIABETES MELLITUS WITHOUT COMPLICATION, WITH LONG-TERM CURRENT USE OF INSULIN (HCC): ICD-10-CM

## 2021-02-14 DIAGNOSIS — Z79.4 CONTROLLED TYPE 2 DIABETES MELLITUS WITHOUT COMPLICATION, WITH LONG-TERM CURRENT USE OF INSULIN (HCC): ICD-10-CM

## 2021-02-15 RX ORDER — INSULIN DEGLUDEC INJECTION 100 U/ML
INJECTION, SOLUTION SUBCUTANEOUS
Qty: 15 PEN | Refills: 3 | Status: SHIPPED | OUTPATIENT
Start: 2021-02-15 | End: 2021-05-28

## 2021-02-18 ENCOUNTER — OFFICE VISIT (OUTPATIENT)
Dept: PODIATRY | Age: 36
End: 2021-02-18
Payer: COMMERCIAL

## 2021-02-18 VITALS — TEMPERATURE: 96.4 F | WEIGHT: 293 LBS | HEIGHT: 64 IN | RESPIRATION RATE: 16 BRPM | BODY MASS INDEX: 50.02 KG/M2

## 2021-02-18 DIAGNOSIS — L03.031 CELLULITIS OF GREAT TOE OF RIGHT FOOT: ICD-10-CM

## 2021-02-18 DIAGNOSIS — M79.671 PAIN IN RIGHT FOOT: ICD-10-CM

## 2021-02-18 DIAGNOSIS — M79.672 PAIN IN BOTH FEET: ICD-10-CM

## 2021-02-18 DIAGNOSIS — E11.51 TYPE II DIABETES MELLITUS WITH PERIPHERAL CIRCULATORY DISORDER (HCC): Primary | ICD-10-CM

## 2021-02-18 DIAGNOSIS — L60.0 INGROWING NAIL: ICD-10-CM

## 2021-02-18 DIAGNOSIS — M79.671 PAIN IN BOTH FEET: ICD-10-CM

## 2021-02-18 PROCEDURE — 11750 EXCISION NAIL&NAIL MATRIX: CPT | Performed by: PODIATRIST

## 2021-02-18 PROCEDURE — 99204 OFFICE O/P NEW MOD 45 MIN: CPT | Performed by: PODIATRIST

## 2021-02-18 NOTE — PROGRESS NOTES
buPROPion (WELLBUTRIN XL) 300 MG extended release tablet TAKE ONE TABLET BY MOUTH DAILY 20  Yes Sanjeev Rivero MD   JANUMET -1000 MG TB24 TAKE ONE TABLET BY MOUTH DAILY 20  Yes Sanjeev Rivero MD   empagliflozin (JARDIANCE) 10 MG tablet Take 1 tablet by mouth daily 20  Yes Sanjeev Rivero MD   atorvastatin (LIPITOR) 20 MG tablet Take 1 tablet by mouth daily 20  Yes Sanjeev Rivero MD   naproxen (NAPROSYN) 500 MG tablet TAKE ONE TABLET BY MOUTH TWICE A DAY WITH FOOD 10/20/20  Yes Sanjeev Rivero MD   Insulin Pen Needle (PEN NEEDLES) 31G X 6 MM MISC USE ONCE DAILY WITH TRESIBA 20  Yes Sanjeev Rivero MD   glucose blood VI test strips (TRUETEST TEST) strip 1 each by In Vitro route 3 times daily As needed.  5/10/18  Yes Sanjeev Rivero MD   Cetirizine HCl (ZYRTEC PO) Take by mouth daily   Yes Leif Arcos MD       Past Surgical History:   Procedure Laterality Date     SECTION      LAPAROSCOPY  2018    LAPAROSCOPY DIAGNOSTIC (N/A Abdomen), Lysis of adhesions    [place] LAP,DIAGNOSTIC ABDOMEN N/A 2018    LAPAROSCOPY DIAGNOSTIC performed by Branden Shields MD at [address]      lt wrist age 7yrs       Family History   Problem Relation Age of Onset    High Blood Pressure Mother     Breast Cancer Paternal Aunt     Heart Attack Maternal Grandfather     High Blood Pressure Maternal Grandfather     Stroke Maternal Grandfather     Diabetes Paternal Uncle     Cancer Maternal Aunt     Breast Cancer Maternal Aunt        Social History     Tobacco Use    Smoking status: Former Smoker     Packs/day: 0.50     Years: 10.00     Pack years: 5.00     Types: Cigarettes    Smokeless tobacco: Never Used    Tobacco comment: quit smoking 4 yrs ago   Substance Use Topics    Alcohol use: Yes     Comment: social       Review of Systems    Review of Systems:   History obtained from chart review and the patient General ROS: negative for - chills, fatigue, fever, night sweats or weight gain  Constitutional: Negative for chills, diaphoresis, fatigue, fever and unexpected weight change. Musculoskeletal: Positive for arthralgias, gait problem and joint swelling. Neurological ROS: negative for - behavioral changes, confusion, headaches or seizures. Negative for weakness and numbness. Dermatological ROS: negative for - mole changes, rash  Cardiovascular: Negative for leg swelling. Gastrointestinal: Negative for constipation, diarrhea, nausea and vomiting. Lower Extremity Physical Examination:   Vitals:   Vitals:    02/18/21 0806   Resp: 16   Temp: 96.4 °F (35.8 °C)     General: AAO x 3 in NAD. Dermatologic Exam:  Erythema and edema to the right great toenail     Musculoskeletal:     1st MPJ ROM decreased, Bilateral.  Muscle strength 5/5, Bilateral.  Pain present upon palpation of right great toenail to the medial and lateral nail border. .  Medial longitudinal arch, Bilateral WNL.   Ankle ROM WNL,Bilateral.    Dorsally contracted digits absent digits 1-5 Bilateral.     Vascular: DP and PT pulses palpable 2/4, Bilateral.  CFT <3 seconds, Bilateral.  Hair growth present to the level of the digits, Bilateral.  Edema absent, Bilateral.  Varicosities absent, Bilateral. Erythema absent, Bilateral    Neurological: Sensation intact to light touch to level of digits, Bilateral.  Protective sensation intact 10/10 sites via 5.07/10g Shelley-Meenu Monofilament, Bilateral.  negative Tinel's, Bilateral.  negative Valleix sign, Bilateral.      Integument: Warm, dry, supple, Bilateral.  Open lesion absent, Bilateral.  Interdigital maceration absent to web spaces 1-4, Bilateral.  Nails are normal in length, thickness and color 1-5 bilateral.  Fissures absent, Bilateral.     Asessment: Patient is a 37 y.o. female with:    Diagnosis Orders 1. Type II diabetes mellitus with peripheral circulatory disorder (HCC)  [facility] DI[facility]ETES FOOT EXAM    12038 - CT REMOVAL OF NAIL BED   2. Pain in both feet  [facility] DI[facility]ETES FOOT EXAM    22697 - CT REMOVAL OF NAIL BED   3. Ingrowing nail  [facility] DI[facility]ETES FOOT EXAM    38097 - CT REMOVAL OF NAIL BED   4. Cellulitis of great toe of right foot  [facility] DI[facility]ETES FOOT EXAM    74711 - CT REMOVAL OF NAIL BED   5. Pain in right foot  [facility] DIABETES FOOT EXAM    61013 - CT REMOVAL OF NAIL BED         Plan: Patient examined and evaluated. Current condition and treatment options discussed in detail. Discussed conservative and surgical options with the patient. Advised pt to her treatmement options      Verbal consent obtained for chemical matrixectomy after all questions answered. Local anesthesia obtained via Hallux block to the Right hallux utilizing 5 cc of 1% Lidocaine plain. Next the Right hallux was prepped with Betadine. A digital tourniquet was applied. A freer elevator was used to free the medial and lateral  nail border. An english anvil was used to remove the offending border in total.  A curette was used to ensure the offending border was free of any remaining nail. Next, three 30 second applications of 26% Phenol were applied to the offending nail border followed by an isopropyl alcohol flush. Triple antibiotic ointment was applied to the nail border followed by a semi-compressive dressing. The patient was instructed to leave this dressing clean/dry/intact until the following am at which point they will begin warm epsom salt soaks followed by application of antibiotic ointment and Band-Aid BID. Patient instructed to take Tylenol PRN pain. Post-operative chemical matrixectomy instruction sheet dispensed to patient. .  Verbal and written instructions given to patient. Contact office with any questions/problems/concerns.   RTC in 2week(s)      Patient Instructions: Ingrown Toe Nail Removal 1. Antibiotic ointment was applied to the toe immediately after the procedure. The ointment is soothing and helps the toe to heal faster. You should apply the antibiotic ointment twice daily until the wound is completely healed. 2. Leave your bandage clean and dry for the remainder of the day. Beginning tomorrow you may remove bandage and shower. Following your shower, soak the toe in warm water and epsom salts for 10 min - perform the epsom salt soaks twice daily for 2 weeks. Gently dry the area and apply antibiotic ointment. Avoid baths and swimming pools for the next 2 weeks. 3. Your bandage will help to pad and protect the wound, while absorbing drainage from the wound. The toe may drain clear fluid for up to 2 weeks (this is normal). You can replace the bandage if blood or fluid soaks the bandage. 4. You may experience some pain after the procedure. If you experience discomfort, elevate your foot. You may take over the counter Tylenol (Acetaminophen) two 325-mg tablets every 4 hours as needed. 5. You should wear loose-fitting shoes or sneakers for the first 2 weeks after the procedure. You should avoid running, jumping, or strenuous activity for 2 weeks after the surgery. 6. If you notice any signs of infection including: increased temperature/swelling/redness, thick yellow drainage, fever/chills/nausea/vomiting, please contact the office as soon as possible. .      All labs were reviewed and all imagining including the above findings were reviewed prior to the patients arrival and with the patient today. Previous patient encounter was reviewed. Encounters from the patients other medical providers were reviewed and noted. Time was spent educating the patient and their families/caregivers on proper care of the feet and ankles. All the above diagnosis were addressed at todays visit and all questions were answered.   A total of 50 minutes was spent with this patients encounter 2/18/2021    Electronically signed by Jluis Merino DPM on 2/18/2021 at 8:11 AM  2/18/2021

## 2021-03-04 ENCOUNTER — OFFICE VISIT (OUTPATIENT)
Dept: PODIATRY | Age: 36
End: 2021-03-04

## 2021-03-04 VITALS — HEIGHT: 64 IN | BODY MASS INDEX: 50.02 KG/M2 | TEMPERATURE: 96.5 F | WEIGHT: 293 LBS | RESPIRATION RATE: 18 BRPM

## 2021-03-04 DIAGNOSIS — E11.51 TYPE II DIABETES MELLITUS WITH PERIPHERAL CIRCULATORY DISORDER (HCC): Primary | ICD-10-CM

## 2021-03-04 DIAGNOSIS — G89.18 POST-OP PAIN: ICD-10-CM

## 2021-03-04 PROCEDURE — 99024 POSTOP FOLLOW-UP VISIT: CPT | Performed by: PODIATRIST

## 2021-03-04 NOTE — PROGRESS NOTES
[facility]  [facility]  [address] [address] [place] [address]  Dept: [phone]  Dept Fax: [phone]    POST-OP PROGRESS NOTE  Date of patient's visit: 3/4/2021  Patient's Name:  Nav Bennett YOB: 1985            Patient Care Team:  Lia Patel MD as PCP - Lisa Blevins MD as PCP - [facility]  Marie Moreau DPM as Physician (Podiatry)        Chief Complaint   Patient presents with    Post-Op Check     post op nail procedure    Diabetes       Pt's primary care physician is Lia Patel MD last seen 02/09/2021     Subjective: Nav Bennett is a 37 y.o. female who presents to the office today 2 weeks  S/P a for correction of ingrown toenail infection . Sh ehas been using the bactorban to the area and soaking her feet daily   Problem List Items Addressed This Visit     None      Visit Diagnoses     Type II diabetes mellitus with peripheral circulatory disorder (HCC)    -  Primary    Post-op pain          . Patient relates pain is Present and improved. Pain is rated 1 out of 10 and is described as mild. Currently denies F/C/N/V. Is patient taking pain medications as prescribed and is controlling pain    Physical Examination:  Dried serous fluid to the affected nail border. .  No purulence. Nail is free from the skin edge. No signs of infection. Assessment: Nav Bennett is status post as above  Normal post operative course. Doing well          ICD-10-CM    1. Type II diabetes mellitus with peripheral circulatory disorder (HCC)  E11.51    2. Post-op pain  G89.18          Plan:  Patient examined and evaluated. Current condition and treatment options discussed in detail. Advised pt to her condition. Advised pt to soak for one more week. Keep bandaid on during the day and remove at night for one more week. .  Verbal and written instructions given to patient.   Orders: No orders of the defined types were placed in this encounter. Contact office with any questions/problems/concerns. RTC in PRN.      Electronically signed by Veto Pallas, DPM on 3/4/2021 at 8:36 AM  3/4/2021

## 2021-04-12 RX ORDER — PEN NEEDLE, DIABETIC 31 G X1/4"
NEEDLE, DISPOSABLE MISCELLANEOUS
Qty: 100 EACH | Refills: 4 | Status: SHIPPED | OUTPATIENT
Start: 2021-04-12 | End: 2022-07-14

## 2021-05-07 DIAGNOSIS — F33.42 RECURRENT MAJOR DEPRESSIVE DISORDER, IN FULL REMISSION (HCC): ICD-10-CM

## 2021-05-07 RX ORDER — BUPROPION HYDROCHLORIDE 300 MG/1
TABLET ORAL
Qty: 30 TABLET | Refills: 5 | Status: SHIPPED | OUTPATIENT
Start: 2021-05-07 | End: 2021-11-15

## 2021-05-07 RX ORDER — SITAGLIPTIN AND METFORMIN HYDROCHLORIDE 100; 1000 MG/1; MG/1
TABLET, FILM COATED, EXTENDED RELEASE ORAL
Qty: 30 TABLET | Refills: 5 | Status: SHIPPED | OUTPATIENT
Start: 2021-05-07 | End: 2021-11-15

## 2021-07-08 ENCOUNTER — HOSPITAL ENCOUNTER (OUTPATIENT)
Age: 36
Setting detail: SPECIMEN
Discharge: HOME | End: 2021-07-08
Payer: COMMERCIAL

## 2021-07-08 ENCOUNTER — OFFICE VISIT (OUTPATIENT)
Dept: FAMILY MEDICINE CLINIC | Age: 36
End: 2021-07-08
Payer: COMMERCIAL

## 2021-07-08 VITALS
WEIGHT: 293 LBS | BODY MASS INDEX: 50.02 KG/M2 | RESPIRATION RATE: 16 BRPM | HEART RATE: 86 BPM | DIASTOLIC BLOOD PRESSURE: 78 MMHG | HEIGHT: 64 IN | TEMPERATURE: 97.1 F | OXYGEN SATURATION: 97 % | SYSTOLIC BLOOD PRESSURE: 128 MMHG

## 2021-07-08 DIAGNOSIS — G47.33 OBSTRUCTIVE SLEEP APNEA SYNDROME: ICD-10-CM

## 2021-07-08 DIAGNOSIS — I10 ESSENTIAL HYPERTENSION: ICD-10-CM

## 2021-07-08 DIAGNOSIS — E55.9 VITAMIN D DEFICIENCY: ICD-10-CM

## 2021-07-08 DIAGNOSIS — E66.01 MORBID OBESITY DUE TO EXCESS CALORIES (HCC): ICD-10-CM

## 2021-07-08 DIAGNOSIS — E78.2 MIXED HYPERLIPIDEMIA: ICD-10-CM

## 2021-07-08 DIAGNOSIS — Z79.4 CONTROLLED TYPE 2 DIABETES MELLITUS WITHOUT COMPLICATION, WITH LONG-TERM CURRENT USE OF INSULIN (HCC): Primary | ICD-10-CM

## 2021-07-08 DIAGNOSIS — Z11.59 NEED FOR HEPATITIS B SCREENING TEST: ICD-10-CM

## 2021-07-08 DIAGNOSIS — E11.9 CONTROLLED TYPE 2 DIABETES MELLITUS WITHOUT COMPLICATION, WITH LONG-TERM CURRENT USE OF INSULIN (HCC): Primary | ICD-10-CM

## 2021-07-08 LAB — HBA1C MFR BLD: 6 %

## 2021-07-08 PROCEDURE — 36415 COLL VENOUS BLD VENIPUNCTURE: CPT | Performed by: FAMILY MEDICINE

## 2021-07-08 PROCEDURE — 83036 HEMOGLOBIN GLYCOSYLATED A1C: CPT | Performed by: FAMILY MEDICINE

## 2021-07-08 PROCEDURE — 99214 OFFICE O/P EST MOD 30 MIN: CPT | Performed by: FAMILY MEDICINE

## 2021-07-08 SDOH — ECONOMIC STABILITY: FOOD INSECURITY: WITHIN THE PAST 12 MONTHS, YOU WORRIED THAT YOUR FOOD WOULD RUN OUT BEFORE YOU GOT MONEY TO BUY MORE.: NEVER TRUE

## 2021-07-08 SDOH — ECONOMIC STABILITY: TRANSPORTATION INSECURITY
IN THE PAST 12 MONTHS, HAS LACK OF TRANSPORTATION KEPT YOU FROM MEETINGS, WORK, OR FROM GETTING THINGS NEEDED FOR DAILY LIVING?: NO

## 2021-07-08 SDOH — ECONOMIC STABILITY: FOOD INSECURITY: WITHIN THE PAST 12 MONTHS, THE FOOD YOU BOUGHT JUST DIDN'T LAST AND YOU DIDN'T HAVE MONEY TO GET MORE.: NEVER TRUE

## 2021-07-08 ASSESSMENT — ENCOUNTER SYMPTOMS
ALLERGIC/IMMUNOLOGIC NEGATIVE: 1
EYES NEGATIVE: 1
COUGH: 0
SHORTNESS OF BREATH: 0
CONSTIPATION: 0
APNEA: 1
BLOOD IN STOOL: 0
ABDOMINAL PAIN: 0
DIARRHEA: 0

## 2021-07-08 ASSESSMENT — PATIENT HEALTH QUESTIONNAIRE - PHQ9
SUM OF ALL RESPONSES TO PHQ QUESTIONS 1-9: 0
SUM OF ALL RESPONSES TO PHQ QUESTIONS 1-9: 0
SUM OF ALL RESPONSES TO PHQ9 QUESTIONS 1 & 2: 0
2. FEELING DOWN, DEPRESSED OR HOPELESS: 0
SUM OF ALL RESPONSES TO PHQ QUESTIONS 1-9: 0
1. LITTLE INTEREST OR PLEASURE IN DOING THINGS: 0

## 2021-07-08 ASSESSMENT — SOCIAL DETERMINANTS OF HEALTH (SDOH): HOW HARD IS IT FOR YOU TO PAY FOR THE VERY BASICS LIKE FOOD, HOUSING, MEDICAL CARE, AND HEATING?: NOT HARD AT ALL

## 2021-07-08 NOTE — PROGRESS NOTES
[facility]  [facility]  [address]  [facility] [address]  Dept: [phone]    7/8/2021    CHIEF COMPLAINT    Chief Complaint   Patient presents with    Diabetes    Fatigue    Orders     Hep B titers       HPI    Neisha Burns is a 31 y.o. female who presents   Chief Complaint   Patient presents with    Diabetes    Fatigue    Orders     Hep B titers   . Recheck chronic conditions including diabetes, hypertension, hyperlipidemia, obesity, depression, fatigue and vitamin D deficiency. Taking medications as prescribed with no side effects. Has been losing weight with improving diet. Eating keto snacks. Diabetes  She presents for her follow-up diabetic visit. She has type 2 diabetes mellitus. Her disease course has been improving. Hypoglycemia symptoms include hunger and sweats. Pertinent negatives for hypoglycemia include no dizziness, headaches or nervousness/anxiousness. Associated symptoms include fatigue. Pertinent negatives for diabetes include no chest pain. There are no hypoglycemic complications. Symptoms are stable. There are no diabetic complications. Risk factors for coronary artery disease include hypertension, obesity, dyslipidemia and diabetes mellitus. Current diabetic treatment includes insulin injections and oral agent (dual therapy). She is compliant with treatment most of the time. Her weight is decreasing steadily. She is following a generally healthy diet. Meal planning includes avoidance of concentrated sweets. She has not had a previous visit with a dietitian. She participates in exercise intermittently. An ACE inhibitor/angiotensin II receptor blocker is being taken. Fatigue  This is a chronic problem. The current episode started more than 1 year ago. The problem has been waxing and waning. Associated symptoms include fatigue. Pertinent negatives include no abdominal pain, chest pain, coughing, fever or headaches. Treatments tried: CPAP for JUSTYNA. The treatment provided mild relief. Vitals:    07/08/21 0804   BP: 128/78   Site: Left Upper Arm   Position: Sitting   Cuff Size: Large Adult   Pulse: 86   Resp: 16   Temp: 97.1 °F (36.2 °C)   TempSrc: Temporal   SpO2: 97%   Weight: (!) 302 lb (137 kg)   Height: 5' 4\" (1.626 m)       REVIEW OF SYSTEMS    Review of Systems   Constitutional: Positive for fatigue. Negative for fever and unexpected weight change. HENT: Negative. Eyes: Negative. Respiratory: Positive for apnea. Negative for cough and shortness of breath. Using CPAP. Needs new pulmonologist   Cardiovascular: Negative for chest pain and leg swelling. Gastrointestinal: Negative for abdominal pain, blood in stool, constipation and diarrhea. Endocrine: Negative. Genitourinary: Negative for frequency and urgency. Musculoskeletal: Negative. Skin: Negative. Allergic/Immunologic: Negative. Neurological: Negative for dizziness and headaches. Hematological: Negative. Psychiatric/Behavioral: Negative for sleep disturbance. The patient is not nervous/anxious.          Mood stable on medication       PAST MEDICAL HISTORY    Past Medical History:   Diagnosis Date    Anxiety and depression     Depression     Migraines     Obesity     PONV (postoperative nausea and vomiting)     Seasonal allergies     Sleep apnea     uses cpap nightly    Type 2 diabetes mellitus without complication (HCC)        FAMILY HISTORY    Family History   Problem Relation Age of Onset    High Blood Pressure Mother     Breast Cancer Paternal Aunt     Heart Attack Maternal Grandfather     High Blood Pressure Maternal Grandfather     Stroke Maternal Grandfather     Diabetes Paternal Uncle     Cancer Maternal Aunt     Breast Cancer Maternal Aunt        SOCIAL HISTORY    Social History     Socioeconomic History    Marital status:      Spouse name: None    Number of children: 2    Years of education: None    Highest education level: None   Occupational History    Occupation: supervisor    Occupation: manager     Comment: [employer] center   Tobacco Use    Smoking status: Former Smoker     Packs/day: 0.50     Years: 10.00     Pack years: 5.00     Types: Cigarettes    Smokeless tobacco: Never Used    Tobacco comment: quit smoking 4 yrs ago   Vaping Use    Vaping Use: Former   Substance and Sexual Activity    Alcohol use: Yes     Comment: social    Drug use: No    Sexual activity: Yes     Partners: Male   Other Topics Concern    None   Social History Narrative    None     Social Determinants of Health     Financial Resource Strain: Low Risk     Difficulty of Paying Living Expenses: Not hard at all   Food Insecurity: No Food Insecurity    Worried About Running Out of Food in the Last Year: Never true    Jane of Food in the Last Year: Never true   Transportation Needs: No Transportation Needs    Lack of Transportation (Medical): No    Lack of Transportation (Non-Medical):  No   Physical Activity:     Days of Exercise per Week:     Minutes of Exercise per Session:    Stress:     Feeling of Stress :    Social Connections:     Frequency of Communication with Friends and Family:     Frequency of Social Gatherings with Friends and Family:     Attends [employer] Services:     Active Member of Clubs or Organizations:     Attends Club or Organization Meetings:     Marital Status:    Intimate Partner Violence:     Fear of Current or Ex-Partner:     Emotionally Abused:     Physically Abused:     Sexually Abused:        SURGICAL HISTORY    Past Surgical History:   Procedure Laterality Date     SECTION      LAPAROSCOPY  2018    LAPAROSCOPY DIAGNOSTIC (N/A Abdomen), Lysis of adhesions    [place] LAP,DIAGNOSTIC ABDOMEN N/A 2018    LAPAROSCOPY DIAGNOSTIC performed by Vivian Nobles MD at [address] [place] [address] age 7yrs       CURRENT MEDICATIONS Current Outpatient Medications   Medication Sig Dispense Refill    TRESIBA FLEXTOUCH 100 UNIT/ML SOPN INJECT 60 UNITS UNDER THE SKIN DAILY (Patient taking differently: Inject 70 units under the skin daily) 15 pen 5    atorvastatin (LIPITOR) 20 MG tablet TAKE ONE TABLET BY MOUTH DAILY 30 tablet 5    JARDIANCE 10 MG tablet TAKE ONE TABLET BY MOUTH DAILY 30 tablet 5    JANUMET -1000 MG TB24 TAKE ONE TABLET BY MOUTH DAILY 30 tablet 5    buPROPion (WELLBUTRIN XL) 300 MG extended release tablet TAKE ONE TABLET BY MOUTH DAILY 30 tablet 5    [employer] PEN NEEDLES 31G X 6 MM MISC USE ONCE DAILY WITH TRESIBA 100 each 4    Cholecalciferol (VITAMIN D) 125 MCG (5000 UT) CAPS Take 1 capsule by mouth daily 90 capsule 3    lisinopril (PRINIVIL;ZESTRIL) 5 MG tablet TAKE ONE TABLET BY MOUTH DAILY 30 tablet 5    naproxen (NAPROSYN) 500 MG tablet TAKE ONE TABLET BY MOUTH TWICE A DAY WITH FOOD 60 tablet 3    glucose blood VI test strips (TRUETEST TEST) strip 1 each by In Vitro route 3 times daily As needed. 100 each 1    Cetirizine HCl (ZYRTEC PO) Take by mouth daily       No current facility-administered medications for this visit. ALLERGIES    Allergies   Allergen Reactions    Penicillins Anaphylaxis       PHYSICAL EXAM   Physical Exam  Vitals reviewed. Constitutional:       Appearance: She is well-developed. She is obese. HENT:      Head: Normocephalic. Eyes:      Conjunctiva/sclera: Conjunctivae normal.      Pupils: Pupils are equal, round, and reactive to light. Neck:      Thyroid: No thyromegaly. Cardiovascular:      Rate and Rhythm: Normal rate and regular rhythm. Heart sounds: Normal heart sounds. No murmur heard. Pulmonary:      Effort: Pulmonary effort is normal.      Breath sounds: Normal breath sounds. No wheezing or rales. Abdominal:      Palpations: Abdomen is soft. Tenderness: There is no abdominal tenderness. There is no guarding or rebound.       Comments: Obese

## 2021-07-09 DIAGNOSIS — E55.9 VITAMIN D DEFICIENCY: ICD-10-CM

## 2021-07-09 DIAGNOSIS — Z11.59 NEED FOR HEPATITIS B SCREENING TEST: ICD-10-CM

## 2021-07-09 LAB
HBV SURFACE AB TITR SER: 155.6 MIU/ML
VITAMIN D 25-HYDROXY: 29.7 NG/ML (ref 30–100)

## 2021-07-17 DIAGNOSIS — M79.641 BILATERAL HAND PAIN: ICD-10-CM

## 2021-07-17 DIAGNOSIS — E11.9 CONTROLLED TYPE 2 DIABETES MELLITUS WITHOUT COMPLICATION, WITHOUT LONG-TERM CURRENT USE OF INSULIN (HCC): ICD-10-CM

## 2021-07-17 DIAGNOSIS — M79.642 BILATERAL HAND PAIN: ICD-10-CM

## 2021-07-20 RX ORDER — LISINOPRIL 5 MG/1
TABLET ORAL
Qty: 30 TABLET | Refills: 3 | Status: SHIPPED | OUTPATIENT
Start: 2021-07-20 | End: 2021-11-15

## 2021-07-20 RX ORDER — NAPROXEN 500 MG/1
TABLET ORAL
Qty: 60 TABLET | Refills: 0 | Status: SHIPPED | OUTPATIENT
Start: 2021-07-20 | End: 2021-09-20

## 2021-07-27 ENCOUNTER — TELEPHONE (OUTPATIENT)
Dept: FAMILY MEDICINE CLINIC | Age: 36
End: 2021-07-27

## 2021-07-27 NOTE — TELEPHONE ENCOUNTER
Patient given the name & number for Pulmonary: Dr. Natasha Coleman [phone]  The office has faxed over the referral and patient will sign a release of records from her old Pulmonary Dr.    She will be calling back to get the Dr's phone number.

## 2021-07-27 NOTE — TELEPHONE ENCOUNTER
----- Message from Deirdre Alejandre sent at 7/27/2021  3:42 PM EDT -----  Subject: Referral Request    QUESTIONS   Reason for referral request? Pulmonologist   Has the physician seen you for this condition before? No   Preferred Specialist (if applicable)? Do you already have an appointment scheduled? No  Additional Information for Provider? Patient was seen a few weeks ago, Dr. Lawanda Sterling recommended a new pulmonologist specializing in sleep disorders and   said she would put in the referral but it was not placed. Please call back   once completed. ---------------------------------------------------------------------------  --------------  [place] INFO  What is the best way for the office to contact you? OK to leave message on   voicemail  Preferred Call Back Phone Number?  [phone]

## 2021-08-26 ENCOUNTER — OFFICE VISIT (OUTPATIENT)
Dept: PODIATRY | Age: 36
End: 2021-08-26
Payer: COMMERCIAL

## 2021-08-26 VITALS — BODY MASS INDEX: 51.91 KG/M2 | WEIGHT: 293 LBS | HEIGHT: 63 IN

## 2021-08-26 DIAGNOSIS — L03.031 CELLULITIS OF GREAT TOE OF RIGHT FOOT: ICD-10-CM

## 2021-08-26 DIAGNOSIS — B35.1 DERMATOPHYTOSIS OF NAIL: ICD-10-CM

## 2021-08-26 DIAGNOSIS — M79.672 PAIN IN BOTH FEET: ICD-10-CM

## 2021-08-26 DIAGNOSIS — M79.671 PAIN IN RIGHT FOOT: ICD-10-CM

## 2021-08-26 DIAGNOSIS — L60.0 INGROWING NAIL: ICD-10-CM

## 2021-08-26 DIAGNOSIS — E11.51 TYPE II DIABETES MELLITUS WITH PERIPHERAL CIRCULATORY DISORDER (HCC): Primary | ICD-10-CM

## 2021-08-26 DIAGNOSIS — M79.671 PAIN IN BOTH FEET: ICD-10-CM

## 2021-08-26 PROCEDURE — 11721 DEBRIDE NAIL 6 OR MORE: CPT | Performed by: PODIATRIST

## 2021-08-26 PROCEDURE — 99213 OFFICE O/P EST LOW 20 MIN: CPT | Performed by: PODIATRIST

## 2021-08-26 NOTE — PATIENT INSTRUCTIONS
Schedule a Vaccine  When you qualify to receive the vaccine, call the [facility]) COVID-19 Vaccination Hotline to schedule your appointment or to get additional information about the [facility]) locations which are offering the COVID-19 vaccine. To be 94% effective, it's important that you receive two doses of one of the COVID-19 vaccines. -If you are receiving the Haynes Peter vaccine, your second shot will be scheduled as close to 21 days after the first shot as possible. -If you are receiving the Moderna vaccine, your second shot will be scheduled as close to 28 days after the first shot as possible. [facility]) COVID-19 Vaccination Hotline: [phone]    Links to [facility]) website and [facility] website:    [email or web address]    [email or web address]

## 2021-08-26 NOTE — PROGRESS NOTES
[facility] PHYSICIANS  [facility]  [address] [place] [address]  Dept: [phone]  Dept Fax: [phone]    DIABETIC PROGRESS NOTE  Date of patient's visit: 8/26/2021  Patient's Name:  Zak Alarcon YOB: 1985            Patient Care Team:  Elisabeth Garcia MD as PCP - Marimar Young MD as PCP - [facility] Empaneled Provider  Phoenix Ramos DPM as Physician (Podiatry)          Chief Complaint   Patient presents with    Diabetes     [facility] [place]     Peripheral Neuropathy    Foot Pain     right foot    Foot Swelling     right foot       Subjective:   Zak Alarcon comes to clinic for Diabetes [facility] [place] ), Peripheral Neuropathy, Foot Pain (right foot), and Foot Swelling (right foot)    she is a diabetic and states that needs toenails trimmed. Pt currently has complaint of thickened, elongated nails that they cannot manage by themselves. Pt's primary care physician is Elisabeth Garcia MD last seen  7/8/21  Pt's last blood sugar was 6.8 - 2/2021 . Pt has a new complaint of right foot swelling and pain. Pt states noticed 2 - 3 days ago. She has done a lot of walking lately and has noticed pain      Lab Results   Component Value Date    LABA1C 6.0 07/08/2021      Complains of numbness in the feet bilat.   Past Medical History:   Diagnosis Date    Anxiety and depression     Depression     Migraines     Obesity     PONV (postoperative nausea and vomiting)     Seasonal allergies     Sleep apnea     uses cpap nightly    Type 2 diabetes mellitus without complication (HCC)        Allergies   Allergen Reactions    Penicillins Anaphylaxis     Current Outpatient Medications on File Prior to Visit   Medication Sig Dispense Refill    lisinopril (PRINIVIL;ZESTRIL) 5 MG tablet TAKE ONE TABLET BY MOUTH DAILY 30 tablet 3    naproxen (NAPROSYN) 500 MG tablet TAKE ONE TABLET BY MOUTH TWICE A DAY WITH FOOD 60 tablet 0    TRESIBA FLEXTOUCH 100 UNIT/ML SOPN INJECT 60 UNITS UNDER THE SKIN DAILY (Patient taking differently: Inject 70 units under the skin daily) 15 pen 5    atorvastatin (LIPITOR) 20 MG tablet TAKE ONE TABLET BY MOUTH DAILY 30 tablet 5    JARDIANCE 10 MG tablet TAKE ONE TABLET BY MOUTH DAILY 30 tablet 5    JANUMET -1000 MG TB24 TAKE ONE TABLET BY MOUTH DAILY 30 tablet 5    buPROPion (WELLBUTRIN XL) 300 MG extended release tablet TAKE ONE TABLET BY MOUTH DAILY 30 tablet 5    KROGER PEN NEEDLES 31G X 6 MM MISC USE ONCE DAILY WITH TRESIBA 100 each 4    Cholecalciferol (VITAMIN D) 125 MCG (5000 UT) CAPS Take 1 capsule by mouth daily 90 capsule 3    glucose blood VI test strips (TRUETEST TEST) strip 1 each by In Vitro route 3 times daily As needed. 100 each 1    Cetirizine HCl (ZYRTEC PO) Take by mouth daily       No current facility-administered medications on file prior to visit. Review of Systems    Review of Systems:   History obtained from chart review and the patient  General ROS: negative for - chills, fatigue, fever, night sweats or weight gain  Constitutional: Negative for chills, diaphoresis, fatigue, fever and unexpected weight change. Musculoskeletal: Positive for arthralgias, gait problem and joint swelling. Neurological ROS: negative for - behavioral changes, confusion, headaches or seizures. Negative for weakness and numbness. Dermatological ROS: negative for - mole changes, rash  Cardiovascular: Negative for leg swelling. Gastrointestinal: Negative for constipation, diarrhea, nausea and vomiting. Objective:  Dermatologic Exam:  Skin lesion/ulceration Absent . Skin No rashes or nodules noted. .   Skin is thin, with flaky sloughing skin as well as decreased hair growth to the lower leg  Small red hemosiderin deposits seen dorsal foot   Musculoskeletal:     1st MPJ ROM decreased, Bilateral.  Muscle strength 5/5, Bilateral.   POP to the right foot at the dorsal 2nd metatarsal neck  No ecchymosis seen but pitting edema noted     Pain present upon palpation of toenails 1-5, Bilateral. decreased medial longitudinal arch, Bilateral.  Ankle ROM decreased,Bilateral.    Dorsally contracted digits present digits 2, Bilateral.     Vascular: DP pulses 1/4 bilateral.  PT pulses 0/4 bilateral.   CFT <5 seconds, Bilateral.  Hair growth absent to the level of the digits, Bilateral.  Edema present, Bilateral.  Varicosities absent, Bilateral. Erythema absent, Bilateral    Neurological: Sensation diminshed to light touch to level of digits, Bilateral.  Protective sensation intact 6/10 sites via 5.07/10g Duchesne-Meenu Monofilament, Bilateral.  negative Tinel's, Bilateral.  negative Valleix sign, Bilateral.      Integument: Warm, dry, supple, Bilateral.  Open lesion absent, Bilateral.  Interdigital maceration absent to web spaces 4, Bilateral.  Nails 1-5 left and 1-5 right thickened > 3.0 mm, dystrophic and crumbly, discolored with subungual debris. Fissures absent, Bilateral.   General: AAO x 3 in NAD.     Derm  Toenail Description  Sites of Onychomycosis Involvement (Check affected area)  [x] [x] [x] [x] [x] [x] [x] [x] [x] [x]  5 4 3 2 1 1 2 3 4 5                          Right                                        Left    Thickness  [x] [x] [x] [x] [x] [x] [x] [x] [x] [x]  5 4 3 2 1 1 2 3 4 5                         Right                                        Left    Dystrophic Changes   [x] [x] [x] [x] [x] [x] [x] [x] [x] [x]  5 4 3 2 1 1 2 3 4 5                         Right                                        Left    Color   [x] [x] [x] [x] [x] [x] [x] [x] [x] [x]  5 4 3 2 1 1 2 3 4 5                          Right                                        Left    Incurvation/Ingrowin   [] [] [] [] [] [] [] [] [] []  5 4 3 2 1 1 2 3 4 5                         Right                                        Left    Inflammation/Pain   [x] [x] [x] [x] [x] [x] [x] [x] [x] [x]  5 4 3 2 1 1 2 3 4 5                         Right Left        Visual inspection:  Deformity: hammertoe deformity colin feet  amputation: absent  Skin lesions: absent  Edema: right- 2+ pitting edema, left- 2+ pitting edema    Sensory exam:  Monofilament sensation: abnormal - 6/10 via SW 5.07/10g monofilament to the plantar foot bilateral feet    Pulses: abnormal - 1/4 dorsalis pedis pulse and 1/4 Posterior tibial pulse,   Pinprick: Impaired  Proprioception: Impaired  Vibration (128 Hz): Impaired       DM with PVD       [x]Yes    []No      X rays right foot 3 views. No fracture or dislocation seen. No acute findings. No stress fracture     Assessment:  31 y.o. female with:   Diagnosis Orders   1. Type II diabetes mellitus with peripheral circulatory disorder (HCC)  08376 - [place] DEBRIDEMENT OF NAILS, 6 OR MORE    HM DIABETES FOOT EXAM   2. Pain in both feet  78854 - [place] DEBRIDEMENT OF NAILS, 6 OR MORE    HM DIABETES FOOT EXAM   3. Ingrowing nail  20513 - [place] DEBRIDEMENT OF NAILS, 6 OR MORE    HM DIABETES FOOT EXAM   4. Pain in right foot  03951 - [place] DEBRIDEMENT OF NAILS, 6 OR MORE    HM DIABETES FOOT EXAM   5. Cellulitis of great toe of right foot  75256 - [place] DEBRIDEMENT OF NAILS, 6 OR MORE    HM DIABETES FOOT EXAM   6. Dermatophytosis of nail  38591 - [place] DEBRIDEMENT OF NAILS, 6 OR MORE    HM DIABETES FOOT EXAM           Q7   []Yes    []No                Q8   [x]Yes    []No                     Q9   []Yes    []No    Plan:   Pt was evaluated and examined. Patient was given personalized discharge instructions. For the right foot pain and swelling pt is to take ibuprofen 800mg BID  X rays are negative for any stress fracture       Nails 1-10 were debrided sharply in length and thickness with a nipper and , without incident. Pt will follow up in 9 weeks or sooner if any problems arise. Diagnosis was discussed with the pt and all of their questions were answered in detail. Proper foot hygiene and care was discussed with the pt.  Informed patient on proper diabetic foot care and importance of tight glycemic control. Patient to check feet daily and contact the office with any questions/problems/concerns. Other comorbidity noted and will be managed by PCP. All labs were reviewed and all imagining including the above findings were reviewed PRIOR to the patients arrival and with the patient today. Previous patient encounter was reviewed. Encounters from the patients other medical providers were reviewed and noted. Time was spent educating the patient and their families/caregivers on proper care of the feet and ankles. All the above diagnosis were addressed at todays visit and all questions were answered.   A total of 25 minutes was spent with this patients encounter which included charting after the patients visit    8/26/2021    Electronically signed by Chuck Bose DPM on 8/26/2021 at 3:08 PM  8/26/2021

## 2021-09-16 ENCOUNTER — OFFICE VISIT (OUTPATIENT)
Dept: PODIATRY | Age: 36
End: 2021-09-16
Payer: COMMERCIAL

## 2021-09-16 VITALS — HEIGHT: 63 IN | WEIGHT: 293 LBS | BODY MASS INDEX: 51.91 KG/M2

## 2021-09-16 DIAGNOSIS — E11.51 TYPE II DIABETES MELLITUS WITH PERIPHERAL CIRCULATORY DISORDER (HCC): Primary | ICD-10-CM

## 2021-09-16 DIAGNOSIS — M76.821 POSTERIOR TIBIAL TENDON DYSFUNCTION (PTTD) OF RIGHT LOWER EXTREMITY: ICD-10-CM

## 2021-09-16 DIAGNOSIS — M79.671 RIGHT FOOT PAIN: ICD-10-CM

## 2021-09-16 PROCEDURE — 99213 OFFICE O/P EST LOW 20 MIN: CPT | Performed by: PODIATRIST

## 2021-09-16 NOTE — PROGRESS NOTES
[facility] PHYSICIANS  [facility]  [address] [place] [address]  Dept: [phone]  Dept Fax: [phone]    RETURN PATIENT PROGRESS NOTE  Date of patient's visit: 9/16/2021  Patient's Name:  Kala Adams YOB: 1985            Patient Care Team:  Rita Saravia MD as PCP - Beatrice Okeefe MD as PCP - [facility][1] Empaneled Provider  Marilee Paz DPM as Physician (Podiatry)       Kala Adams 31 y.o. female that presents for follow-up of   Chief Complaint   Patient presents with    Foot Pain     rght foot       Symptoms began several month(s) ago and are decreased . Patient relates pain is Absent . Pain is rated 0 out of 10 and is described as none. Treatments prior to today's visit include: previous tapping and podiatry treatment. Currently denies F/C/N/V. Allergies   Allergen Reactions    Penicillins Anaphylaxis       Past Medical History:   Diagnosis Date    Anxiety and depression     Depression     Migraines     Obesity     PONV (postoperative nausea and vomiting)     Seasonal allergies     Sleep apnea     uses cpap nightly    Type 2 diabetes mellitus without complication ([facility] [address]        Prior to Admission medications    Medication Sig Start Date End Date Taking?  Authorizing Provider   lisinopril (PRINIVIL;ZESTRIL) 5 MG tablet TAKE ONE TABLET BY MOUTH DAILY 7/20/21  Yes Rita Saravia MD   naproxen (NAPROSYN) 500 MG tablet TAKE ONE TABLET BY MOUTH TWICE A DAY WITH FOOD 7/20/21  Yes Rita Saravia MD   TRESIBA FLEXTOUCH 100 UNIT/ML SOPN INJECT 60 UNITS UNDER THE SKIN DAILY  Patient taking differently: Inject 70 units under the skin daily 5/28/21  Yes Rita Saravia MD   atorvastatin (LIPITOR) 20 MG tablet TAKE ONE TABLET BY MOUTH DAILY 5/28/21  Yes Rita Saravia MD   JARDIANCE 10 MG tablet TAKE ONE TABLET BY MOUTH DAILY 5/28/21  Yes Rita Saravia MD   JANUMET -1000 MG TB24 TAKE ONE TABLET BY MOUTH DAILY 5/7/21  Yes Rad Dixon MD   buPROPion (WELLBUTRIN XL) 300 MG extended release tablet TAKE ONE TABLET BY MOUTH DAILY 5/7/21  Yes Rad Dixon MD   KROGER PEN NEEDLES 31G X 6 MM MISC USE ONCE DAILY WITH TRESIBA 4/12/21  Yes Rad Dixon MD   Cholecalciferol (VITAMIN D) 125 MCG (5000 UT) CAPS Take 1 capsule by mouth daily 2/11/21  Yes Rad Dixon MD   glucose blood VI test strips (TRUETEST TEST) strip 1 each by In Vitro route 3 times daily As needed. 5/10/18  Yes Rad Dixon MD   Cetirizine HCl (ZYRTEC PO) Take by mouth daily   Yes Historical Provider, MD       Review of Systems    Review of Systems:  History obtained from chart review and the patient  General ROS: negative for - chills, fatigue, fever, night sweats or weight gain  Constitutional: Negative for chills, diaphoresis, fatigue, fever and unexpected weight change. Musculoskeletal: Positive for arthralgias, gait problem and joint swelling. Neurological ROS: negative for - behavioral changes, confusion, headaches or seizures. Negative for weakness and numbness. Dermatological ROS: negative for - mole changes, rash  Cardiovascular: Negative for leg swelling. Gastrointestinal: Negative for constipation, diarrhea, nausea and vomiting. Lower Extremity Physical Examination:     Vitals: There were no vitals filed for this visit. General: AAO x 3 in NAD. Dermatologic Exam:  Skin lesion/ulceration Absent . Skin No rashes or nodules noted. .       Musculoskeletal:     1st MPJ ROM decreased, Bilateral.  Muscle strength 5/5, Bilateral.  Pain present upon palpation of right ankle and foot at the navicular tuberosity to the medial malleolus . Medial longitudinal arch, Bilateral WNL.   Ankle ROM WNL,Bilateral.    Dorsally contracted digits absent digits 1-5 Bilateral.     Vascular: DP and PT pulses palpable 2/4, Bilateral.  CFT <3 seconds, Bilateral.  Hair growth present to the level of the digits, Bilateral.  Edema absent, Bilateral.  Varicosities absent, Bilateral. Erythema absent, Bilateral    Neurological: Sensation intact to light touch to level of digits, Bilateral.  Protective sensation intact 10/10 sites via 5.07/10g Dyer-Meenu Monofilament, Bilateral.  negative Tinel's, Bilateral.  negative Valleix sign, Bilateral.      Integument: Warm, dry, supple, Bilateral.  Open lesion absent, Bilateral.  Interdigital maceration absent to web spaces 1-4, Bilateral.  Nails are normal in length, thickness and color 1-5 bilateral.  Fissures absent, Bilateral.     Asessment: Patient is a 31 y.o. female with:    Diagnosis Orders   1. Type II diabetes mellitus with peripheral circulatory disorder (HCC)     2. Right foot pain     3. Posterior tibial tendon dysfunction (PTTD) of right lower extremity           Plan: Patient examined and evaluated. Current condition and treatment options discussed in detail. Advised pt to her conditon. Non-Weight Bearing Dorsiflexion     Ankle dorsiflexion is the motion of bending your ankle up towards your shin. Gaining this motion can help you regain the ability to walk normally again. Here's how to get more ankle dorsiflexion:   1. Moving only your ankle, point your foot back toward your nose (while keeping knees straight). Continue until you feel discomfort or can't tilt it back any further. 2. Hold this position for 15 seconds. 3. Return to neutral position and repeat 5 times. Non-Weight Bearing Plantar Flexion     Plantar flexion is the motion of pointing your ankle down and away from you. Here is how to gain ankle plantarflexion range of motion (ROM):   1. Moving only your ankle, point your foot forward (while keeping knees straight). Continue until you feel discomfort or can't move it any further. 2. Hold this position for 15 seconds. 3. Return to neutral position. Non-Weight Bearing Inversion     Inversion refers to the motion of pointing your ankle inwards towards the midline of your body.  Here is how you gain foot against a table leg or closed door. 2. Push inward with your foot into the object your foot is against (your ankle joint should not move) causing a contraction of your muscles. 3. Hold this muscle contraction for 15 seconds. 4. Relax for 10 seconds. Resisted Strengthening Dorsiflexion     Resisted strengthening exercises should be performed with a Theraband providing resistance to your movements. These exercises will also work to strengthen the muscles around your ankle. This will provide added support to the joint. Perform each exercise 10 to 15 times in a row. Never tie a Theraband (or anything else) around your foot, ankle, or leg in a way that would restrict blood flow. Ankle dorsiflexion with resistance helps to strengthen your anterior tibialis muscle. Here is how you do it:   1. Moving only your ankle, point your foot back toward your nose (while keeping knees straight). Continue until you feel discomfort or can't tilt it back any further. 2. Hold this position for 2 seconds and slowly release. 3. Return to the neutral position, and then repeat the exercise. Resisted Strengthening Plantar Flexion     Resisted ankle plantar flexion helps to strengthen your calf muscles and Achilles tendon. To do the exercise:   1. Moving only your ankle, point your foot forward (while keeping knees straight). You may feel tightness in your calf muscle behind your lower leg. Continue until you feel discomfort or can't move it any further. 2. Hold this position for 2 seconds. 3. Return to neutral position. Resisted Strengthening Inversion     This exercise will provide strengthening as well:   1. Moving only your ankle and keeping your toes pointed up, turn your foot inward, so the sole is facing your other leg. Continue until either discomfort is felt or you can no longer turn your foot inward. 2. Hold this position for 2 seconds. 3. Return to neutral position.   Resisted Strengthening Eversion     Now strengthen in the other direction:   1. Moving only your ankle and keeping your toes pointed up, turn your foot outward, away from your other leg. Continue until either discomfort is felt or you can no longer turn your foot outward. 2. Hold this position for 2 seconds. 3. Return to neutral position. Partial Weight-Bearing Seated Calf Raises     These partial weight-bearing exercises will help put more weight on the injured ankle as well as strengthen the muscles around it. Each one should be performed 10 times in a row:   1. Sit in a chair with the injured foot on the floor. 2. Lift your heel as far as possible while keeping your toes on the floor. 3. Return heel to the floor. Partial Weight-Bearing Standing Weight Shift     Sometimes after injury, your doctor will have you limit the amount of weight you can put through your lower extremity. This can help protect it as things are healing. As you heal, your PT may guide you in increasing weight bearing through your injured ankle. Weight shifts are the perfect exercise to do for this. To do the exercise:   1. Stand upright while holding onto a stable object. 2. Shift some of your weight onto the injured foot. 3. Hold the position for 15 seconds. 4. Relax and put your weight back onto your uninjured foot. Full Weight-Bearing Single Leg Stance     These exercises will help put more weight on the injured foot. You should be sure that your ankle can tolerate the pressure that you are putting upon it. Perform each one 10 times in a row:   1. Stand on the injured foot while lifting the uninjured foot off the ground. 2. Hold the position for 15 seconds. 3. Relax and put your weight back onto your uninjured foot. Checking in with your PT may be necessary to be sure you are doing the right exercises for your ankle. Full Weight-Bearing Standing Calf Raises     Once you are cleared for full weight bearing, you may do these calf raises:   1.  Stand on the injured foot while lifting the uninjured foot off the ground. 2. Raise up, standing only on the ball of the injured foot and lifting your heel off the ground. 3. Hold the position for 15 seconds. 4. Relax and put your weight back onto your uninjured foot. Full Weight-Bearing Lateral Stepping     Increase the speed of this exercise as your healing progresses:   1. Place a rolled towel or short object on the ground to the side of your injured foot. 2. Step over the towel with the injured foot and remain on that foot. 3. Then bring the uninjured foot over the object and stand on both feet. 4. Step back over the towel with the uninjured foot and remain on that foot. 5. Then bring the injured foot back over the towel and stand on both feet. Full Weight-Bearing Lateral Jump     This exercise starts to incorporate plyometrics into your rehab routine, which can help you get back to running and sports. Increase the speed of this exercise as your healing progresses:   1. Place a rolled towel or short object on the ground to the side of your injured foot. 2. Hop over the towel and land on the injured foot. 3. Then hop back over the towel and land on the uninjured foot. Single Leg Stance on a Towel     Injury to ankles can often result in decreased balance ability. 2? Towards the end of rehabilitation, performing balance activities is an important way to prevent future injury. Perform this exercise 10 times in a row:   1. Fold a towel into a small rectangle and place on the ground. 2. Stand with the injured foot on the towel. 3. Lift the uninjured leg off the ground standing only on the towel with the injured leg. 4. Hold for 15 seconds. (As balance improves, increase stance time on injured leg up to 45 seconds.)  5. Return your uninjured foot to the floor. You can increase the challenge by standing on more unsteady surfaces like a BOSU or wobble board.  Your PT may also have you use a [employer] board while working on balance exercises. .  Verbal and written instructions given to patient. Contact office with any questions/problems/concerns. No orders of the defined types were placed in this encounter. No orders of the defined types were placed in this encounter. RTC in 2month(s).     9/16/2021      Electronically signed by Rebecca Rick DPM on 9/16/2021 at 8:59 AM  9/16/2021

## 2021-09-16 NOTE — PATIENT INSTRUCTIONS
Schedule a Vaccine  When you qualify to receive the vaccine, call the [facility]) COVID-19 Vaccination Hotline to schedule your appointment or to get additional information about the [facility]) locations which are offering the COVID-19 vaccine. To be 94% effective, it's important that you receive two doses of one of the COVID-19 vaccines. -If you are receiving the Haynes Peter vaccine, your second shot will be scheduled as close to 21 days after the first shot as possible. -If you are receiving the Moderna vaccine, your second shot will be scheduled as close to 28 days after the first shot as possible. [facility]) COVID-19 Vaccination Hotline: [phone]    Links to [facility]) website and [facility] website:    [email or web address]    [email or web address]

## 2021-09-19 DIAGNOSIS — M79.641 BILATERAL HAND PAIN: ICD-10-CM

## 2021-09-19 DIAGNOSIS — M79.642 BILATERAL HAND PAIN: ICD-10-CM

## 2021-09-20 RX ORDER — NAPROXEN 500 MG/1
TABLET ORAL
Qty: 60 TABLET | Refills: 5 | Status: SHIPPED | OUTPATIENT
Start: 2021-09-20 | End: 2022-09-26

## 2021-10-17 DIAGNOSIS — Z79.4 CONTROLLED TYPE 2 DIABETES MELLITUS WITHOUT COMPLICATION, WITH LONG-TERM CURRENT USE OF INSULIN (HCC): ICD-10-CM

## 2021-10-17 DIAGNOSIS — E11.9 CONTROLLED TYPE 2 DIABETES MELLITUS WITHOUT COMPLICATION, WITH LONG-TERM CURRENT USE OF INSULIN (HCC): ICD-10-CM

## 2021-10-18 RX ORDER — INSULIN DEGLUDEC INJECTION 100 U/ML
INJECTION, SOLUTION SUBCUTANEOUS
Qty: 15 PEN | Refills: 3 | Status: SHIPPED | OUTPATIENT
Start: 2021-10-18 | End: 2022-01-17

## 2021-11-15 DIAGNOSIS — F33.42 RECURRENT MAJOR DEPRESSIVE DISORDER, IN FULL REMISSION (HCC): ICD-10-CM

## 2021-11-15 DIAGNOSIS — E11.9 CONTROLLED TYPE 2 DIABETES MELLITUS WITHOUT COMPLICATION, WITHOUT LONG-TERM CURRENT USE OF INSULIN (HCC): ICD-10-CM

## 2021-11-15 RX ORDER — LISINOPRIL 5 MG/1
TABLET ORAL
Qty: 30 TABLET | Refills: 3 | Status: SHIPPED | OUTPATIENT
Start: 2021-11-15 | End: 2022-03-25

## 2021-11-15 RX ORDER — BUPROPION HYDROCHLORIDE 300 MG/1
TABLET ORAL
Qty: 30 TABLET | Refills: 5 | Status: SHIPPED | OUTPATIENT
Start: 2021-11-15 | End: 2022-05-25

## 2021-11-15 RX ORDER — SITAGLIPTIN AND METFORMIN HYDROCHLORIDE 100; 1000 MG/1; MG/1
TABLET, FILM COATED, EXTENDED RELEASE ORAL
Qty: 30 TABLET | Refills: 5 | Status: SHIPPED | OUTPATIENT
Start: 2021-11-15 | End: 2022-05-25

## 2021-12-02 ENCOUNTER — OFFICE VISIT (OUTPATIENT)
Dept: FAMILY MEDICINE CLINIC | Age: 36
End: 2021-12-02
Payer: COMMERCIAL

## 2021-12-02 VITALS
SYSTOLIC BLOOD PRESSURE: 120 MMHG | OXYGEN SATURATION: 98 % | HEIGHT: 64 IN | HEART RATE: 84 BPM | WEIGHT: 293 LBS | BODY MASS INDEX: 50.02 KG/M2 | TEMPERATURE: 97.2 F | DIASTOLIC BLOOD PRESSURE: 76 MMHG

## 2021-12-02 DIAGNOSIS — E11.9 CONTROLLED TYPE 2 DIABETES MELLITUS WITHOUT COMPLICATION, WITH LONG-TERM CURRENT USE OF INSULIN (HCC): Primary | ICD-10-CM

## 2021-12-02 DIAGNOSIS — G47.33 OBSTRUCTIVE SLEEP APNEA SYNDROME: ICD-10-CM

## 2021-12-02 DIAGNOSIS — Z79.4 CONTROLLED TYPE 2 DIABETES MELLITUS WITHOUT COMPLICATION, WITH LONG-TERM CURRENT USE OF INSULIN (HCC): Primary | ICD-10-CM

## 2021-12-02 DIAGNOSIS — E66.01 MORBID OBESITY DUE TO EXCESS CALORIES (HCC): ICD-10-CM

## 2021-12-02 DIAGNOSIS — M25.551 HIP PAIN, RIGHT: ICD-10-CM

## 2021-12-02 DIAGNOSIS — Z23 FLU VACCINE NEED: ICD-10-CM

## 2021-12-02 DIAGNOSIS — F34.1 DYSTHYMIA: ICD-10-CM

## 2021-12-02 LAB
CREATININE URINE POCT: ABNORMAL
HBA1C MFR BLD: 6.2 %
MICROALBUMIN/CREAT 24H UR: ABNORMAL MG/G{CREAT}
MICROALBUMIN/CREAT UR-RTO: ABNORMAL

## 2021-12-02 PROCEDURE — 99214 OFFICE O/P EST MOD 30 MIN: CPT | Performed by: FAMILY MEDICINE

## 2021-12-02 PROCEDURE — 90471 IMMUNIZATION ADMIN: CPT | Performed by: FAMILY MEDICINE

## 2021-12-02 PROCEDURE — 83036 HEMOGLOBIN GLYCOSYLATED A1C: CPT | Performed by: FAMILY MEDICINE

## 2021-12-02 PROCEDURE — 82044 UR ALBUMIN SEMIQUANTITATIVE: CPT | Performed by: FAMILY MEDICINE

## 2021-12-02 PROCEDURE — 90674 CCIIV4 VAC NO PRSV 0.5 ML IM: CPT | Performed by: FAMILY MEDICINE

## 2021-12-02 ASSESSMENT — ENCOUNTER SYMPTOMS
SHORTNESS OF BREATH: 0
ALLERGIC/IMMUNOLOGIC NEGATIVE: 1
DIARRHEA: 0
COUGH: 0
ABDOMINAL PAIN: 0
EYES NEGATIVE: 1
CONSTIPATION: 0

## 2021-12-02 NOTE — PROGRESS NOTES
[facility]  [facility]  [address]  [address] [place] [address]  Dept: [phone]    12/2/2021    CHIEF COMPLAINT    Chief Complaint   Patient presents with    Diabetes       HPI    Jaylen Martinez is a 31 y.o. female who presents   Chief Complaint   Patient presents with    Diabetes   . Recheck chronic conditions including diabetes, depression, high cholesterol, vitamin d deficiency and rt hip pain. Not testing bs regularly. Would like to get continuous monitor. Vitals:    12/02/21 0955   BP: 120/76   Pulse: 84   Temp: 97.2 °F (36.2 °C)   SpO2: 98%   Weight: (!) 303 lb 6.4 oz (137.6 kg)   Height: 5' 4\" (1.626 m)       REVIEW OF SYSTEMS    Review of Systems   Constitutional: Negative for fatigue, fever and unexpected weight change. HENT: Negative. Eyes: Negative. Respiratory: Negative for cough and shortness of breath. Cardiovascular: Negative for chest pain, palpitations and leg swelling. Gastrointestinal: Negative for abdominal pain, constipation and diarrhea. Endocrine: Negative. Genitourinary: Positive for vaginal discharge. Negative for frequency and urgency. Itching and discharge on jardiance   Musculoskeletal: Positive for arthralgias. Back pain: rt hip pain, worsening. Skin: Negative. Allergic/Immunologic: Negative. Neurological: Negative for dizziness and headaches. Hematological: Negative. Psychiatric/Behavioral: Negative for sleep disturbance. The patient is not nervous/anxious.          Mood stable on med       PAST MEDICAL HISTORY    Past Medical History:   Diagnosis Date    Anxiety and depression     Depression     Migraines     Obesity     PONV (postoperative nausea and vomiting)     Seasonal allergies     Sleep apnea     uses cpap nightly    Type 2 diabetes mellitus without complication (HCC)        FAMILY HISTORY    Family History   Problem Relation Age of Onset    High Blood Pressure Mother     Breast Cancer Paternal Aunt     Heart Attack Maternal Grandfather     High Blood Pressure Maternal Grandfather     Stroke Maternal Grandfather     Diabetes Paternal Uncle     Cancer Maternal Aunt     Breast Cancer Maternal Aunt        SOCIAL HISTORY    Social History     Socioeconomic History    Marital status:      Spouse name: None    Number of children: 2    Years of education: None    Highest education level: None   Occupational History    Occupation: supervisor    Occupation: manager     Comment: [employer] center   Tobacco Use    Smoking status: Former Smoker     Packs/day: 0.50     Years: 10.00     Pack years: 5.00     Types: Cigarettes    Smokeless tobacco: Never Used    Tobacco comment: quit smoking 4 yrs ago   Vaping Use    Vaping Use: Former   Substance and Sexual Activity    Alcohol use: Yes     Comment: social    Drug use: No    Sexual activity: Yes     Partners: Male   Other Topics Concern    None   Social History Narrative    None     Social Determinants of Health     Financial Resource Strain: Low Risk     Difficulty of Paying Living Expenses: Not hard at all   Food Insecurity: No Food Insecurity    Worried About Running Out of Food in the Last Year: Never true    Jane of Food in the Last Year: Never true   Transportation Needs: No Transportation Needs    Lack of Transportation (Medical): No    Lack of Transportation (Non-Medical):  No   Physical Activity:     Days of Exercise per Week: Not on file    Minutes of Exercise per Session: Not on file   Stress:     Feeling of Stress : Not on file   Social Connections:     Frequency of Communication with Friends and Family: Not on file    Frequency of Social Gatherings with Friends and Family: Not on file    Attends Confucianist Services: Not on file    Active Member of Clubs or Organizations: Not on file    Attends Club or Organization Meetings: Not on file    Marital Status: Not on file   Intimate Partner Violence:     Fear of Current or Ex-Partner: Not on file    Emotionally Abused: Not on file    Physically Abused: Not on file    Sexually Abused: Not on file   Housing Stability:     Unable to Pay for Housing in the Last Year: Not on file    Number of Places Lived in the Last Year: Not on file    Unstable Housing in the Last Year: Not on file       SURGICAL HISTORY    Past Surgical History:   Procedure Laterality Date     SECTION      LAPAROSCOPY  2018    LAPAROSCOPY DIAGNOSTIC (N/A Abdomen), Lysis of adhesions    IN LAP,DIAGNOSTIC ABDOMEN N/A 2018    LAPAROSCOPY DIAGNOSTIC performed by Sandy Toussaint MD at [address] [facility]      lt wrist age 7yrs       CURRENT MEDICATIONS    Current Outpatient Medications   Medication Sig Dispense Refill    buPROPion (WELLBUTRIN XL) 300 MG extended release tablet TAKE ONE TABLET BY MOUTH DAILY 30 tablet 5    JANUMET -1000 MG TB24 TAKE ONE TABLET BY MOUTH DAILY 30 tablet 5    lisinopril (PRINIVIL;ZESTRIL) 5 MG tablet TAKE ONE TABLET BY MOUTH DAILY 30 tablet 3    TRESIBA FLEXTOUCH 100 UNIT/ML SOPN Inject 70 units under the skin daily 15 pen 3    naproxen (NAPROSYN) 500 MG tablet TAKE ONE TABLET BY MOUTH TWICE A DAY WITH FOOD 60 tablet 5    atorvastatin (LIPITOR) 20 MG tablet TAKE ONE TABLET BY MOUTH DAILY 30 tablet 5    [employer] PEN NEEDLES 31G X 6 MM MISC USE ONCE DAILY WITH TRESIBA 100 each 4    Cholecalciferol (VITAMIN D) 125 MCG (5000 UT) CAPS Take 1 capsule by mouth daily 90 capsule 3    Cetirizine HCl (ZYRTEC PO) Take by mouth daily      glucose blood VI test strips (TRUETEST TEST) strip 1 each by In Vitro route 3 times daily As needed. (Patient not taking: Reported on 2021) 100 each 1     No current facility-administered medications for this visit. ALLERGIES    Allergies   Allergen Reactions    Penicillins Anaphylaxis       PHYSICAL EXAM   Physical Exam  Vitals reviewed.    Constitutional: Appearance: She is well-developed. She is obese. HENT:      Head: Normocephalic. Eyes:      Conjunctiva/sclera: Conjunctivae normal.      Pupils: Pupils are equal, round, and reactive to light. Neck:      Thyroid: No thyromegaly. Cardiovascular:      Rate and Rhythm: Normal rate and regular rhythm. Heart sounds: Normal heart sounds. No murmur heard. Pulmonary:      Effort: Pulmonary effort is normal.      Breath sounds: Normal breath sounds. No wheezing or rales. Abdominal:      Palpations: Abdomen is soft. Tenderness: There is no abdominal tenderness. There is no guarding or rebound. Musculoskeletal:         General: No tenderness or deformity. Normal range of motion. Cervical back: Normal range of motion and neck supple. Lymphadenopathy:      Cervical: No cervical adenopathy. Skin:     General: Skin is warm and dry. Neurological:      Mental Status: She is alert and oriented to person, place, and time. Psychiatric:         Mood and Affect: Mood normal.         Behavior: Behavior normal.         Thought Content: Thought content normal.         Judgment: Judgment normal.         ASSESSMENT/PLAN  1. Controlled type 2 diabetes mellitus without complication, with long-term current use of insulin ([address]  Results for orders placed or performed in visit on 12/02/21   POCT glycosylated hemoglobin (Hb A1C)   Result Value Ref Range    Hemoglobin A1C 6.2 %   POCT microalbumin   Result Value Ref Range    Microalb, Ur 30 mg/L     Creatinine Ur POCT 50 mg/dL     Microalbumin Creatinine Ratio  mg/g      A1c in good range. Patient encouraged to test 4 times daily to help monitor food intake. We will also try to get a continuous blood sugar monitor ordered  - POCT glycosylated hemoglobin (Hb A1C)    2. Morbid obesity due to excess calories ([address]  Encouraged regular activity and trying to reduce carbs and calories    3.  Obstructive sleep apnea syndrome  Continue using CPAP for stable condition    4. Hip pain, right  X-ray of hip and referral to Ortho if indicated  - XR HIP RIGHT (2-3 VIEWS); Future    5. Dysthymia  Stable on current medication       Lanie Reyes received counseling on the following healthy behaviors: nutrition, exercise and medication adherence  Reviewed prior labs and health maintenance. Continue current medications, diet and exercise. Discussed use, benefit, and side effects of prescribed medications. Barriers to medication compliance addressed. Patient given educational materials - see patient instructions. All patient questions answered. Patient voiced understanding. Return in about 4 months (around 4/2/2022) for diabetes.         Electronically signed by Madelin Ponce MD on 12/2/21 at 10:01 AM EST

## 2021-12-03 ENCOUNTER — TELEPHONE (OUTPATIENT)
Dept: FAMILY MEDICINE CLINIC | Age: 36
End: 2021-12-03

## 2021-12-03 DIAGNOSIS — E11.9 CONTROLLED TYPE 2 DIABETES MELLITUS WITHOUT COMPLICATION, WITHOUT LONG-TERM CURRENT USE OF INSULIN (HCC): Primary | ICD-10-CM

## 2021-12-03 RX ORDER — BLOOD-GLUCOSE,RECEIVER,CONT
1 EACH MISCELLANEOUS CONTINUOUS
Qty: 1 EACH | Refills: 1 | Status: SHIPPED | OUTPATIENT
Start: 2021-12-03

## 2021-12-03 RX ORDER — BLOOD-GLUCOSE SENSOR
1 EACH MISCELLANEOUS CONTINUOUS
Qty: 2 EACH | Refills: 0 | Status: SHIPPED | OUTPATIENT
Start: 2021-12-03 | End: 2022-01-28

## 2021-12-03 RX ORDER — BLOOD-GLUCOSE TRANSMITTER
1 EACH MISCELLANEOUS CONTINUOUS
Qty: 2 EACH | Refills: 1 | Status: SHIPPED | OUTPATIENT
Start: 2021-12-03

## 2021-12-05 DIAGNOSIS — E78.2 MIXED HYPERLIPIDEMIA: ICD-10-CM

## 2021-12-06 RX ORDER — ATORVASTATIN CALCIUM 20 MG/1
TABLET, FILM COATED ORAL
Qty: 30 TABLET | Refills: 5 | Status: SHIPPED | OUTPATIENT
Start: 2021-12-06 | End: 2022-05-25

## 2021-12-06 NOTE — TELEPHONE ENCOUNTER
Pharm requested    Health Maintenance   Topic Date Due    COVID-19 Vaccine (1) Never done    Pneumococcal 0-64 years Vaccine (1 of 2 - PPSV23) 11/03/2017    Diabetic retinal exam  01/14/2020    Cervical cancer screen  07/12/2021    Lipid screen  08/25/2021    Varicella vaccine (1 of 2 - 2-dose childhood series) 09/18/2035 (Originally 6/6/1988)    Hepatitis B vaccine (1 of 3 - Risk 3-dose series) 08/14/2036 (Originally 6/6/2006)    Potassium monitoring  02/09/2022    Creatinine monitoring  02/09/2022    Diabetic foot exam  08/26/2022    A1C test (Diabetic or Prediabetic)  12/02/2022    Diabetic microalbuminuria test  12/02/2022    DTaP/Tdap/Td vaccine (3 - Td or Tdap) 10/31/2024    Flu vaccine  Completed    Hepatitis C screen  Completed    HIV screen  Completed    Hepatitis A vaccine  Aged Out    Hib vaccine  Aged Out    Meningococcal (ACWY) vaccine  Aged Out             (applicable per patient's age: Cancer Screenings, Depression Screening, Fall Risk Screening, Immunizations)    Hemoglobin A1C (%)   Date Value   12/02/2021 6.2   07/08/2021 6.0   02/09/2021 6.8     LDL Cholesterol (mg/dL)   Date Value   08/25/2020 53     LDL Calculated (mg/dL)   Date Value   01/09/2018 138 (A)     AST (U/L)   Date Value   02/09/2021 16     ALT (U/L)   Date Value   02/09/2021 23     BUN (mg/dL)   Date Value   02/09/2021 19      (goal A1C is < 7)   (goal LDL is <100) need 30-50% reduction from baseline     BP Readings from Last 3 Encounters:   12/02/21 120/76   07/08/21 128/78   02/09/21 128/76    (goal /80)      All Future Testing planned in [facility]:  Lab Frequency Next Occurrence   XR HIP RIGHT (2-3 VIEWS) Once 12/02/2021       Next Visit Date:  Future Appointments   Date Time Provider Roni Cordero   4/5/2022  8:00 AM [facility]            Patient Active Problem List:     Morbid obesity due to excess calories ([address]     Recurrent major depressive disorder, in full remission ([facility]     Controlled type 2 diabetes mellitus, without long-term current use of insulin (HCC)     Depression     Migraines     Seasonal allergies     Obstructive sleep apnea syndrome     S/P laparoscopy

## 2022-01-16 DIAGNOSIS — Z79.4 CONTROLLED TYPE 2 DIABETES MELLITUS WITHOUT COMPLICATION, WITH LONG-TERM CURRENT USE OF INSULIN (HCC): ICD-10-CM

## 2022-01-16 DIAGNOSIS — E11.9 CONTROLLED TYPE 2 DIABETES MELLITUS WITHOUT COMPLICATION, WITH LONG-TERM CURRENT USE OF INSULIN (HCC): ICD-10-CM

## 2022-01-17 RX ORDER — INSULIN DEGLUDEC INJECTION 100 U/ML
INJECTION, SOLUTION SUBCUTANEOUS
Qty: 15 PEN | Refills: 3 | Status: SHIPPED | OUTPATIENT
Start: 2022-01-17 | End: 2022-02-11 | Stop reason: SDUPTHER

## 2022-01-17 NOTE — TELEPHONE ENCOUNTER
Pharm requested    Health Maintenance   Topic Date Due    COVID-19 Vaccine (1) Never done    Pneumococcal 0-64 years Vaccine (1 of 2 - PPSV23) 11/03/2017    Diabetic retinal exam  01/14/2020    Cervical cancer screen  07/12/2021    Lipid screen  08/25/2021    Varicella vaccine (1 of 2 - 2-dose childhood series) 09/18/2035 (Originally [birth year]/6/1988)    Hepatitis B vaccine (1 of 3 - Risk 3-dose series) 08/14/2036 (Originally 6/6/2006)    Potassium monitoring  02/09/2022    Creatinine monitoring  02/09/2022    Depression Monitoring  07/08/2022    Diabetic foot exam  08/26/2022    A1C test (Diabetic or Prediabetic)  12/02/2022    Diabetic microalbuminuria test  12/02/2022    DTaP/Tdap/Td vaccine (3 - Td or Tdap) 10/31/2024    Flu vaccine  Completed    Hepatitis C screen  Completed    HIV screen  Completed    Hepatitis A vaccine  Aged Out    Hib vaccine  Aged Out    Meningococcal (ACWY) vaccine  Aged Out             (applicable per patient's age: Cancer Screenings, Depression Screening, Fall Risk Screening, Immunizations)    Hemoglobin A1C (%)   Date Value   12/02/2021 6.2   07/08/2021 6.0   02/09/2021 6.8     LDL Cholesterol (mg/dL)   Date Value   08/25/2020 53     LDL Calculated (mg/dL)   Date Value   01/09/2018 138 (A)     AST (U/L)   Date Value   02/09/2021 16     ALT (U/L)   Date Value   02/09/2021 23     BUN (mg/dL)   Date Value   02/09/2021 19      (goal A1C is < 7)   (goal LDL is <100) need 30-50% reduction from baseline     BP Readings from Last 3 Encounters:   12/02/21 120/76   07/08/21 128/78   02/09/21 128/76    (goal /80)      All Future Testing planned in [facility]:  Lab Frequency Next Occurrence   XR HIP RIGHT (2-3 VIEWS) Once 01/17/2022       Next Visit Date:  Future Appointments   Date Time Provider Roni Cordero   4/5/2022  8:00 AM [facility]            Patient Active Problem List:     Morbid obesity due to excess calories ([address]     Recurrent major depressive disorder, in full remission ([facility]     Controlled type 2 diabetes mellitus, without long-term current use of insulin (HCC)     Depression     Migraines     Seasonal allergies     Obstructive sleep apnea syndrome     S/P laparoscopy

## 2022-01-27 DIAGNOSIS — E55.9 VITAMIN D DEFICIENCY: ICD-10-CM

## 2022-01-27 DIAGNOSIS — E11.9 CONTROLLED TYPE 2 DIABETES MELLITUS WITHOUT COMPLICATION, WITHOUT LONG-TERM CURRENT USE OF INSULIN (HCC): ICD-10-CM

## 2022-01-28 RX ORDER — BLOOD-GLUCOSE SENSOR
EACH MISCELLANEOUS
Qty: 2 EACH | Refills: 1 | Status: SHIPPED | OUTPATIENT
Start: 2022-01-28 | End: 2022-07-14

## 2022-02-11 ENCOUNTER — NURSE TRIAGE (OUTPATIENT)
Dept: OTHER | Facility: CLINIC | Age: 37
End: 2022-02-11

## 2022-02-11 ENCOUNTER — TELEMEDICINE (OUTPATIENT)
Dept: FAMILY MEDICINE CLINIC | Age: 37
End: 2022-02-11
Payer: COMMERCIAL

## 2022-02-11 DIAGNOSIS — Z79.4 CONTROLLED TYPE 2 DIABETES MELLITUS WITHOUT COMPLICATION, WITH LONG-TERM CURRENT USE OF INSULIN (HCC): ICD-10-CM

## 2022-02-11 DIAGNOSIS — R42 VERTIGO: Primary | ICD-10-CM

## 2022-02-11 DIAGNOSIS — H83.03 INFECTION OF THE INNER EAR, BILATERAL: ICD-10-CM

## 2022-02-11 DIAGNOSIS — E11.9 CONTROLLED TYPE 2 DIABETES MELLITUS WITHOUT COMPLICATION, WITH LONG-TERM CURRENT USE OF INSULIN (HCC): ICD-10-CM

## 2022-02-11 PROCEDURE — 99214 OFFICE O/P EST MOD 30 MIN: CPT | Performed by: FAMILY MEDICINE

## 2022-02-11 RX ORDER — MECLIZINE HCL 12.5 MG/1
25 TABLET ORAL 3 TIMES DAILY PRN
Qty: 30 TABLET | Refills: 2 | Status: SHIPPED | OUTPATIENT
Start: 2022-02-11 | End: 2022-02-26

## 2022-02-11 RX ORDER — DOXYCYCLINE HYCLATE 100 MG
100 TABLET ORAL 2 TIMES DAILY
Qty: 14 TABLET | Refills: 0 | Status: SHIPPED | OUTPATIENT
Start: 2022-02-11 | End: 2022-04-22 | Stop reason: SDUPTHER

## 2022-02-11 RX ORDER — INSULIN DEGLUDEC INJECTION 100 U/ML
70 INJECTION, SOLUTION SUBCUTANEOUS DAILY
Qty: 15 PEN | Refills: 3
Start: 2022-02-11 | End: 2022-04-22

## 2022-02-11 ASSESSMENT — ENCOUNTER SYMPTOMS
COUGH: 0
ABDOMINAL PAIN: 0
EYES NEGATIVE: 1
ALLERGIC/IMMUNOLOGIC NEGATIVE: 1
SHORTNESS OF BREATH: 0

## 2022-02-11 NOTE — TELEPHONE ENCOUNTER
Received call from [facility] with [employer] Complaint. Subjective: Caller states \"dizzy and lightheaded\"     Current Symptoms: Dizzy and lightheaded started about 10 days ago. Having vertigo like sx with room spinning and feeling nauseated. Believes that she probably had covid at the first of January. Onset: 10 days ago; rapid, unchanged    Associated Symptoms: NA    Pain Severity: 0/10; ;     Temperature: denies     What has been tried: Increased fluid intake    LMP: 1 week Pregnant: No    Recommended disposition: pcp or ucc in 24 hours    Care advice provided, patient verbalizes understanding; denies any other questions or concerns; instructed to call back for any new or worsening symptoms. Writer provided warm transfer to [facility] for appointment scheduling     Attention Provider: Thank you for allowing me to participate in the care of your patient. The patient was connected to triage in response to information provided to the ECC/PSC. Please do not respond through this encounter as the response is not directed to a shared pool.             Reason for Disposition   [1] MODERATE dizziness (e.g., vertigo; feels very unsteady, interferes with normal activities) AND [2] has NOT been evaluated by physician for this    Protocols used: DIZZINESS - VERTIGO-ADULT-

## 2022-02-11 NOTE — PROGRESS NOTES
[facility] [address] [facility] [address]  Dept: [phone]    2022    TELEHEALTH EVALUATION -- Audio/Visual (During XKPOT-74 public health emergency)  Michelle Stubbs (:  1985) has requested an audio/video evaluation for the following concern(s):    HPI:  Video visit to discuss sx of dizziness, started a week ago. Worse when standing, llying down. Sx got worse for a few days then improved gradually. Now is only dizzy when rolling over or when getting up. Has to move slowly. Denies ear pain, headache or weakness. bs has been well controlled. Gets some low readings, 72, once was 66. Gets occasional high alerts of 250 including last night. She thinks she had covid in early January with sx of headache, fatigue, cough, congestion.  also had sx. They could not find a place to get tested. She has not had booster shot yet. There were no vitals filed for this visit. REVIEW OF SYSTEMS:   Review of Systems   Constitutional: Negative for fatigue, fever and unexpected weight change. HENT: Negative. Eyes: Negative. Respiratory: Negative for cough and shortness of breath. Cardiovascular: Negative for chest pain and leg swelling. Gastrointestinal: Negative for abdominal pain. Endocrine: Negative. Genitourinary: Negative for frequency and urgency. Musculoskeletal: Negative. Skin: Negative. Allergic/Immunologic: Negative. Neurological: Positive for dizziness. Negative for headaches. Hematological: Negative. Psychiatric/Behavioral: Negative for sleep disturbance. The patient is not nervous/anxious.         PAST MEDICAL HISTORY:    Past Medical History:   Diagnosis Date    Anxiety and depression     Depression     Migraines     Obesity     PONV (postoperative nausea and vomiting)     Seasonal allergies     Sleep apnea     uses cpap nightly    Type 2 diabetes mellitus without complication ([address]        FAMILY HISTORY:    Family History   Problem Relation Age of Onset    High Blood Pressure Mother     Breast Cancer Paternal Aunt     Heart Attack Maternal Grandfather     High Blood Pressure Maternal Grandfather     Stroke Maternal Grandfather     Diabetes Paternal Uncle     Cancer Maternal Aunt     Breast Cancer Maternal Aunt        SOCIAL HISTORY:    Social History     Socioeconomic History    Marital status:      Spouse name: Not on file    Number of children: 2    Years of education: Not on file    Highest education level: Not on file   Occupational History    Occupation: supervisor    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   Tobacco Use    Smoking status: Former Smoker     Packs/day: 0.50     Years: 10.00     Pack years: 5.00     Types: Cigarettes    Smokeless tobacco: Never Used    Tobacco comment: quit smoking 4 yrs ago   Vaping Use    Vaping Use: Former   Substance and Sexual Activity    Alcohol use: Yes     Comment: social    Drug use: No    Sexual activity: Yes     Partners: Male   Other Topics Concern    Not on file   Social History Narrative    Not on file     Social Determinants of Health     Financial Resource Strain: Low Risk     Difficulty of Paying Living Expenses: Not hard at all   Food Insecurity: No Food Insecurity    Worried About Running Out of Food in the Last Year: Never true    Jane of Food in the Last Year: Never true   Transportation Needs: No Transportation Needs    Lack of Transportation (Medical): No    Lack of Transportation (Non-Medical):  No   Physical Activity:     Days of Exercise per Week: Not on file    Minutes of Exercise per Session: Not on file   Stress:     Feeling of Stress : Not on file   Social Connections:     Frequency of Communication with Friends and Family: Not on file    Frequency of Social Gatherings with Friends and Family: Not on file    Attends Yazidi Services: Not on file   Lala Knight Member of Clubs or Organizations: Not on file    Attends Club or Organization Meetings: Not on file    Marital Status: Not on file   Intimate Partner Violence:     Fear of Current or Ex-Partner: Not on file    Emotionally Abused: Not on file    Physically Abused: Not on file    Sexually Abused: Not on file   Housing Stability:     Unable to Pay for Housing in the Last Year: Not on file    Number of [place] in the Last Year: Not on file    Unstable Housing in the Last Year: Not on file       SURGICAL HISTORY:    Past Surgical History:   Procedure Laterality Date     SECTION      LAPAROSCOPY  2018    LAPAROSCOPY DIAGNOSTIC (N/A Abdomen), Lysis of adhesions    [place] LAP,DIAGNOSTIC ABDOMEN N/A 2018    LAPAROSCOPY DIAGNOSTIC performed by Rafa Grider MD at [address]      lt wrist age 7yrs       CURRENT MEDICATIONS:    Current Outpatient Medications   Medication Sig Dispense Refill    TRESIBA FLEXTOUCH 100 UNIT/ML SOPN Inject 70 Units into the skin daily 15 pen 3    doxycycline hyclate (VIBRA-TABS) 100 MG tablet Take 1 tablet by mouth 2 times daily for 7 days 14 tablet 0    meclizine (ANTIVERT) 12.5 MG tablet Take 2 tablets by mouth 3 times daily as needed for Dizziness 30 tablet 2    Continuous Blood Gluc Sensor (DEXCOM G6 SENSOR) MISC USE AS DIRECTED 2 each 1    Cholecalciferol (VITAMIN D3) 125 MCG (5000 UT) CAPS TAKE ONE CAPSULE BY MOUTH DAILY 90 capsule 3    atorvastatin (LIPITOR) 20 MG tablet TAKE ONE TABLET BY MOUTH DAILY 30 tablet 5    Continuous Blood Gluc Transmit (DEXCOM G6 TRANSMITTER) MISC 1 Device by Does not apply route continuous 2 each 1    Continuous Blood Gluc  (DEXCOM G6 ) SONIA 1 Device by Does not apply route continuous 1 each 1    buPROPion (WELLBUTRIN XL) 300 MG extended release tablet TAKE ONE TABLET BY MOUTH DAILY 30 tablet 5    JANUMET -1000 MG TB24 TAKE ONE TABLET BY MOUTH DAILY 30 tablet 5    into the skin daily  Dispense: 15 pen; Refill: 3      Return if symptoms worsen or fail to improve. Nathanael Aj is a 31 y.o. female being evaluated by a Virtual Visit (video visit) encounter to address concerns as mentioned above. A caregiver was present when appropriate. Due to this being a TeleHealth encounter (During [facility]), evaluation of the following organ systems was limited: Vitals/Constitutional/EENT/Resp/CV/GI//MS/Neuro/Skin/Heme-Lymph-Imm. Pursuant to the emergency declaration under the [address] [place] [address], [address] [place] [address] authority and the [employer] Act, this Virtual Visit was conducted with patient's (and/or legal guardian's) consent, to reduce the patient's risk of exposure to COVID-19 and provide necessary medical care. The patient (and/or legal guardian) has also been advised to contact this office for worsening conditions or problems, and seek emergency medical treatment and/or call 911 if deemed necessary. Services were provided through a video synchronous discussion virtually to substitute for in-person clinic visit. Patient and provider were located at their individual homes. --Bin Yoder MD on 2/11/2022 at 11:34 AM    An electronic signature was used to authenticate this note.

## 2022-02-11 NOTE — PATIENT INSTRUCTIONS
Patient Education        Epley Maneuver at Home for Vertigo: Exercises  Introduction  Vertigo is a spinning or whirling sensation when you move your head. Your doctor may have moved you in different positions to help your vertigo get better faster. This is called the Epley maneuver. Your doctor also may have asked you to do these exercises at home. Do the exercises as often as your doctor recommends. If your vertigo is getting worse, your doctor may have you change the exercise or stop it. Step 1  Step 1    1. Sit on the edge of a bed or sofa. Step 2    1. Turn your head 45 degrees in the direction your doctor told you to. This should be toward the ear that causes the most vertigo for you. In this picture, the woman is turning toward her left ear. Step 3    1. Tilt yourself backward until you are lying on your back. Your head should still be at a 45-degree turn. Your head should be about midway between looking straight ahead and looking out to your side. Hold for 30 seconds. If you have vertigo, stay in this position until it stops. Step 4    1. Turn your head 90 degrees toward the ear that has the least vertigo. In this picture, the woman is turning to the right because she has vertigo on her left side. The point of your chin should be raised and over your shoulder. Hold for 30 seconds. Step 5    1. Roll onto the side with the least vertigo. You should now be looking at the floor. Hold for 30 seconds. Follow-up care is a key part of your treatment and safety. Be sure to make and go to all appointments, and call your doctor if you are having problems. It's also a good idea to know your test results and keep a list of the medicines you take. Where can you learn more? Go to https://chneriseb.Optimizely. org and sign in to your L[employer] account. Enter X565 in the Red Advertising box to learn more about \"Epley Maneuver at Home for Vertigo: Exercises. \"     If you do not have an account, please click on the \"Sign Up Now\" link. Current as of: April 8, 2021               Content Version: 13.1  © 2006-2021 [employer]. Care instructions adapted under license by [facility] ([facility]). If you have questions about a medical condition or this instruction, always ask your healthcare professional. [address] any warranty or liability for your use of this information. Patient Education        Vertigo: Care Instructions  Your Care Instructions     Vertigo is the feeling that you or your surroundings are moving when there is no actual movement. It is often described as a feeling of spinning, whirling, falling, or tilting. Vertigo may make you vomit or feel nauseated. You may have trouble standing or walking and may lose your balance. Vertigo is often related to an inner ear problem, but it can have other more serious causes. If vertigo continues, you may need more tests to find its cause. Follow-up care is a key part of your treatment and safety. Be sure to make and go to all appointments, and call your doctor if you are having problems. It's also a good idea to know your test results and keep a list of the medicines you take. How can you care for yourself at home? · Do not lie flat on your back. Prop yourself up slightly. This may reduce the spinning feeling. Keep your eyes open. · Move slowly so that you do not fall. · If your doctor recommends medicine, take it exactly as directed. · Do not drive while you are having vertigo. Certain exercises, called Puente-Daroff exercises, can help decrease vertigo. To do Puente-Daroff exercises:  · Sit on the edge of a bed or sofa and quickly lie down on the side that causes the worst vertigo. Lie on your side with your ear down. · Stay in this position for at least 30 seconds or until the vertigo goes away. · Sit up. If this causes vertigo, wait for it to stop. · Repeat the procedure on the other side. · Repeat this 10 times. Do these exercises 2 times a day until the vertigo is gone. When should you call for help? Call 911 anytime you think you may need emergency care. For example, call if:    · You passed out (lost consciousness).     · You have symptoms of a stroke. These may include:  ? Sudden numbness, tingling, weakness, or loss of movement in your face, arm, or leg, especially on only one side of your body. ? Sudden vision changes. ? Sudden trouble speaking. ? Sudden confusion or trouble understanding simple statements. ? Sudden problems with walking or balance. ? A sudden, severe headache that is different from past headaches. Call your doctor now or seek immediate medical care if:    · Vertigo occurs with a fever, a headache, or ringing in your ears.     · You have new or increased nausea and vomiting. Watch closely for changes in your health, and be sure to contact your doctor if:    · Vertigo gets worse or happens more often.     · Vertigo has not gotten better after 2 weeks. Where can you learn more? Go to https://Renewable Funding.[employer] org and sign in to your TheGrid account. Enter X435 in the Zalando box to learn more about \"Vertigo: Care Instructions. \"     If you do not have an account, please click on the \"Sign Up Now\" link. Current as of: September 8, 2021               Content Version: 13.1  © 5074-7363 [employer]. Care instructions adapted under license by [facility] ([facility]). If you have questions about a medical condition or this instruction, always ask your healthcare professional. [place] [address] any warranty or liability for your use of this information.

## 2022-03-17 ENCOUNTER — TELEPHONE (OUTPATIENT)
Dept: FAMILY MEDICINE CLINIC | Age: 37
End: 2022-03-17

## 2022-03-17 NOTE — TELEPHONE ENCOUNTER
Called the pt she had an appt on: 4.5.2022 for DM. Has hx of: Dizziness    Last seen on 2.11.2022 via video. Her dizziness is the same, wishes to address the problem. Offered earlier appt with different provider, pt declined. Pt asking to speak to MA from Dr Yon Stinson.

## 2022-03-25 DIAGNOSIS — E11.9 CONTROLLED TYPE 2 DIABETES MELLITUS WITHOUT COMPLICATION, WITHOUT LONG-TERM CURRENT USE OF INSULIN (HCC): ICD-10-CM

## 2022-03-25 RX ORDER — LISINOPRIL 5 MG/1
TABLET ORAL
Qty: 30 TABLET | Refills: 3 | Status: SHIPPED | OUTPATIENT
Start: 2022-03-25 | End: 2022-07-26

## 2022-03-25 NOTE — TELEPHONE ENCOUNTER
Jerry Pacheco is calling to request a refill on the following medication(s):    Last Visit Date (If Applicable):  6/78/5398    Next Visit Date:    4/14/2022    Medication Request:  Requested Prescriptions     Pending Prescriptions Disp Refills    lisinopril (PRINIVIL;ZESTRIL) 5 MG tablet [Pharmacy Med Name: LISINOPRIL 5 MG TABLET] 30 tablet 3     Sig: TAKE ONE TABLET BY MOUTH DAILY

## 2022-04-14 ENCOUNTER — OFFICE VISIT (OUTPATIENT)
Dept: FAMILY MEDICINE CLINIC | Age: 37
End: 2022-04-14
Payer: COMMERCIAL

## 2022-04-14 VITALS
HEART RATE: 97 BPM | HEIGHT: 63 IN | BODY MASS INDEX: 51.91 KG/M2 | DIASTOLIC BLOOD PRESSURE: 76 MMHG | OXYGEN SATURATION: 99 % | SYSTOLIC BLOOD PRESSURE: 118 MMHG | WEIGHT: 293 LBS

## 2022-04-14 DIAGNOSIS — E66.01 MORBID OBESITY DUE TO EXCESS CALORIES (HCC): ICD-10-CM

## 2022-04-14 DIAGNOSIS — E11.9 CONTROLLED TYPE 2 DIABETES MELLITUS WITHOUT COMPLICATION, WITHOUT LONG-TERM CURRENT USE OF INSULIN (HCC): Primary | ICD-10-CM

## 2022-04-14 DIAGNOSIS — E55.9 VITAMIN D DEFICIENCY: ICD-10-CM

## 2022-04-14 DIAGNOSIS — R53.82 CHRONIC FATIGUE: ICD-10-CM

## 2022-04-14 DIAGNOSIS — R42 VERTIGO: ICD-10-CM

## 2022-04-14 DIAGNOSIS — R74.8 ELEVATED LIVER ENZYMES: ICD-10-CM

## 2022-04-14 DIAGNOSIS — F34.1 DYSTHYMIA: ICD-10-CM

## 2022-04-14 DIAGNOSIS — H69.81 EUSTACHIAN TUBE DYSFUNCTION, RIGHT: ICD-10-CM

## 2022-04-14 DIAGNOSIS — I10 ESSENTIAL HYPERTENSION: ICD-10-CM

## 2022-04-14 DIAGNOSIS — E78.2 MIXED HYPERLIPIDEMIA: ICD-10-CM

## 2022-04-14 DIAGNOSIS — G47.33 OBSTRUCTIVE SLEEP APNEA SYNDROME: ICD-10-CM

## 2022-04-14 LAB — HBA1C MFR BLD: 7 %

## 2022-04-14 PROCEDURE — 83036 HEMOGLOBIN GLYCOSYLATED A1C: CPT | Performed by: FAMILY MEDICINE

## 2022-04-14 PROCEDURE — 3051F HG A1C>EQUAL 7.0%<8.0%: CPT | Performed by: FAMILY MEDICINE

## 2022-04-14 PROCEDURE — 99214 OFFICE O/P EST MOD 30 MIN: CPT | Performed by: FAMILY MEDICINE

## 2022-04-14 ASSESSMENT — ENCOUNTER SYMPTOMS
CONSTIPATION: 0
DIARRHEA: 0
SHORTNESS OF BREATH: 0
EYES NEGATIVE: 1
ALLERGIC/IMMUNOLOGIC NEGATIVE: 1
BLOOD IN STOOL: 0
ABDOMINAL PAIN: 0
COUGH: 0

## 2022-04-14 NOTE — PROGRESS NOTES
[place] [id number] [facility] [address]  Dept: [phone]    4/14/2022    CHIEF COMPLAINT    Chief Complaint   Patient presents with    Dizziness       QI Adams is a 31 y.o. female who presents   Chief Complaint   Patient presents with    Dizziness   . Recheck chronic conditions including diabetes, htn, high cholesterol, obesity. Has been having intermittent dizziness, usually when lying down and when she first gets up. Occurring daily. Denies ear sx. Has been having nosebleeds and congestion. Using a humidifier which has helped. Gets headaches every few weeks, not severe. Hx of tingling and numbness in hands, takes naprosyn daily. Not using wrist splints at night. Using ergonomic keyboard. Using dexcom for sugar monitoring. Has had some spikes at night. Has a lot of stress at home. Seeing a therapist regularly. Also in couples therapy. Vitals:    04/14/22 0927   BP: 118/76   Pulse: 97   SpO2: 99%   Weight: (!) 323 lb (146.5 kg)   Height: 5' 3\" (1.6 m)       REVIEW OF SYSTEMS    Review of Systems   Constitutional: Negative for fatigue, fever and unexpected weight change. HENT: Negative. Eyes: Negative. Respiratory: Negative for cough and shortness of breath. Cardiovascular: Negative for chest pain and leg swelling. Gastrointestinal: Negative for abdominal pain, blood in stool, constipation and diarrhea. Endocrine: Negative. Genitourinary: Negative for frequency and urgency. Musculoskeletal: Positive for arthralgias. Skin: Negative. Allergic/Immunologic: Negative. Neurological: Positive for dizziness. Negative for headaches. Hematological: Negative. Psychiatric/Behavioral: Positive for dysphoric mood. Negative for sleep disturbance. The patient is not nervous/anxious.          Recent stress       PAST MEDICAL HISTORY    Past Medical History:   Diagnosis Date    Anxiety and depression     Depression     Migraines     Obesity     PONV (postoperative nausea and vomiting)     Seasonal allergies     Sleep apnea     uses cpap nightly    Type 2 diabetes mellitus without complication ([facility] [address]        FAMILY HISTORY    Family History   Problem Relation Age of Onset    High Blood Pressure Mother     Breast Cancer Paternal Aunt     Heart Attack Maternal Grandfather     High Blood Pressure Maternal Grandfather     Stroke Maternal Grandfather     Diabetes Paternal Uncle     Cancer Maternal Aunt     Breast Cancer Maternal Aunt        SOCIAL HISTORY    Social History     Socioeconomic History    Marital status:      Spouse name: None    Number of children: 2    Years of education: None    Highest education level: None   Occupational History    Occupation:      Employer: [employer]   Tobacco Use    Smoking status: Former Smoker     Packs/day: 0.50     Years: 10.00     Pack years: 5.00     Types: Cigarettes    Smokeless tobacco: Never Used    Tobacco comment: quit smoking 4 yrs ago   Vaping Use    Vaping Use: Former   Substance and Sexual Activity    Alcohol use: Yes     Comment: social    Drug use: No    Sexual activity: Yes     Partners: Male   Other Topics Concern    None   Social History Narrative    None     Social Determinants of Health     Financial Resource Strain: Low Risk     Difficulty of Paying Living Expenses: Not hard at all   Food Insecurity: No Food Insecurity    Worried About Running Out of Food in the Last Year: Never true    Jane of Food in the Last Year: Never true   Transportation Needs: No Transportation Needs    Lack of Transportation (Medical): No    Lack of Transportation (Non-Medical):  No   Physical Activity:     Days of Exercise per Week: Not on file    Minutes of Exercise per Session: Not on file   Stress:     Feeling of Stress : Not on file   Social Connections:     Frequency of Communication with Friends and Family: Not on file    Frequency of Social Gatherings with Friends and Family: Not on file    Attends Congregational Services: Not on file    Active Member of Clubs or Organizations: Not on file    Attends Club or Organization Meetings: Not on file    Marital Status: Not on file   Intimate Partner Violence:     Fear of Current or Ex-Partner: Not on file    Emotionally Abused: Not on file    Physically Abused: Not on file    Sexually Abused: Not on file   Housing Stability:     Unable to Pay for Housing in the Last Year: Not on file    Number of [place] in the Last Year: Not on file    Unstable Housing in the Last Year: Not on file       SURGICAL HISTORY    Past Surgical History:   Procedure Laterality Date     SECTION      LAPAROSCOPY  2018    LAPAROSCOPY DIAGNOSTIC (N/A Abdomen), Lysis of adhesions    [place] LAP,DIAGNOSTIC ABDOMEN N/A 2018    LAPAROSCOPY DIAGNOSTIC performed by Myriam Gaytan MD at [address] [facility]      lt wrist age 7yrs       CURRENT MEDICATIONS    Current Outpatient Medications   Medication Sig Dispense Refill    lisinopril (PRINIVIL;ZESTRIL) 5 MG tablet TAKE ONE TABLET BY MOUTH DAILY 30 tablet 3    TRESIBA FLEXTOUCH 100 UNIT/ML SOPN Inject 70 Units into the skin daily 15 pen 3    Continuous Blood Gluc Sensor (DEXCOM G6 SENSOR) MISC USE AS DIRECTED 2 each 1    Cholecalciferol (VITAMIN D3) 125 MCG (5000 UT) CAPS TAKE ONE CAPSULE BY MOUTH DAILY 90 capsule 3    atorvastatin (LIPITOR) 20 MG tablet TAKE ONE TABLET BY MOUTH DAILY 30 tablet 5    Continuous Blood Gluc Transmit (DEXCOM G6 TRANSMITTER) MISC 1 Device by Does not apply route continuous 2 each 1    Continuous Blood Gluc  (DEXCOM G6 ) SONIA 1 Device by Does not apply route continuous 1 each 1    buPROPion (WELLBUTRIN XL) 300 MG extended release tablet TAKE ONE TABLET BY MOUTH DAILY 30 tablet 5    JANUMET -1000 MG TB24 TAKE ONE TABLET BY MOUTH DAILY 30 tablet 5    naproxen (NAPROSYN) 500 MG tablet TAKE ONE TABLET BY MOUTH TWICE A DAY WITH FOOD 60 tablet 5    KROGER PEN NEEDLES 31G X 6 MM MISC USE ONCE DAILY WITH TRESIBA 100 each 4    Cetirizine HCl (ZYRTEC PO) Take by mouth daily       No current facility-administered medications for this visit. ALLERGIES    Allergies   Allergen Reactions    Penicillins Anaphylaxis       PHYSICAL EXAM   Physical Exam  Vitals reviewed. Constitutional:       Appearance: She is well-developed. She is obese. HENT:      Head: Normocephalic. Ears:        Nose:      Right Sinus: Maxillary sinus tenderness present. Left Sinus: Maxillary sinus tenderness present. Eyes:      Conjunctiva/sclera: Conjunctivae normal.      Pupils: Pupils are equal, round, and reactive to light. Neck:      Thyroid: No thyromegaly. Cardiovascular:      Rate and Rhythm: Normal rate and regular rhythm. Heart sounds: Normal heart sounds. No murmur heard. Pulmonary:      Effort: Pulmonary effort is normal.      Breath sounds: Normal breath sounds. No wheezing or rales. Abdominal:      Palpations: Abdomen is soft. Tenderness: There is no abdominal tenderness. There is no guarding or rebound. Musculoskeletal:         General: No tenderness or deformity. Normal range of motion. Cervical back: Normal range of motion and neck supple. Lymphadenopathy:      Cervical: No cervical adenopathy. Skin:     General: Skin is warm and dry. Neurological:      Mental Status: She is alert and oriented to person, place, and time. Psychiatric:         Mood and Affect: Mood normal.         Behavior: Behavior normal.         Thought Content: Thought content normal.         Judgment: Judgment normal.         ASSESSMENT/PLAN  1.  Controlled type 2 diabetes mellitus without complication, without long-term current use of insulin (HCC)  Chronic, stable, continue current medication and/or plan  Results for orders placed or performed in visit on 04/14/22   POCT glycosylated hemoglobin (Hb A1C)   Result Value Ref Range    Hemoglobin A1C 7.0 %       - Comprehensive Metabolic Panel; Future  - Lipid Panel; Future  - POCT glycosylated hemoglobin (Hb A1C)    2. Vertigo  Discussed possible causes including inner ear disorder or low blood pressure. She is going to stop lisinopril for 2 weeks and see if symptoms improve. If not she will resume lisinopril and we will consider treating for otitis interna. - CBC with Auto Differential; Future    3. Eustachian tube dysfunction, right  Use saline spray frequently throughout the day    4. Vitamin D deficiency  Adjust supplement if indicated  - Vitamin D 25 Hydroxy; Future    5. Mixed hyperlipidemia  Continue statin and healthy diet  - Lipid Panel; Future    6. Morbid obesity due to excess calories ([address]  Continue efforts to follow healthy diet and increase exercise  - TSH with Reflex; Future  - Vitamin D 25 Hydroxy; Future    7. Obstructive sleep apnea syndrome  Continue using CPAP  - CBC with Auto Differential; Future  - TSH with Reflex; Future    8. Essential hypertension  Chronic and stable. - Comprehensive Metabolic Panel; Future    9. Chronic fatigue    - CBC with Auto Differential; Future  - TSH with Reflex; Future  - Vitamin D 25 Hydroxy; Future    10. Elevated liver enzymes    - Comprehensive Metabolic Panel; Future    11. Dysthymia  Chronic, continue current medication and seeing therapist       Naseem Pyle received counseling on the following healthy behaviors: nutrition, exercise and medication adherence  Reviewed prior labs and health maintenance. Continue current medications, diet and exercise. Discussed use, benefit, and side effects of prescribed medications. Barriers to medication compliance addressed. Patient given educational materials - see patient instructions. All patient questions answered. Patient voiced understanding.       Return in about 4 months (around 8/14/2022) for diabetes, htn, gyn exam.        Electronically signed by Elisabeth Garcia MD on 4/14/22 at 9:27 AM EDT

## 2022-04-22 DIAGNOSIS — E11.9 CONTROLLED TYPE 2 DIABETES MELLITUS WITHOUT COMPLICATION, WITH LONG-TERM CURRENT USE OF INSULIN (HCC): ICD-10-CM

## 2022-04-22 DIAGNOSIS — Z79.4 CONTROLLED TYPE 2 DIABETES MELLITUS WITHOUT COMPLICATION, WITH LONG-TERM CURRENT USE OF INSULIN (HCC): ICD-10-CM

## 2022-04-22 RX ORDER — INSULIN DEGLUDEC INJECTION 100 U/ML
INJECTION, SOLUTION SUBCUTANEOUS
Qty: 15 ML | Refills: 5 | Status: SHIPPED | OUTPATIENT
Start: 2022-04-22 | End: 2022-08-15 | Stop reason: SDUPTHER

## 2022-04-22 NOTE — TELEPHONE ENCOUNTER
John Mirza is calling to request a refill on the following medication(s):    Last Visit Date (If Applicable):  8/86/7754    Next Visit Date:    8/15/2022    Medication Request:  Requested Prescriptions     Pending Prescriptions Disp Refills    TRESIBA FLEXTOUCH 100 UNIT/ML SOPN [Pharmacy Med Name: [place] 100 UNIT/ML] 15 mL      Sig: INJECT 70 UNITS UNDER THE SKIN DAILY

## 2022-05-24 DIAGNOSIS — E78.2 MIXED HYPERLIPIDEMIA: ICD-10-CM

## 2022-05-24 DIAGNOSIS — F33.42 RECURRENT MAJOR DEPRESSIVE DISORDER, IN FULL REMISSION (HCC): ICD-10-CM

## 2022-05-25 RX ORDER — SITAGLIPTIN AND METFORMIN HYDROCHLORIDE 100; 1000 MG/1; MG/1
TABLET, FILM COATED, EXTENDED RELEASE ORAL
Qty: 30 TABLET | Refills: 5 | Status: SHIPPED | OUTPATIENT
Start: 2022-05-25

## 2022-05-25 RX ORDER — ATORVASTATIN CALCIUM 20 MG/1
TABLET, FILM COATED ORAL
Qty: 30 TABLET | Refills: 5 | Status: SHIPPED | OUTPATIENT
Start: 2022-05-25

## 2022-05-25 RX ORDER — BUPROPION HYDROCHLORIDE 300 MG/1
TABLET ORAL
Qty: 30 TABLET | Refills: 5 | Status: SHIPPED | OUTPATIENT
Start: 2022-05-25

## 2022-07-13 DIAGNOSIS — E11.9 CONTROLLED TYPE 2 DIABETES MELLITUS WITHOUT COMPLICATION, WITHOUT LONG-TERM CURRENT USE OF INSULIN (HCC): ICD-10-CM

## 2022-07-14 RX ORDER — BLOOD-GLUCOSE SENSOR
EACH MISCELLANEOUS
Qty: 3 EACH | Refills: 2 | Status: SHIPPED | OUTPATIENT
Start: 2022-07-14 | End: 2022-10-10

## 2022-07-14 RX ORDER — PEN NEEDLE, DIABETIC 31 G X1/4"
NEEDLE, DISPOSABLE MISCELLANEOUS
Qty: 100 EACH | Refills: 2 | Status: SHIPPED | OUTPATIENT
Start: 2022-07-14

## 2022-07-24 DIAGNOSIS — E11.9 CONTROLLED TYPE 2 DIABETES MELLITUS WITHOUT COMPLICATION, WITHOUT LONG-TERM CURRENT USE OF INSULIN (HCC): ICD-10-CM

## 2022-07-25 NOTE — TELEPHONE ENCOUNTER
Ghada Mayo is calling to request a refill on the following medication(s):    Last Visit Date (If Applicable):  8/09/2165    Next Visit Date:    8/15/2022    Medication Request:  Requested Prescriptions     Pending Prescriptions Disp Refills    lisinopril (PRINIVIL;ZESTRIL) 5 MG tablet [Pharmacy Med Name: LISINOPRIL 5 MG TABLET] 30 tablet 3     Sig: TAKE ONE TABLET BY MOUTH DAILY

## 2022-07-26 RX ORDER — LISINOPRIL 5 MG/1
TABLET ORAL
Qty: 30 TABLET | Refills: 3 | Status: SHIPPED | OUTPATIENT
Start: 2022-07-26

## 2022-08-15 ENCOUNTER — OFFICE VISIT (OUTPATIENT)
Dept: FAMILY MEDICINE CLINIC | Age: 37
End: 2022-08-15
Payer: COMMERCIAL

## 2022-08-15 ENCOUNTER — HOSPITAL ENCOUNTER (OUTPATIENT)
Age: 37
Setting detail: SPECIMEN
Discharge: HOME | End: 2022-08-15

## 2022-08-15 VITALS
OXYGEN SATURATION: 98 % | HEART RATE: 100 BPM | WEIGHT: 293 LBS | BODY MASS INDEX: 57.93 KG/M2 | DIASTOLIC BLOOD PRESSURE: 80 MMHG | SYSTOLIC BLOOD PRESSURE: 130 MMHG

## 2022-08-15 DIAGNOSIS — R74.8 ELEVATED LIVER ENZYMES: ICD-10-CM

## 2022-08-15 DIAGNOSIS — R42 VERTIGO: ICD-10-CM

## 2022-08-15 DIAGNOSIS — E55.9 VITAMIN D DEFICIENCY: ICD-10-CM

## 2022-08-15 DIAGNOSIS — G47.33 OBSTRUCTIVE SLEEP APNEA SYNDROME: ICD-10-CM

## 2022-08-15 DIAGNOSIS — Z01.419 ENCOUNTER FOR GYNECOLOGICAL EXAMINATION WITHOUT ABNORMAL FINDING: Primary | ICD-10-CM

## 2022-08-15 DIAGNOSIS — I10 ESSENTIAL HYPERTENSION: ICD-10-CM

## 2022-08-15 DIAGNOSIS — Z79.4 CONTROLLED TYPE 2 DIABETES MELLITUS WITHOUT COMPLICATION, WITH LONG-TERM CURRENT USE OF INSULIN (HCC): ICD-10-CM

## 2022-08-15 DIAGNOSIS — E11.9 CONTROLLED TYPE 2 DIABETES MELLITUS WITHOUT COMPLICATION, WITHOUT LONG-TERM CURRENT USE OF INSULIN (HCC): ICD-10-CM

## 2022-08-15 DIAGNOSIS — R53.82 CHRONIC FATIGUE: ICD-10-CM

## 2022-08-15 DIAGNOSIS — E66.01 MORBID OBESITY DUE TO EXCESS CALORIES (HCC): ICD-10-CM

## 2022-08-15 DIAGNOSIS — E11.9 CONTROLLED TYPE 2 DIABETES MELLITUS WITHOUT COMPLICATION, WITH LONG-TERM CURRENT USE OF INSULIN (HCC): ICD-10-CM

## 2022-08-15 DIAGNOSIS — E78.2 MIXED HYPERLIPIDEMIA: ICD-10-CM

## 2022-08-15 LAB
ABSOLUTE EOS #: 0.12 K/UL (ref 0–0.44)
ABSOLUTE IMMATURE GRANULOCYTE: 0.03 K/UL (ref 0–0.3)
ABSOLUTE LYMPH #: 1.57 K/UL (ref 1.1–3.7)
ABSOLUTE MONO #: 0.47 K/UL (ref 0.1–1.2)
ALBUMIN SERPL-MCNC: 4.2 G/DL (ref 3.5–5.2)
ALBUMIN/GLOBULIN RATIO: 1.5 (ref 1–2.5)
ALP BLD-CCNC: 59 U/L (ref 35–104)
ALT SERPL-CCNC: 71 U/L (ref 5–33)
ANION GAP SERPL CALCULATED.3IONS-SCNC: 16 MMOL/L (ref 9–17)
AST SERPL-CCNC: 44 U/L
BASOPHILS # BLD: 1 % (ref 0–2)
BASOPHILS ABSOLUTE: 0.05 K/UL (ref 0–0.2)
BILIRUB SERPL-MCNC: 0.44 MG/DL (ref 0.3–1.2)
BUN BLDV-MCNC: 18 MG/DL (ref 6–20)
CALCIUM SERPL-MCNC: 9.2 MG/DL (ref 8.6–10.4)
CHLORIDE BLD-SCNC: 100 MMOL/L (ref 98–107)
CHOLESTEROL/HDL RATIO: 3.2
CHOLESTEROL: 145 MG/DL
CO2: 22 MMOL/L (ref 20–31)
CREAT SERPL-MCNC: 0.88 MG/DL (ref 0.5–0.9)
EOSINOPHILS RELATIVE PERCENT: 2 % (ref 1–4)
GFR AFRICAN AMERICAN: >60 ML/MIN
GFR NON-AFRICAN AMERICAN: >60 ML/MIN
GFR SERPL CREATININE-BSD FRML MDRD: ABNORMAL ML/MIN/{1.73_M2}
GLUCOSE BLD-MCNC: 192 MG/DL (ref 70–99)
HCT VFR BLD CALC: 41.2 % (ref 36.3–47.1)
HDLC SERPL-MCNC: 45 MG/DL
HEMOGLOBIN: 13.7 G/DL (ref 11.9–15.1)
IMMATURE GRANULOCYTES: 0 %
LDL CHOLESTEROL: 67 MG/DL (ref 0–130)
LYMPHOCYTES # BLD: 20 % (ref 24–43)
MCH RBC QN AUTO: 29.8 PG (ref 25.2–33.5)
MCHC RBC AUTO-ENTMCNC: 33.3 G/DL (ref 28.4–34.8)
MCV RBC AUTO: 89.6 FL (ref 82.6–102.9)
MONOCYTES # BLD: 6 % (ref 3–12)
NRBC AUTOMATED: 0 PER 100 WBC
PDW BLD-RTO: 13.1 % (ref 11.8–14.4)
PLATELET # BLD: ABNORMAL K/UL (ref 138–453)
PLATELET, FLUORESCENCE: 215 K/UL (ref 138–453)
PLATELET, IMMATURE FRACTION: 10.4 % (ref 1.1–10.3)
POTASSIUM SERPL-SCNC: 4.5 MMOL/L (ref 3.7–5.3)
RBC # BLD: 4.6 M/UL (ref 3.95–5.11)
SEG NEUTROPHILS: 72 % (ref 36–65)
SEGMENTED NEUTROPHILS ABSOLUTE COUNT: 5.75 K/UL (ref 1.5–8.1)
SODIUM BLD-SCNC: 138 MMOL/L (ref 135–144)
TOTAL PROTEIN: 7 G/DL (ref 6.4–8.3)
TRIGL SERPL-MCNC: 166 MG/DL
TSH SERPL DL<=0.05 MIU/L-ACNC: 1.19 UIU/ML (ref 0.3–5)
VITAMIN D 25-HYDROXY: 51.8 NG/ML
WBC # BLD: 8 K/UL (ref 3.5–11.3)

## 2022-08-15 PROCEDURE — 99395 PREV VISIT EST AGE 18-39: CPT | Performed by: FAMILY MEDICINE

## 2022-08-15 RX ORDER — INSULIN DEGLUDEC INJECTION 100 U/ML
80 INJECTION, SOLUTION SUBCUTANEOUS
Qty: 15 ML | Refills: 5
Start: 2022-08-15 | End: 2022-09-06

## 2022-08-15 SDOH — ECONOMIC STABILITY: FOOD INSECURITY: WITHIN THE PAST 12 MONTHS, THE FOOD YOU BOUGHT JUST DIDN'T LAST AND YOU DIDN'T HAVE MONEY TO GET MORE.: NEVER TRUE

## 2022-08-15 SDOH — ECONOMIC STABILITY: FOOD INSECURITY: WITHIN THE PAST 12 MONTHS, YOU WORRIED THAT YOUR FOOD WOULD RUN OUT BEFORE YOU GOT MONEY TO BUY MORE.: NEVER TRUE

## 2022-08-15 ASSESSMENT — PATIENT HEALTH QUESTIONNAIRE - PHQ9
10. IF YOU CHECKED OFF ANY PROBLEMS, HOW DIFFICULT HAVE THESE PROBLEMS MADE IT FOR YOU TO DO YOUR WORK, TAKE CARE OF THINGS AT HOME, OR GET ALONG WITH OTHER PEOPLE: 1
1. LITTLE INTEREST OR PLEASURE IN DOING THINGS: 1
9. THOUGHTS THAT YOU WOULD BE BETTER OFF DEAD, OR OF HURTING YOURSELF: 0
SUM OF ALL RESPONSES TO PHQ QUESTIONS 1-9: 8
7. TROUBLE CONCENTRATING ON THINGS, SUCH AS READING THE NEWSPAPER OR WATCHING TELEVISION: 0
SUM OF ALL RESPONSES TO PHQ QUESTIONS 1-9: 8
6. FEELING BAD ABOUT YOURSELF - OR THAT YOU ARE A FAILURE OR HAVE LET YOURSELF OR YOUR FAMILY DOWN: 1
2. FEELING DOWN, DEPRESSED OR HOPELESS: 1
SUM OF ALL RESPONSES TO PHQ QUESTIONS 1-9: 8
5. POOR APPETITE OR OVEREATING: 0
8. MOVING OR SPEAKING SO SLOWLY THAT OTHER PEOPLE COULD HAVE NOTICED. OR THE OPPOSITE, BEING SO FIGETY OR RESTLESS THAT YOU HAVE BEEN MOVING AROUND A LOT MORE THAN USUAL: 0
3. TROUBLE FALLING OR STAYING ASLEEP: 3
SUM OF ALL RESPONSES TO PHQ QUESTIONS 1-9: 8
SUM OF ALL RESPONSES TO PHQ9 QUESTIONS 1 & 2: 2
4. FEELING TIRED OR HAVING LITTLE ENERGY: 2

## 2022-08-15 ASSESSMENT — ENCOUNTER SYMPTOMS
DIARRHEA: 0
SHORTNESS OF BREATH: 0
BLOOD IN STOOL: 0
ABDOMINAL PAIN: 0
ALLERGIC/IMMUNOLOGIC NEGATIVE: 1
CONSTIPATION: 0
EYES NEGATIVE: 1
COUGH: 0

## 2022-08-15 ASSESSMENT — SOCIAL DETERMINANTS OF HEALTH (SDOH): HOW HARD IS IT FOR YOU TO PAY FOR THE VERY BASICS LIKE FOOD, HOUSING, MEDICAL CARE, AND HEATING?: NOT HARD AT ALL

## 2022-08-15 NOTE — PROGRESS NOTES
[address] [place] [address] [facility] [address]  Dept: [phone]    8/15/2022    CHIEF COMPLAINT    Chief Complaint   Patient presents with    Gynecologic Exam       HPI    Jordan Jacobs is a 30 y.o. female who presents   Chief Complaint   Patient presents with    Gynecologic Exam   .  Encounter for gyn exam. Last pap over 5 years ago. Menses are monthly and sometimes heavy and painful but not every month. Her  has had a vasectomy. She is monitoring her blood sugar with the Dexcom continuous glucose monitor. Has difficulty keeping her blood sugar under 200. Using [place] injection 70 units along with Janumet. She is trying to follow healthy low-carb diet but struggles with her weight. Taking bupropion for depression which is effective in managing her mood. Vitals:    08/15/22 0825   BP: 130/80   Pulse: 100   SpO2: 98%   Weight: (!) 327 lb (148.3 kg)       REVIEW OF SYSTEMS    Review of Systems   Constitutional:  Negative for fatigue, fever and unexpected weight change. HENT: Negative. Eyes: Negative. Respiratory:  Negative for cough and shortness of breath. Cardiovascular:  Negative for chest pain and leg swelling. Gastrointestinal:  Negative for abdominal pain, blood in stool, constipation and diarrhea. Endocrine: Negative. Genitourinary:  Negative for frequency, menstrual problem and urgency. Musculoskeletal: Negative. Skin: Negative. Allergic/Immunologic: Negative. Neurological:  Negative for dizziness and headaches. Hematological: Negative. Psychiatric/Behavioral:  Negative for sleep disturbance. The patient is not nervous/anxious.       PAST MEDICAL HISTORY    Past Medical History:   Diagnosis Date    Anxiety and depression     Depression     Migraines     Obesity     PONV (postoperative nausea and vomiting)     Seasonal allergies     Sleep apnea     uses cpap nightly    Type 2 diabetes mellitus without complication ([facility] [address]        FAMILY HISTORY    Family History   Problem Relation Age of Onset    High Blood Pressure Mother     Breast Cancer Paternal Aunt     Heart Attack Maternal Grandfather     High Blood Pressure Maternal Grandfather     Stroke Maternal Grandfather     Diabetes Paternal Uncle     Cancer Maternal Aunt     Breast Cancer Maternal Aunt        SOCIAL HISTORY    Social History     Socioeconomic History    Marital status:      Spouse name: None    Number of children: 2    Years of education: None    Highest education level: None   Occupational History    Occupation:      Employer: [employer]   Tobacco Use    Smoking status: Former     Packs/day: 0.50     Years: 10.00     Pack years: 5.00     Types: Cigarettes    Smokeless tobacco: Never    Tobacco comments:     quit smoking 4 yrs ago   Vaping Use    Vaping Use: Former   Substance and Sexual Activity    Alcohol use: Yes     Comment: social    Drug use: No    Sexual activity: Yes     Partners: Male     Social Determinants of Health     Financial Resource Strain: Low Risk     Difficulty of Paying Living Expenses: Not hard at all   Food Insecurity: No Food Insecurity    Worried About Running Out of Food in the Last Year: Never true    Ran Out of Food in the Last Year: Never true       SURGICAL HISTORY    Past Surgical History:   Procedure Laterality Date     SECTION      LAPAROSCOPY  2018    LAPAROSCOPY DIAGNOSTIC (N/A Abdomen), Lysis of adhesions    [place] LAP,DIAGNOSTIC ABDOMEN N/A 2018    LAPAROSCOPY DIAGNOSTIC performed by Jim Heller MD at [place] lt wrist age 7yrs       CURRENT MEDICATIONS    Current Outpatient Medications   Medication Sig Dispense Refill    TRESIBA FLEXTOUCH 100 UNIT/ML SOPN Inject 80 Units into the skin daily (with breakfast) 15 mL 5    lisinopril (PRINIVIL;ZESTRIL) 5 MG tablet TAKE ONE TABLET BY MOUTH DAILY 30 tablet 3 Continuous Blood Gluc Sensor (DEXCOM G6 SENSOR) MISC USE AS DIRECTED 3 each 2    KROGER PEN NEEDLES 31G X 6 MM MISC USE ONCE DAILY WITH TRESIBA 100 each 2    JANUMET -1000 MG TB24 TAKE ONE TABLET BY MOUTH DAILY 30 tablet 5    buPROPion (WELLBUTRIN XL) 300 MG extended release tablet TAKE ONE TABLET BY MOUTH DAILY 30 tablet 5    atorvastatin (LIPITOR) 20 MG tablet TAKE ONE TABLET BY MOUTH DAILY 30 tablet 5    Cholecalciferol (VITAMIN D3) 125 MCG (5000 UT) CAPS TAKE ONE CAPSULE BY MOUTH DAILY 90 capsule 3    Continuous Blood Gluc Transmit (DEXCOM G6 TRANSMITTER) MISC 1 Device by Does not apply route continuous 2 each 1    Continuous Blood Gluc  (DEXCOM G6 ) SONIA 1 Device by Does not apply route continuous 1 each 1    naproxen (NAPROSYN) 500 MG tablet TAKE ONE TABLET BY MOUTH TWICE A DAY WITH FOOD 60 tablet 5    Cetirizine HCl (ZYRTEC PO) Take by mouth daily       No current facility-administered medications for this visit. ALLERGIES    Allergies   Allergen Reactions    Penicillins Anaphylaxis       PHYSICAL EXAM   Physical Exam  Vitals reviewed. Constitutional:       Appearance: She is well-developed. She is obese. HENT:      Head: Normocephalic. Eyes:      Pupils: Pupils are equal, round, and reactive to light. Neck:      Thyroid: No thyromegaly. Cardiovascular:      Rate and Rhythm: Normal rate and regular rhythm. Heart sounds: Normal heart sounds. No murmur heard. Pulmonary:      Effort: Pulmonary effort is normal.      Breath sounds: Normal breath sounds. No wheezing or rales. Chest:   Breasts:     Right: No inverted nipple, mass, nipple discharge, skin change, tenderness, axillary adenopathy or supraclavicular adenopathy. Left: No inverted nipple, mass, nipple discharge, skin change, tenderness, axillary adenopathy or supraclavicular adenopathy. Comments: Large, pendulous breasts. Abdominal:      Palpations: Abdomen is soft. Tenderness:  There is no current medication    4. Morbid obesity due to excess calories [facility])  Not well controlled. Continue efforts to follow low-carb diet    5. Vitamin D deficiency  Getting labs done today. Adjust supplement if indicated    6. Obstructive sleep apnea syndrome  Continue using CPAP    Kathleen Amado received counseling on the following healthy behaviors: nutrition, exercise, and medication adherence  Reviewed prior labs and health maintenance. Continue current medications, diet and exercise. Discussed use, benefit, and side effects of prescribed medications. Barriers to medication compliance addressed. Patient given educational materials - see patient instructions. All patient questions answered. Patient voiced understanding. Return in about 3 months (around 11/15/2022) for diabetes.         Electronically signed by Una Velázquez MD on 8/15/22 at 8:30 AM EDT

## 2022-08-16 ENCOUNTER — TELEPHONE (OUTPATIENT)
Dept: FAMILY MEDICINE CLINIC | Age: 37
End: 2022-08-16

## 2022-08-16 NOTE — TELEPHONE ENCOUNTER
Spoke with patient went over lab work result per Dr. Juan Diego Meadows patient verbalized understanding.

## 2022-08-16 NOTE — TELEPHONE ENCOUNTER
----- Message from [employer] sent at 8/16/2022 10:44 AM EDT -----  Subject: Message to Provider    QUESTIONS  Information for Provider? Pt states she received her test results from my   chart but they are kind of confusing and no notes were left would like for   someone to go over those with her if possible   ---------------------------------------------------------------------------  --------------  Jaswinder Trampoline INFO  [phone]; OK to leave message on voicemail  ---------------------------------------------------------------------------  --------------  SCRIPT ANSWERS  Relationship to Patient?  Self

## 2022-08-17 LAB
HPV SAMPLE: NORMAL
HPV, GENOTYPE 16: NOT DETECTED
HPV, GENOTYPE 18: NOT DETECTED
HPV, HIGH RISK OTHER: NOT DETECTED
HPV, INTERPRETATION: NORMAL
SPECIMEN DESCRIPTION: NORMAL

## 2022-08-19 LAB — CYTOLOGY REPORT: NORMAL

## 2022-09-03 DIAGNOSIS — Z79.4 CONTROLLED TYPE 2 DIABETES MELLITUS WITHOUT COMPLICATION, WITH LONG-TERM CURRENT USE OF INSULIN (HCC): ICD-10-CM

## 2022-09-03 DIAGNOSIS — E11.9 CONTROLLED TYPE 2 DIABETES MELLITUS WITHOUT COMPLICATION, WITH LONG-TERM CURRENT USE OF INSULIN (HCC): ICD-10-CM

## 2022-09-06 RX ORDER — INSULIN DEGLUDEC INJECTION 100 U/ML
INJECTION, SOLUTION SUBCUTANEOUS
Qty: 15 ML | Refills: 5 | Status: SHIPPED | OUTPATIENT
Start: 2022-09-06

## 2022-09-25 DIAGNOSIS — M79.642 BILATERAL HAND PAIN: ICD-10-CM

## 2022-09-25 DIAGNOSIS — M79.641 BILATERAL HAND PAIN: ICD-10-CM

## 2022-09-26 RX ORDER — NAPROXEN 500 MG/1
TABLET ORAL
Qty: 60 TABLET | Refills: 5 | Status: SHIPPED | OUTPATIENT
Start: 2022-09-26

## 2022-10-04 ENCOUNTER — OFFICE VISIT (OUTPATIENT)
Dept: PODIATRY | Age: 37
End: 2022-10-04
Payer: COMMERCIAL

## 2022-10-04 VITALS — HEIGHT: 63 IN | BODY MASS INDEX: 51.91 KG/M2 | WEIGHT: 293 LBS

## 2022-10-04 DIAGNOSIS — M79.671 RIGHT FOOT PAIN: Primary | ICD-10-CM

## 2022-10-04 DIAGNOSIS — M76.821 POSTERIOR TIBIAL TENDON DYSFUNCTION (PTTD) OF RIGHT LOWER EXTREMITY: ICD-10-CM

## 2022-10-04 PROCEDURE — 99214 OFFICE O/P EST MOD 30 MIN: CPT | Performed by: PODIATRIST

## 2022-10-04 NOTE — PATIENT INSTRUCTIONS
Schedule a Vaccine  When you qualify to receive the vaccine, call the [facility]) COVID-19 Vaccination Hotline to schedule your appointment or to get additional information about the [facility]) locations which are offering the COVID-19 vaccine. To be 94% effective, it's important that you receive two doses of one of the COVID-19 vaccines. -If you are receiving the Haynes Peter vaccine, your second shot will be scheduled as close to 21 days after the first shot as possible. -If you are receiving the Moderna vaccine, your second shot will be scheduled as close to 28 days after the first shot as possible. [facility]) COVID-19 Vaccination Hotline: [phone]    Links to [facility]) website and [facility] website:    [email or web address]    [email or web address]

## 2022-10-04 NOTE — PROGRESS NOTES
[facility]  [address] [facility]  [address] [facility] [address]  Dept: [phone]  Dept Fax: [phone]    RETURN PATIENT PROGRESS NOTE  Date of patient's visit: 10/4/2022  Patient's Name:  Mohit Arora YOB: 1985            Patient Care Team:  Uma Ruiz MD as PCP - Ricardo Valencia MD as PCP - [facility] Empaneled Provider  Reese Green DPM as Physician (Podiatry)       Mohit Arora 30 y.o. female that presents for follow-up of   Chief Complaint   Patient presents with    Foot Pain     Right foot    Swelling     Right foot     Symptoms began several month(s) ago and are unchanged . Patient relates pain is Present. Pain is rated 3 out of 10 and is described as constant, mild. Treatments prior to today's visit include: previous podiatry treatment   Currently denies F/C/N/V. Allergies   Allergen Reactions    Penicillins Anaphylaxis and Other (See Comments)       Past Medical History:   Diagnosis Date    Anxiety and depression     Depression     Migraines     Obesity     PONV (postoperative nausea and vomiting)     Seasonal allergies     Sleep apnea     uses cpap nightly    Type 2 diabetes mellitus without complication ([facility] [address]        Prior to Admission medications    Medication Sig Start Date End Date Taking?  Authorizing Provider   naproxen (NAPROSYN) 500 MG tablet TAKE ONE TABLET BY MOUTH TWICE A DAY WITH FOOD 9/26/22  Yes Uma Ruiz MD   TRESIBA FLEXTOUCH 100 UNIT/ML SOPN INJECT 70 UNITS UNDER THE SKIN DAILY 9/6/22  Yes Uma Ruiz MD   lisinopril (PRINIVIL;ZESTRIL) 5 MG tablet TAKE ONE TABLET BY MOUTH DAILY 7/26/22  Yes Pina Graham DO   Continuous Blood Gluc Sensor (DEXCOM G6 SENSOR) MISC USE AS DIRECTED 7/14/22  Yes Uma Ruiz MD   [employer] PEN NEEDLES 31G X 6 MM MISC USE ONCE DAILY WITH TRESIBA 7/14/22  Yes Uma Ruiz MD   JANUMET -1000 MG TB24 TAKE ONE TABLET BY MOUTH DAILY 5/25/22  Yes Chris Rader MD   buPROPion (WELLBUTRIN XL) 300 MG extended release tablet TAKE ONE TABLET BY MOUTH DAILY 5/25/22  Yes Chris Rader MD   atorvastatin (LIPITOR) 20 MG tablet TAKE ONE TABLET BY MOUTH DAILY 5/25/22  Yes Chris Rader MD   Cholecalciferol (VITAMIN D3) 125 MCG (5000 UT) CAPS TAKE ONE CAPSULE BY MOUTH DAILY 1/28/22  Yes Chris Rader MD   Continuous Blood Gluc Transmit (DEXCOM G6 TRANSMITTER) MISC 1 Device by Does not apply route continuous 12/3/21  Yes Chris Rader MD   Continuous Blood Gluc  (DEXCOM G6 ) SONIA 1 Device by Does not apply route continuous 12/3/21  Yes Chris Rader MD   Cetirizine HCl (ZYRTEC PO) Take by mouth daily   Yes Historical Provider, MD       Review of Systems    Review of Systems:  History obtained from chart review and the patient  General ROS: negative for - chills, fatigue, fever, night sweats or weight gain  Constitutional: Negative for chills, diaphoresis, fatigue, fever and unexpected weight change. Musculoskeletal: Positive for arthralgias, gait problem and joint swelling. Neurological ROS: negative for - behavioral changes, confusion, headaches or seizures. Negative for weakness and numbness. Dermatological ROS: negative for - mole changes, rash  Cardiovascular: Negative for leg swelling. Gastrointestinal: Negative for constipation, diarrhea, nausea and vomiting. Lower Extremity Physical Examination:     Vitals: There were no vitals filed for this visit. General: AAO x 3 in NAD. Dermatologic Exam:  Skin lesion/ulceration Absent . Skin No rashes or nodules noted. .       Musculoskeletal:     1st MPJ ROM decreased, Bilateral.  Muscle strength 5/5, Bilateral.  Pain present upon palpation of right navicular tuberosity with pain going proximally to the medial malleolus . Medial longitudinal arch, Bilateral WNL.   Ankle ROM WNL,Bilateral.    Dorsally contracted digits absent digits 1-5 Bilateral. Vascular: DP and PT pulses palpable 2/4, Bilateral.  CFT <3 seconds, Bilateral.  Hair growth present to the level of the digits, Bilateral.  Edema absent, Bilateral.  Varicosities absent, Bilateral. Erythema absent, Bilateral    Neurological: Sensation intact to light touch to level of digits, Bilateral.  Protective sensation intact 10/10 sites via 5.07/10g [place] Monofilament, Bilateral.  negative Tinel's, Bilateral.  negative Valleix sign, Bilateral.      Integument: Warm, dry, supple, Bilateral.  Open lesion absent, Bilateral.  Interdigital maceration absent to web spaces 1-4, Bilateral.  Nails are normal in length, thickness and color 1-5 bilateral.  Fissures absent, Bilateral.     X rays right foot 3 views:  accessory navicular present with edema present medially         Asessment: Patient is a 30 y.o. female with:   1. Right foot pain    2. Posterior tibial tendon dysfunction (PTTD) of right lower extremity        Plan: Patient examined and evaluated. Current condition and treatment options discussed in detail. Advised pt to her x ray findings of the right foot. Pt to stay in CAm boot . Will need an MRI for possible surgical intervention . Verbal and written instructions given to patient. Contact office with any questions/problems/concerns. No orders of the defined types were placed in this encounter. No orders of the defined types were placed in this encounter. All labs were reviewed and all imagining including the above findings were reviewed PRIOR to the patients arrival and with the patient today. Previous patient encounter was reviewed. Encounters from the patients other medical providers were reviewed and noted. Time was spent educating the patient and their families/caregivers on proper care of the feet and ankles. All the above diagnosis were addressed at todays visit and all questions were answered.   A total of 30 minutes was spent with this patients encounter which included charting after the patients visit     RTC in 2week(s).     10/4/2022      Electronically signed by Vera Espinosa DPM on 10/4/2022 at 3:17 PM  10/4/2022

## 2022-10-08 DIAGNOSIS — E11.9 CONTROLLED TYPE 2 DIABETES MELLITUS WITHOUT COMPLICATION, WITHOUT LONG-TERM CURRENT USE OF INSULIN (HCC): ICD-10-CM

## 2022-10-10 RX ORDER — BLOOD-GLUCOSE SENSOR
EACH MISCELLANEOUS
Qty: 3 EACH | Refills: 2 | Status: SHIPPED | OUTPATIENT
Start: 2022-10-10

## 2022-10-10 NOTE — TELEPHONE ENCOUNTER
Richard Segundo is calling to request a refill on the following medication(s):    Medication Request:  Requested Prescriptions     Pending Prescriptions Disp Refills    Continuous Blood Gluc Sensor ([address]) MISC [Pharmacy Med Name: Porfirio Harrison SENSOR] 3 each 2     Sig: USE AS DIRECTED TO MEASURE BLOOD SUGAR.  CHANGE SENSOR EVERY 10 DAYS       Last Visit Date (If Applicable):  0/20/1095    Next Visit Date:    11/7/2022

## 2022-10-17 ENCOUNTER — HOSPITAL ENCOUNTER (OUTPATIENT)
Dept: MRI IMAGING | Age: 37
Discharge: HOME | End: 2022-10-19
Payer: COMMERCIAL

## 2022-10-17 DIAGNOSIS — M79.671 RIGHT FOOT PAIN: ICD-10-CM

## 2022-10-17 PROCEDURE — 73718 MRI LOWER EXTREMITY W/O DYE: CPT

## 2022-11-07 ENCOUNTER — HOSPITAL ENCOUNTER (OUTPATIENT)
Age: 37
Setting detail: SPECIMEN
Discharge: HOME | End: 2022-11-07

## 2022-11-07 ENCOUNTER — OFFICE VISIT (OUTPATIENT)
Dept: FAMILY MEDICINE CLINIC | Age: 37
End: 2022-11-07
Payer: COMMERCIAL

## 2022-11-07 VITALS
WEIGHT: 293 LBS | HEART RATE: 104 BPM | DIASTOLIC BLOOD PRESSURE: 88 MMHG | OXYGEN SATURATION: 97 % | BODY MASS INDEX: 58 KG/M2 | SYSTOLIC BLOOD PRESSURE: 118 MMHG

## 2022-11-07 DIAGNOSIS — R74.8 ELEVATED LIVER ENZYMES: ICD-10-CM

## 2022-11-07 DIAGNOSIS — E11.9 CONTROLLED TYPE 2 DIABETES MELLITUS WITHOUT COMPLICATION, WITH LONG-TERM CURRENT USE OF INSULIN (HCC): Primary | ICD-10-CM

## 2022-11-07 DIAGNOSIS — I10 ESSENTIAL HYPERTENSION: ICD-10-CM

## 2022-11-07 DIAGNOSIS — Z23 FLU VACCINE NEED: ICD-10-CM

## 2022-11-07 DIAGNOSIS — G47.33 OSA (OBSTRUCTIVE SLEEP APNEA): ICD-10-CM

## 2022-11-07 DIAGNOSIS — Z79.4 CONTROLLED TYPE 2 DIABETES MELLITUS WITHOUT COMPLICATION, WITH LONG-TERM CURRENT USE OF INSULIN (HCC): Primary | ICD-10-CM

## 2022-11-07 DIAGNOSIS — E55.9 VITAMIN D DEFICIENCY: ICD-10-CM

## 2022-11-07 DIAGNOSIS — E78.2 MIXED HYPERLIPIDEMIA: ICD-10-CM

## 2022-11-07 DIAGNOSIS — E66.01 MORBID OBESITY DUE TO EXCESS CALORIES (HCC): ICD-10-CM

## 2022-11-07 LAB
ALBUMIN SERPL-MCNC: 4 G/DL (ref 3.5–5.2)
ALBUMIN/GLOBULIN RATIO: 1.4 (ref 1–2.5)
ALP BLD-CCNC: 65 U/L (ref 35–104)
ALT SERPL-CCNC: 54 U/L (ref 5–33)
ANION GAP SERPL CALCULATED.3IONS-SCNC: 12 MMOL/L (ref 9–17)
AST SERPL-CCNC: 27 U/L
BILIRUB SERPL-MCNC: 0.4 MG/DL (ref 0.3–1.2)
BUN BLDV-MCNC: 18 MG/DL (ref 6–20)
CALCIUM SERPL-MCNC: 9.4 MG/DL (ref 8.6–10.4)
CHLORIDE BLD-SCNC: 100 MMOL/L (ref 98–107)
CO2: 25 MMOL/L (ref 20–31)
CREAT SERPL-MCNC: 0.76 MG/DL (ref 0.5–0.9)
GFR SERPL CREATININE-BSD FRML MDRD: >60 ML/MIN/1.73M2
GLUCOSE BLD-MCNC: 313 MG/DL (ref 70–99)
HBA1C MFR BLD: 9.6 %
POTASSIUM SERPL-SCNC: 4.7 MMOL/L (ref 3.7–5.3)
SODIUM BLD-SCNC: 137 MMOL/L (ref 135–144)
TOTAL PROTEIN: 6.9 G/DL (ref 6.4–8.3)

## 2022-11-07 PROCEDURE — 99214 OFFICE O/P EST MOD 30 MIN: CPT | Performed by: FAMILY MEDICINE

## 2022-11-07 PROCEDURE — 3078F DIAST BP <80 MM HG: CPT | Performed by: FAMILY MEDICINE

## 2022-11-07 PROCEDURE — 90471 IMMUNIZATION ADMIN: CPT | Performed by: FAMILY MEDICINE

## 2022-11-07 PROCEDURE — 83036 HEMOGLOBIN GLYCOSYLATED A1C: CPT | Performed by: FAMILY MEDICINE

## 2022-11-07 PROCEDURE — 3046F HEMOGLOBIN A1C LEVEL >9.0%: CPT | Performed by: FAMILY MEDICINE

## 2022-11-07 PROCEDURE — 90674 CCIIV4 VAC NO PRSV 0.5 ML IM: CPT | Performed by: FAMILY MEDICINE

## 2022-11-07 PROCEDURE — 3074F SYST BP LT 130 MM HG: CPT | Performed by: FAMILY MEDICINE

## 2022-11-07 RX ORDER — INSULIN DEGLUDEC INJECTION 100 U/ML
80 INJECTION, SOLUTION SUBCUTANEOUS
Qty: 10 ML | Refills: 5 | Status: SHIPPED | OUTPATIENT
Start: 2022-11-07

## 2022-11-07 SDOH — ECONOMIC STABILITY: FOOD INSECURITY: WITHIN THE PAST 12 MONTHS, THE FOOD YOU BOUGHT JUST DIDN'T LAST AND YOU DIDN'T HAVE MONEY TO GET MORE.: NEVER TRUE

## 2022-11-07 SDOH — ECONOMIC STABILITY: FOOD INSECURITY: WITHIN THE PAST 12 MONTHS, YOU WORRIED THAT YOUR FOOD WOULD RUN OUT BEFORE YOU GOT MONEY TO BUY MORE.: NEVER TRUE

## 2022-11-07 ASSESSMENT — PATIENT HEALTH QUESTIONNAIRE - PHQ9
SUM OF ALL RESPONSES TO PHQ9 QUESTIONS 1 & 2: 1
1. LITTLE INTEREST OR PLEASURE IN DOING THINGS: NOT AT ALL
10. IF YOU CHECKED OFF ANY PROBLEMS, HOW DIFFICULT HAVE THESE PROBLEMS MADE IT FOR YOU TO DO YOUR WORK, TAKE CARE OF THINGS AT HOME, OR GET ALONG WITH OTHER PEOPLE: NOT DIFFICULT AT ALL
2. FEELING DOWN, DEPRESSED OR HOPELESS: SEVERAL DAYS

## 2022-11-07 ASSESSMENT — ENCOUNTER SYMPTOMS
ALLERGIC/IMMUNOLOGIC NEGATIVE: 1
SORE THROAT: 1
DIARRHEA: 0
EYES NEGATIVE: 1
COUGH: 0
CONSTIPATION: 0
ABDOMINAL PAIN: 0
BLOOD IN STOOL: 0
SHORTNESS OF BREATH: 0

## 2022-11-07 ASSESSMENT — SOCIAL DETERMINANTS OF HEALTH (SDOH): HOW HARD IS IT FOR YOU TO PAY FOR THE VERY BASICS LIKE FOOD, HOUSING, MEDICAL CARE, AND HEATING?: HARD

## 2022-11-07 NOTE — PROGRESS NOTES
[address] [place] [address] [facility] [address]  Dept: [phone]    11/7/2022    CHIEF COMPLAINT    Chief Complaint   Patient presents with    Diabetes     Patient is here today for a 3 month f/u on diabetes       HPI    Abhay Mane is a 30 y.o. female who presents   Chief Complaint   Patient presents with    Diabetes     Patient is here today for a 3 month f/u on diabetes   . Recheck chronic diabetes, high cholesterol, elevated liver enzymes. bs running as high as 300 despite increase in tresiba to 80 units. Not exercising regularly but is trying to get back into her routine. She is interested in trying medication to help with weight loss. Using CPAP nightly for obstructive sleep apnea. She uses an auto setting. She is in need of new tubing. She recently cleaned her equipment when she found black mold in it. Since then she has had nasal congestion and a sore throat. Is taking Zyrtec daily. Vitals:    11/07/22 0801   BP: 118/88   Pulse: (!) 104   SpO2: 97%   Weight: (!) 327 lb 6.4 oz (148.5 kg)       REVIEW OF SYSTEMS    Review of Systems   Constitutional:  Negative for fatigue, fever and unexpected weight change. HENT:  Positive for congestion and sore throat. Eyes: Negative. Respiratory:  Negative for cough and shortness of breath. Cardiovascular:  Negative for chest pain and leg swelling. Gastrointestinal:  Negative for abdominal pain, blood in stool, constipation and diarrhea. Endocrine: Negative. Genitourinary:  Negative for frequency and urgency. Musculoskeletal:  Positive for arthralgias (rt foot). Skin: Negative. Allergic/Immunologic: Negative. Neurological:  Negative for dizziness and headaches. Hematological: Negative. Psychiatric/Behavioral:  Negative for sleep disturbance. The patient is not nervous/anxious.       PAST MEDICAL HISTORY    Past Medical History:   Diagnosis Date Anxiety and depression     Depression     Migraines     Obesity     PONV (postoperative nausea and vomiting)     Seasonal allergies     Sleep apnea     uses cpap nightly    Type 2 diabetes mellitus without complication (HCC)        FAMILY HISTORY    Family History   Problem Relation Age of Onset    High Blood Pressure Mother     Breast Cancer Paternal Aunt     Heart Attack Maternal Grandfather     High Blood Pressure Maternal Grandfather     Stroke Maternal Grandfather     Diabetes Paternal Uncle     Cancer Maternal Aunt     Breast Cancer Maternal Aunt        SOCIAL HISTORY    Social History     Socioeconomic History    Marital status:      Spouse name: None    Number of children: 2    Years of education: None    Highest education level: None   Occupational History    Occupation:      Employer: [employer]   Tobacco Use    Smoking status: Former     Packs/day: 0.50     Years: 10.00     Pack years: 5.00     Types: Cigarettes    Smokeless tobacco: Never    Tobacco comments:     quit smoking 4 yrs ago   Vaping Use    Vaping Use: Former   Substance and Sexual Activity    Alcohol use: Yes     Comment: social    Drug use: No    Sexual activity: Yes     Partners: Male     Social Determinants of Health     Financial Resource Strain: High Risk    Difficulty of Paying Living Expenses: Hard   Food Insecurity: No Food Insecurity    Worried About Running Out of Food in the Last Year: Never true    Ran Out of Food in the Last Year: Never true       SURGICAL HISTORY    Past Surgical History:   Procedure Laterality Date     SECTION      LAPAROSCOPY  2018    LAPAROSCOPY DIAGNOSTIC (N/A Abdomen), Lysis of adhesions    IN LAP,DIAGNOSTIC ABDOMEN N/A 2018    LAPAROSCOPY DIAGNOSTIC performed by Glenys Gomez MD at [place] lt wrist age 7yrs       CURRENT MEDICATIONS    Current Outpatient Medications   Medication Sig Dispense Refill    empagliflozin (JARDIANCE) 10 MG tablet Take 1 tablet by mouth daily 90 tablet 1    TRESIBA FLEXTOUCH 100 UNIT/ML SOPN Inject 80 Units into the skin daily (with breakfast) 10 mL 5    Misc. Devices MISC Cpap tubing, mouth piece and humidifier 1 each 0    Continuous Blood Gluc Sensor (DEXCOM G6 SENSOR) MISC USE AS DIRECTED TO MEASURE BLOOD SUGAR. CHANGE SENSOR EVERY 10 DAYS 3 each 2    naproxen (NAPROSYN) 500 MG tablet TAKE ONE TABLET BY MOUTH TWICE A DAY WITH FOOD 60 tablet 5    lisinopril (PRINIVIL;ZESTRIL) 5 MG tablet TAKE ONE TABLET BY MOUTH DAILY 30 tablet 3    [employer] PEN NEEDLES 31G X 6 MM MISC USE ONCE DAILY WITH TRESIBA 100 each 2    JANUMET -1000 MG TB24 TAKE ONE TABLET BY MOUTH DAILY 30 tablet 5    buPROPion (WELLBUTRIN XL) 300 MG extended release tablet TAKE ONE TABLET BY MOUTH DAILY 30 tablet 5    atorvastatin (LIPITOR) 20 MG tablet TAKE ONE TABLET BY MOUTH DAILY 30 tablet 5    Cholecalciferol (VITAMIN D3) 125 MCG (5000 UT) CAPS TAKE ONE CAPSULE BY MOUTH DAILY 90 capsule 3    Continuous Blood Gluc Transmit (DEXCOM G6 TRANSMITTER) MISC 1 Device by Does not apply route continuous 2 each 1    Continuous Blood Gluc  (DEXCOM G6 ) SONIA 1 Device by Does not apply route continuous 1 each 1    Cetirizine HCl (ZYRTEC PO) Take by mouth daily       No current facility-administered medications for this visit. ALLERGIES    Allergies   Allergen Reactions    Penicillins Anaphylaxis and Other (See Comments)       PHYSICAL EXAM   Physical Exam  Vitals reviewed. Constitutional:       Appearance: She is well-developed. She is obese. HENT:      Head: Normocephalic. Nose: Congestion present. Eyes:      Pupils: Pupils are equal, round, and reactive to light. Neck:      Thyroid: No thyromegaly. Cardiovascular:      Rate and Rhythm: Normal rate and regular rhythm. Heart sounds: Normal heart sounds. No murmur heard.   Pulmonary:      Effort: Pulmonary effort is normal.      Breath sounds: Normal breath sounds. No wheezing or rales. Abdominal:      Palpations: Abdomen is soft. Tenderness: There is no abdominal tenderness. There is no guarding or rebound. Musculoskeletal:         General: Deformity (swelling dorsum of rt foot) present. No tenderness. Normal range of motion. Cervical back: Normal range of motion and neck supple. Lymphadenopathy:      Cervical: No cervical adenopathy. Skin:     General: Skin is warm and dry. Neurological:      Mental Status: She is alert and oriented to person, place, and time. Psychiatric:         Mood and Affect: Mood normal.         Behavior: Behavior normal.         Thought Content: Thought content normal.         Judgment: Judgment normal.       ASSESSMENT/PLAN  1. Controlled type 2 diabetes mellitus without complication, with long-term current use of insulin ([address]  A1C-9.6  Restart Jardiance. Continue Janumet and [place]. Refer to clinical pharmacist  - empagliflozin (JARDIANCE) 10 MG tablet; Take 1 tablet by mouth daily  Dispense: 90 tablet; Refill: 1  - Ambulatory Referral to Primary Care Pharmacist  - TRESIBA FLEXTOUCH 100 UNIT/ML SOPN; Inject 80 Units into the skin daily (with breakfast)  Dispense: 10 mL; Refill: 5    2. Essential hypertension  Chronic and controlled. Continue ACE  - Comprehensive Metabolic Panel; Future    3. Vitamin D deficiency  Continue supplement    4. Morbid obesity due to excess calories (HCC)  Discussed possibility of starting Ozempic. She will discuss this with the clinical pharmacist    5. Mixed hyperlipidemia  Continue statin unless liver enzymes have increased    6. Elevated liver enzymes  Recheck and consider whether statin is appropriate  - Comprehensive Metabolic Panel; Future    7. JUSTYNA (obstructive sleep apnea)    - Misc. Devices MISC; Cpap tubing, mouth piece and humidifier  Dispense: 1 each;  Refill: 0     Isha received counseling on the following healthy behaviors: nutrition, exercise, and medication adherence  Reviewed prior labs and health maintenance. Continue current medications, diet and exercise. Discussed use, benefit, and side effects of prescribed medications. Barriers to medication compliance addressed. Patient given educational materials - see patient instructions. All patient questions answered. Patient voiced understanding. Return in about 4 months (around 3/7/2023) for diabetes.         Electronically signed by Mimi Anderson MD on 11/7/22 at 8:10 AM EST

## 2022-11-10 ENCOUNTER — OFFICE VISIT (OUTPATIENT)
Dept: PODIATRY | Age: 37
End: 2022-11-10
Payer: COMMERCIAL

## 2022-11-10 VITALS — WEIGHT: 293 LBS | BODY MASS INDEX: 51.91 KG/M2 | HEIGHT: 63 IN

## 2022-11-10 DIAGNOSIS — M79.671 PAIN IN RIGHT FOOT: ICD-10-CM

## 2022-11-10 DIAGNOSIS — M79.671 RIGHT FOOT PAIN: Primary | ICD-10-CM

## 2022-11-10 DIAGNOSIS — R60.0 EDEMA OF RIGHT FOOT: ICD-10-CM

## 2022-11-10 PROCEDURE — 99214 OFFICE O/P EST MOD 30 MIN: CPT | Performed by: PODIATRIST

## 2022-11-10 NOTE — PROGRESS NOTES
[address]  [address] [place] [address]  Dept: [phone]  Dept Fax: [phone]    RETURN PATIENT PROGRESS NOTE  Date of patient's visit: 11/10/2022  Patient's Name:  Mohit Arora YOB: 1985            Patient Care Team:  Uma Ruiz MD as PCP - Ricardo Valencia MD as PCP - [facility] Empaneled Provider  Reese Green DPM as Physician (Podiatry)       Mohit Arora 30 y.o. female that presents for follow-up of   Chief Complaint   Patient presents with    Follow Up After Procedure     Right foot MRI review. Completed at [facility] on 10/04/2022     Pt's primary care physician is Uma Ruiz MD last seen 11/07/2022  Symptoms began several month(s) ago and are unchanged . Patient relates pain is Present. Pain is rated 1 out of 10 and is described as intermittent, mild, moderate. Treatments prior to today's visit include: MRI x rays but it has not helped . Currently denies F/C/N/V. Allergies   Allergen Reactions    Penicillins Anaphylaxis and Other (See Comments)       Past Medical History:   Diagnosis Date    Anxiety and depression     Depression     Migraines     Obesity     PONV (postoperative nausea and vomiting)     Seasonal allergies     Sleep apnea     uses cpap nightly    Type 2 diabetes mellitus without complication ([facility] [address]        Prior to Admission medications    Medication Sig Start Date End Date Taking? Authorizing Provider   empagliflozin (JARDIANCE) 10 MG tablet Take 1 tablet by mouth daily 11/7/22  Yes Uma Ruiz MD   TRESIBA FLEXTOUCH 100 UNIT/ML SOPN Inject 80 Units into the skin daily (with breakfast) 11/7/22  Yes Uma Ruiz MD   Misc. Devices MISC Cpap tubing, mouth piece and humidifier 11/7/22  Yes Uma Ruiz MD   Continuous Blood Gluc Sensor (DEXCOM G6 SENSOR) MISC USE AS DIRECTED TO MEASURE BLOOD SUGAR.  CHANGE SENSOR EVERY 10 DAYS 10/10/22  Yes Clotilda Klinefelter, MD   naproxen (NAPROSYN) 500 MG tablet TAKE ONE TABLET BY MOUTH TWICE A DAY WITH FOOD 9/26/22  Yes Clotilda Klinefelter, MD   lisinopril (PRINIVIL;ZESTRIL) 5 MG tablet TAKE ONE TABLET BY MOUTH DAILY 7/26/22  Yes Pina Graham DO   [employer] PEN NEEDLES 31G X 6 MM MISC USE ONCE DAILY WITH TRESIBA 7/14/22  Yes Clotilda Klinefelter, MD   JANUMET -1000 MG TB24 TAKE ONE TABLET BY MOUTH DAILY 5/25/22  Yes Clotilda Klinefelter, MD   buPROPion (WELLBUTRIN XL) 300 MG extended release tablet TAKE ONE TABLET BY MOUTH DAILY 5/25/22  Yes Clotilda Klinefelter, MD   atorvastatin (LIPITOR) 20 MG tablet TAKE ONE TABLET BY MOUTH DAILY 5/25/22  Yes Clotilda Klinefelter, MD   Cholecalciferol (VITAMIN D3) 125 MCG (5000 UT) CAPS TAKE ONE CAPSULE BY MOUTH DAILY 1/28/22  Yes Clotilda Klinefelter, MD   Continuous Blood Gluc Transmit (DEXCOM G6 TRANSMITTER) MISC 1 Device by Does not apply route continuous 12/3/21  Yes Clotilda Klinefelter, MD   Continuous Blood Gluc  (DEXCOM G6 ) SONIA 1 Device by Does not apply route continuous 12/3/21  Yes Clotilda Klinefelter, MD   Cetirizine HCl (ZYRTEC PO) Take by mouth daily   Yes Historical Provider, MD       Review of Systems    Review of Systems:  History obtained from chart review and the patient  General ROS: negative for - chills, fatigue, fever, night sweats or weight gain  Constitutional: Negative for chills, diaphoresis, fatigue, fever and unexpected weight change. Musculoskeletal: Positive for arthralgias, gait problem and joint swelling. Neurological ROS: negative for - behavioral changes, confusion, headaches or seizures. Negative for weakness and numbness. Dermatological ROS: negative for - mole changes, rash  Cardiovascular: Negative for leg swelling. Gastrointestinal: Negative for constipation, diarrhea, nausea and vomiting. Lower Extremity Physical Examination:     Vitals: There were no vitals filed for this visit. General: AAO x 3 in NAD.    Dermatologic Exam:  Skin lesion/ulceration Absent . Skin No rashes or nodules noted. .       Musculoskeletal:     1st MPJ ROM decreased, Bilateral.  Muscle strength 5/5, Bilateral.  Pain present upon palpation of right foot dorsal distal foot distal to the tattoo sight. Medial longitudinal arch, Bilateral WNL. Ankle ROM WNL,Bilateral.    Dorsally contracted digits absent digits 1-5 Bilateral.     Vascular: DP and PT pulses palpable 2/4, Bilateral.  CFT <3 seconds, Bilateral.  Hair growth present to the level of the digits, Bilateral.  Edema absent, Bilateral.  Varicosities absent, Bilateral. Erythema absent, Bilateral    Neurological: Sensation intact to light touch to level of digits, Bilateral.  Protective sensation intact 10/10 sites via 5.07/10g [place]enu Monofilament, Bilateral.  negative Tinel's, Bilateral.  negative Valleix sign, Bilateral.      Integument: Warm, dry, supple, Bilateral.  Open lesion absent, Bilateral.  Interdigital maceration absent to web spaces 1-4, Bilateral.  Nails are normal in length, thickness and color 1-5 bilateral.  Fissures absent, Bilateral.         MRI Right foot   1. Soft tissue markers at the dorsum of the foot with moderate fluid and   edema in the subcutaneous fat at the dorsum of the foot. No organized   discrete measurable hematoma or fluid collection. 2. Mild degenerative change at the 1st MTP/MTS joints. 3. No acute osseous abnormality. Asessment: Patient is a 30 y.o. female with:    Diagnosis Orders   1. Right foot pain  External Referral To Podiatry      2. Edema of right foot        3. Pain in right foot                Plan: Patient examined and evaluated. Current condition and treatment options discussed in detail. Advised pt to her conditon and reviewed the MRI with the patietn today personally. I had a long discussion today with the patient about the likely diagnosis and its natural history, physical exam and imaging findings, as well as treatment options.  We discussed both surgical and nonsurgical treatments, including risks and benefits. From a nonoperative standpoint, we discussed rest/activity modification, NSAIDs/Acetaminophen/topical anesthetics, orthotics, bracing/immobilization, and physical therapy. Surgically, we discussed exploration of the fluid with a smaller incision distal to the [place] tattoo and releaseing subcutaneous fluid. I discussed w her that there may not be fluid in there to drain and taht it may come back and exploritory surgery without a drainable mass my not be the best decision but it might help    I would like to send her to another physician for a second opinion   . Verbal and written instructions given to patient. Contact office with any questions/problems/concerns. No orders of the defined types were placed in this encounter. No orders of the defined types were placed in this encounter. All labs were reviewed and all imagining including the above findings were reviewed PRIOR to the patients arrival and with the patient today. Previous patient encounter was reviewed. Encounters from the patients other medical providers were reviewed and noted. Time was spent educating the patient and their families/caregivers on proper care of the feet and ankles. All the above diagnosis were addressed at todays visit and all questions were answered. A total of 30 minutes was spent with this patients encounter which included charting after the patients visit     RTC in [place].     11/10/2022      Electronically signed by Hardeep Farah DPM on 11/10/2022 at 9:36 AM  11/10/2022

## 2022-11-15 ENCOUNTER — TELEPHONE (OUTPATIENT)
Dept: FAMILY MEDICINE CLINIC | Age: 37
End: 2022-11-15

## 2022-11-15 NOTE — TELEPHONE ENCOUNTER
Medication Management Service    Date: 11/15/2022  Patient's Name: Jefry Thompson YOB: 1985            _____________________________________________________________________________________________    Jefry Thompson is a 30 y.o. female referred to ambulatory pharmacy by Pepe Gandara MD for diabetes management. Patient's A1c has been >9% recently. Patient is currently prescribed Jardiance 10 mg daily, Janument, and Tresiba 80 units daily. In addition, patient is current prescribed atorvasatin and lisinopril. Per chart review, patient has previously been on Januvia and metformin prior to [place], [place]. Lab Results   Component Value Date    LABA1C 9.6 11/07/2022    LABA1C 7.0 04/14/2022    LABA1C 6.2 12/02/2021     Unable to leave message to discuss referral. Will continue to outreach as appropriate.     Susan Aldridge, PharmD, [facility]  Ambulatory Clinical Pharmacist   [address] [place] [address],[address] Specialty Medication Service  Phone: [phone]  Located within [facility]  Phone: [phone]

## 2022-11-22 NOTE — TELEPHONE ENCOUNTER
Medication Management Service    Date: 11/22/2022  Patient's Name: Demarco Jama YOB: 1985            _____________________________________________________________________________________________    Reached patient to discuss DM management. Patient is agreeable to referral and management.  Patient scheduled 12/6/2022 8:00 am.     Lenora Orta, PharmD, [address],[place] Specialty Medication Service  Phone: [phone]  [facility]  Phone: [phone]    For Pharmacy [address] [place] [address] in place:  No  Recommendation Provided To: Patient/Caregiver: 1 via Telephone  Intervention Detail: Scheduled Appointment  Gap Closed?: No   Intervention Accepted By: Patient/Caregiver: 1  Time Spent (min): 15

## 2022-11-23 DIAGNOSIS — F33.42 RECURRENT MAJOR DEPRESSIVE DISORDER, IN FULL REMISSION (HCC): ICD-10-CM

## 2022-11-23 RX ORDER — SITAGLIPTIN AND METFORMIN HYDROCHLORIDE 100; 1000 MG/1; MG/1
TABLET, FILM COATED, EXTENDED RELEASE ORAL
Qty: 30 TABLET | Refills: 5 | Status: SHIPPED | OUTPATIENT
Start: 2022-11-23

## 2022-11-23 RX ORDER — BUPROPION HYDROCHLORIDE 300 MG/1
TABLET ORAL
Qty: 30 TABLET | Refills: 5 | Status: SHIPPED | OUTPATIENT
Start: 2022-11-23

## 2022-11-23 NOTE — TELEPHONE ENCOUNTER
Mame Horn is calling to request a refill on the following medication(s):    Last Visit Date (If Applicable):  10/6/1887    Next Visit Date:    3/7/2023    Medication Request:  Requested Prescriptions     Pending Prescriptions Disp Refills    JANUMET -1000 MG TB24 [Pharmacy Med Name: JANUMET -1,000 MG TABLET] 30 tablet 5     Sig: TAKE ONE TABLET BY MOUTH DAILY    buPROPion (WELLBUTRIN XL) 300 MG extended release tablet [Pharmacy Med Name: buPROPion HCL  MG TABLET] 30 tablet 5     Sig: TAKE ONE TABLET BY MOUTH DAILY

## 2022-12-06 ENCOUNTER — PHARMACY VISIT (OUTPATIENT)
Dept: FAMILY MEDICINE CLINIC | Age: 37
End: 2022-12-06

## 2022-12-06 VITALS — BODY MASS INDEX: 57.36 KG/M2 | WEIGHT: 293 LBS

## 2022-12-06 DIAGNOSIS — E11.9 CONTROLLED TYPE 2 DIABETES MELLITUS WITHOUT COMPLICATION, WITHOUT LONG-TERM CURRENT USE OF INSULIN (HCC): Primary | ICD-10-CM

## 2022-12-06 PROCEDURE — 99999 PR OFFICE/OUTPT VISIT,PROCEDURE ONLY: CPT | Performed by: PHARMACIST

## 2022-12-06 RX ORDER — DULAGLUTIDE 0.75 MG/.5ML
0.75 INJECTION, SOLUTION SUBCUTANEOUS WEEKLY
Qty: 2 ML | Refills: 0 | Status: SHIPPED | OUTPATIENT
Start: 2022-12-06

## 2022-12-06 RX ORDER — METFORMIN HYDROCHLORIDE 500 MG/1
1000 TABLET, EXTENDED RELEASE ORAL
Qty: 60 TABLET | Refills: 1 | Status: SHIPPED | OUTPATIENT
Start: 2022-12-06

## 2022-12-06 ASSESSMENT — PATIENT HEALTH QUESTIONNAIRE - PHQ9
SUM OF ALL RESPONSES TO PHQ9 QUESTIONS 1 & 2: 2
SUM OF ALL RESPONSES TO PHQ9 QUESTIONS 1 & 2: 2
SUM OF ALL RESPONSES TO PHQ QUESTIONS 1-9: 2
1. LITTLE INTEREST OR PLEASURE IN DOING THINGS: 1
SUM OF ALL RESPONSES TO PHQ QUESTIONS 1-9: 2
1. LITTLE INTEREST OR PLEASURE IN DOING THINGS: SEVERAL DAYS
2. FEELING DOWN, DEPRESSED OR HOPELESS: 1
SUM OF ALL RESPONSES TO PHQ QUESTIONS 1-9: 2
2. FEELING DOWN, DEPRESSED OR HOPELESS: SEVERAL DAYS
SUM OF ALL RESPONSES TO PHQ QUESTIONS 1-9: 2

## 2022-12-06 NOTE — PROGRESS NOTES
Clinical Pharmacy Note    Osmar Spear is a 30 y.o. female, referred to [facility] for medication management by Josefina Dhaliwal MD. Osmar Spear was seen today for diabetes management. Allergies   Allergen Reactions    Penicillins Anaphylaxis and Other (See Comments)        Past Medical History:   Diagnosis Date    Anxiety and depression     Depression     Migraines     Obesity     PONV (postoperative nausea and vomiting)     Seasonal allergies     Sleep apnea     uses cpap nightly    Type 2 diabetes mellitus without complication (HCC)       Social History     Tobacco Use    Smoking status: Former     Packs/day: 0.50     Years: 10.00     Pack years: 5.00     Types: Cigarettes     Quit date:      Years since quittin.9    Smokeless tobacco: Never    Tobacco comments:     quit smoking 4 yrs ago   Substance Use Topics    Alcohol use: Yes     Comment: social     Family History   Problem Relation Age of Onset    High Blood Pressure Mother     Diabetes type 2  Mother     Cancer Maternal Aunt     Breast Cancer Maternal Aunt     Breast Cancer Paternal Aunt     Diabetes Paternal Uncle     Heart Attack Maternal Grandfather     High Blood Pressure Maternal Grandfather     Stroke Maternal Grandfather      REVIEW OF CURRENT DISEASE STATE  Diabetes Mellitus: Patient here for an evaluation of Type 2 diabetes mellitus. Current symptoms/problems include hyperglycemia and polyuria and have been unchanged. Symptoms have been present for 1 year. Interim Update: Patient switched jobs a year ago and transitioned to a more sedentary job. Patient has noticed her BG increase and consequently her A1c increased. Patient stated that she has started working out more and decreasing sugary foods. At last appointment with Dr. Sebas Rdz, patient requested medication to help lose weight and was referred to clinical pharmacy.  Patient had stopped Barnetta Drew a year ago due to increased yeast infections but was restarted at last PCP appointment. DIABETES MEDICATIONS  Current Therapy: Jardiance 10 mg daily, Janument, and Tresiba 80 units daily    Previous Therapy: Januvia and metformin    MEDICATIONS  Current Outpatient Medications   Medication Sig Dispense Refill    Dulaglutide (TRULICITY) 9.07 DE/9.9QD SOPN Inject 0.75 mg into the skin once a week 2 mL 0    metFORMIN (GLUCOPHAGE-XR) 500 MG extended release tablet Take 2 tablets by mouth daily (with breakfast) 60 tablet 1    buPROPion (WELLBUTRIN XL) 300 MG extended release tablet TAKE ONE TABLET BY MOUTH DAILY 30 tablet 5    empagliflozin (JARDIANCE) 10 MG tablet Take 1 tablet by mouth daily 90 tablet 1    TRESIBA FLEXTOUCH 100 UNIT/ML SOPN Inject 80 Units into the skin daily (with breakfast) 10 mL 5    naproxen (NAPROSYN) 500 MG tablet TAKE ONE TABLET BY MOUTH TWICE A DAY WITH FOOD 60 tablet 5    lisinopril (PRINIVIL;ZESTRIL) 5 MG tablet TAKE ONE TABLET BY MOUTH DAILY 30 tablet 3    atorvastatin (LIPITOR) 20 MG tablet TAKE ONE TABLET BY MOUTH DAILY 30 tablet 5    Cholecalciferol (VITAMIN D3) 125 MCG (5000 UT) CAPS TAKE ONE CAPSULE BY MOUTH DAILY 90 capsule 3    Cetirizine HCl (ZYRTEC PO) Take 10 mg by mouth daily      Misc. Devices MISC Cpap tubing, mouth piece and humidifier 1 each 0    Continuous Blood Gluc Sensor (DEXCOM G6 SENSOR) MISC USE AS DIRECTED TO MEASURE BLOOD SUGAR. CHANGE SENSOR EVERY 10 DAYS 3 each 2    [employer] PEN NEEDLES 31G X 6 MM MISC USE ONCE DAILY WITH TRESIBA 100 each 2    Continuous Blood Gluc Transmit (DEXCOM G6 TRANSMITTER) MISC 1 Device by Does not apply route continuous 2 each 1    Continuous Blood Gluc  (DEXCOM G6 ) SONIA 1 Device by Does not apply route continuous 1 each 1     No current facility-administered medications for this visit.      ALLERGIES  Allergies   Allergen Reactions    Penicillins Anaphylaxis and Other (See Comments)      Current Pharmacy:  [facility]  Current testing supplies/frequency: Dexcom G6   Pen needles/syringes: generic    LABS  Diabetes Goals: Using ADA Standards of Care     Goal A1c:  Less than 7 %   Fasting Blood Sugars:  80-130mg/dL  Postprandial glucose:  Less than 180mg/dL     No results found for: BRIDGET [id number]  Lab Results   Component Value Date    LABA1C 9.6 11/07/2022    LABA1C 7.0 04/14/2022    LABA1C 6.2 12/02/2021     Lab Results   Component Value Date    CHOL 145 08/15/2022    TRIG 166 (H) 08/15/2022    HDL 45 08/15/2022    LDLCHOLESTEROL 67 08/15/2022    LDLCALC 138 (A) 01/09/2018     ALT   Date Value Ref Range Status   11/07/2022 54 (H) 5 - 33 U/L Final     AST   Date Value Ref Range Status   11/07/2022 27 <32 U/L Final     Lab Results   Component Value Date    CREATININE 0.76 11/07/2022     Estimated Creatinine Clearance: 147 mL/min (based on SCr of 0.76 mg/dL).   Lab Results   Component Value Date/Time    LABGLOM >60 11/07/2022 08:45 AM    LABGLOM >60 08/15/2022 09:00 AM    LABGLOM >60 02/09/2021 10:17 PM    LABGLOM >60 08/25/2020 12:45 PM    LABGLOM >60 07/25/2019 03:19 PM     VITALS  Wt Readings from Last 3 Encounters:   12/06/22 (!) 323 lb 12.8 oz (146.9 kg)   11/10/22 (!) 327 lb (148.3 kg)   11/07/22 (!) 327 lb 6.4 oz (148.5 kg)     BP Readings from Last 3 Encounters:   11/07/22 118/88   08/15/22 130/80   04/14/22 118/76     Pulse Readings from Last 3 Encounters:   11/07/22 (!) 104   08/15/22 100   04/14/22 97     REVIEW OF SYSTEMS   Recent falls: No  Hyperglycemia: excessive thirst and polyuria   Hypoglycemia: none  Checking feet: Yes    NUTRITION  Breakfast: coffee with cream no sugar, jalapeno and egg burrito   Lunch: varies - leftovers from previous meals, takeout - er[employer], [employer] bagels, or [place]  Dinner: all over the place, cooks a lot at home, spaghetti, burgers, shredded chicken, soups, chili  Snack(s): protein based snacks - protein bars, protein snacks (cheese, nuts, fruit [P3 type], chips (patients struggles with)  Beverage(s): coffee, water with liquid IV, soda twice a week regular (dr bonner)   Alcohol Consumption? Yes - socially 3 times a month  Prior visit with dietician: yes - did not find beneficial    PHYSICAL ACTIVITY  Reactivated y membership - half hour on treadmill typically, 30 minutes on bike, rotation of exercise machines - last time all 3; trying to make 3 days a week (typically W, F and attempting to add M)    [address] in home medications including OTCs: No but had medication list  - Barriers to affording/accessing medications: No  - Uses pillbox/medication organizing method: Yes  - Adhering to current medication regimen: Yes    IMMUNIZATIONS  Immunization History   Administered Date(s) Administered    COVID-19, MODERNA BLUE border, Primary or Immunocompromised, (age 12y+), IM, 100 mcg/0.5mL 10/31/2021, 11/28/2021    Influenza Vaccine, unspecified formulation 11/06/2015    Influenza Virus Vaccine 11/06/2015    Influenza, AFLURIA (age 1 yrs+), FLUZONE, (age 10 mo+), MDV, 0.5mL 09/08/2017    Influenza, FLUCELVAX, (age 10 mo+), MDCK, PF, 0.5mL 12/02/2021, 11/07/2022    Pneumococcal Conjugate 13-valent ([id number]) 09/08/2017    Td (Adult), 5 Lf Tetanus Toxoid, Pf (Tenivac, Decavac) 09/02/2005    Tdap (Boostrix, Adacel) 10/31/2014      Immunizations:   Influenza: complete  PCV20: consider   Shingrix: not due   Hepatitis B series: consider     HEALTH MAINTENANCE  The ASCVD Risk score (Marysol DK, et al., 2019) failed to calculate for the following reasons: The 2019 ASCVD risk score is only valid for ages 36 to 78  Cardiovascular risk factors: diabetes mellitus, dyslipidemia, obesity (BMI >= 30 kg/m2), and sedentary lifestyle  Aspirin: No  Statin: Yes atorvastatin  ACEi/ARB: Yes lisinopril  Nicotine use: No quit in 2018    DIABETIC COMPLICATIONS  Retinopathy: No   Neuropathy: No   Nephropathy: No Last Microalbumin: 30    ASSESSMENT/PLAN  Diabetes mellitus Type II, under inadequate control.  - Patient's A1c goal is <7%.  Patient's A1c is not at goal.   - Current medications include Elgie Aures, and Tresiba. Adherence is Very Good  - Patient's testing blood glucose throughout the day with her Dexcom meter. Current blood glucose trend around the low 821D  - Start Trulicity 0.7 mg weekly and metformin 1000 mg daily  - Continue Tresiba 40 units BID  - Stop Janumet  - Needed lab monitoring includes none at this time  - Reviewed and provided resource discussing s/sx of hypoglycemia and management.  - Bring glucometer/log/CGM reader to all future appointments. - Patient asked if probiotics might help with yeast infections. Discussed use of probiotics and SLGT2 inihibitors and lack of studies. No harm should come if patient decided to take probiotics but would be beneficial for patient to stay hydrated and practice proper hygiene to help prevent yeast infections due to SGLT2  Hypertension  - Per 2017 ACC/AHA blood pressure goal is <130/80. Patient is  at goal.   - Current medications include lisinopril. Adherence is Very Good  - Continue lisinopril 5 mg daily  Cholesterol  - LDL is 67  - Current medications include atorvastatine. Adherence is Very Good  - Discussed diabetes as a high risk factor of ASCVD. - Continue atorvastatin 20 mg daily  Nutrition/Lifestyle Modifications  - Patient's diet not a diabetic diet  - Encouraged reading nutrition labels and focusing on limiting carbohydrate intake to 45-60 grams/meal and 15 grams/snack; at least 1 serving of protein/meal  - Recommend ~30 minutes consistent, moderately intensive, exercise/day or ~150 minutes/week. Encouraged patient - to start small, stay consistent, and increase length and types of exercise as tolerated. - Discussed goal of losing ~7% of current body weight. Goal weight is 300 lbs. based on today's weight. Patient goals for next appointment  Start Trulicity 2.86 mg once a week for 4 weeks once Janumet is finished.  At that time, stop Janumet and begin metformin 500 mg taking 2 every morning. Continue taking Tresiba 40 units twice a day    Continue to increase exercise to 150 minutes a week with adding in strength training in addition to cardio. Begin utilizing the plate method to plan meals with half your plate being non starchy vegetables, a quarter of your plate being some sort of starch, and a quarter of your plate being protein. Patient verbalized understanding of the information presented and all of the patient's questions were answered. AVS was handed to the patient. Patient advised to call the office with any additional questions or concerns. Return to clinic in 4 week (s) for follow up.      Thank you for the consult,     Michelle Lutz, PharmD, [address],[place] Specialty Medication Service  Phone: [phone]  [facility]  Phone: [phone] option [phone] in place:  Yes  Recommendation Provided To: Patient/Caregiver: 4 via In person  Intervention Detail: Discontinued Rx: 1, reason: Duplicate Therapy, New Rx: 2, reason: Needs Additional Therapy, and Scheduled Appointment  Gap Closed?: No   Intervention Accepted By: Patient/Caregiver: 4  Time Spent (min):  120

## 2022-12-06 NOTE — PATIENT INSTRUCTIONS
Start Trulicity 7.34 mg once a week for 4 weeks once Janumet is finished. At that time, stop Janumet and begin metformin 500 mg taking 2 every morning. Continue taking Tresiba 40 units twice a day    Continue to increase exercise to 150 minutes a week with adding in strength training in addition to cardio. Begin utilizing the plate method to plan meals with half your plate being non starchy vegetables, a quarter of your plate being some sort of starch, and a quarter of your plate being protein. Follow up with pharmacist in 4 weeks.

## 2022-12-09 DIAGNOSIS — E11.9 CONTROLLED TYPE 2 DIABETES MELLITUS WITHOUT COMPLICATION, WITHOUT LONG-TERM CURRENT USE OF INSULIN (HCC): ICD-10-CM

## 2022-12-09 RX ORDER — LISINOPRIL 5 MG/1
TABLET ORAL
Qty: 30 TABLET | Refills: 5 | Status: SHIPPED | OUTPATIENT
Start: 2022-12-09

## 2022-12-24 DIAGNOSIS — E78.2 MIXED HYPERLIPIDEMIA: ICD-10-CM

## 2022-12-27 RX ORDER — ATORVASTATIN CALCIUM 20 MG/1
TABLET, FILM COATED ORAL
Qty: 30 TABLET | Refills: 5 | Status: SHIPPED | OUTPATIENT
Start: 2022-12-27

## 2023-01-03 DIAGNOSIS — E11.9 CONTROLLED TYPE 2 DIABETES MELLITUS WITHOUT COMPLICATION, WITHOUT LONG-TERM CURRENT USE OF INSULIN (HCC): ICD-10-CM

## 2023-01-03 RX ORDER — DULAGLUTIDE 0.75 MG/.5ML
INJECTION, SOLUTION SUBCUTANEOUS
Qty: 2 ML | Refills: 5 | Status: SHIPPED | OUTPATIENT
Start: 2023-01-03

## 2023-01-03 NOTE — PROGRESS NOTES
Clinical Pharmacy Note    Tim Singh is a 30 y.o. female, referred to [facility] for medication management by Jason Baxter MD. Tim Singh was seen today for diabetes management. Allergies   Allergen Reactions    Penicillins Anaphylaxis and Other (See Comments)        Past Medical History:   Diagnosis Date    Anxiety and depression     Depression     Migraines     Obesity     PONV (postoperative nausea and vomiting)     Seasonal allergies     Sleep apnea     uses cpap nightly    Type 2 diabetes mellitus without complication (HCC)       Social History     Tobacco Use    Smoking status: Former     Packs/day: 0.50     Years: 10.00     Pack years: 5.00     Types: Cigarettes     Quit date:      Years since quittin.0    Smokeless tobacco: Never    Tobacco comments:     quit smoking 4 yrs ago   Substance Use Topics    Alcohol use: Yes     Comment: social     Family History   Problem Relation Age of Onset    High Blood Pressure Mother     Diabetes type 2  Mother     Cancer Maternal Aunt     Breast Cancer Maternal Aunt     Breast Cancer Paternal Aunt     Diabetes Paternal Uncle     Heart Attack Maternal Grandfather     High Blood Pressure Maternal Grandfather     Stroke Maternal Grandfather      REVIEW OF CURRENT DISEASE STATE  Diabetes Mellitus: Patient here for an evaluation of Type 2 diabetes mellitus. Current symptoms/problems include hyperglycemia and polyuria and have been unchanged. Symptoms have been present for 1 year. Interim Update: Patient last seen by clinical pharmacist 2022. Patient was referred to help control A1c and wishing for medication to assist in weight loss. Patient started [facility] had began working out with  at the [facility] and trying to decrease her sugary food consumption. Since last appointment, patient has lost 10.5 pounds.  Patient has been incorporating more vegetables into diet and attempting to work out 3 days a week (MW). Patient has started her Trulicity and finished the first 4 weeks. Patient has been experiencing mild GI side effects on day 2 after injection. Patient has had mild itching since restarting Jardiance but is tolerable. Patient reported only taking metformin 500 mg once a day in the morning rather than 1000 mg once a day. Patient also typically only taking 40 units of Tresiba in the morning due to bottoming out at bedtime. DIABETES MEDICATIONS  Current Therapy: Jardiance 10 mg, metformin 1000 mg daily, Tresiba 40 units BID    Previous Therapy: Januvia, Janumet    MEDICATIONS  Current Outpatient Medications   Medication Sig Dispense Refill    TRULICITY 8.34 AM/4.7VZ SOPN INJECT 0.75 MG UNDER THE SKIN ONCE WEEKLY 2 mL 5    atorvastatin (LIPITOR) 20 MG tablet TAKE ONE TABLET BY MOUTH DAILY 30 tablet 5    lisinopril (PRINIVIL;ZESTRIL) 5 MG tablet TAKE ONE TABLET BY MOUTH DAILY 30 tablet 5    metFORMIN (GLUCOPHAGE-XR) 500 MG extended release tablet Take 2 tablets by mouth daily (with breakfast) 60 tablet 1    buPROPion (WELLBUTRIN XL) 300 MG extended release tablet TAKE ONE TABLET BY MOUTH DAILY 30 tablet 5    empagliflozin (JARDIANCE) 10 MG tablet Take 1 tablet by mouth daily 90 tablet 1    TRESIBA FLEXTOUCH 100 UNIT/ML SOPN Inject 80 Units into the skin daily (with breakfast) 10 mL 5    Misc. Devices MISC Cpap tubing, mouth piece and humidifier 1 each 0    Continuous Blood Gluc Sensor (DEXCOM G6 SENSOR) MISC USE AS DIRECTED TO MEASURE BLOOD SUGAR.  CHANGE SENSOR EVERY 10 DAYS 3 each 2    naproxen (NAPROSYN) 500 MG tablet TAKE ONE TABLET BY MOUTH TWICE A DAY WITH FOOD 60 tablet 5    [employer] PEN NEEDLES 31G X 6 MM MISC USE ONCE DAILY WITH TRESIBA 100 each 2    Cholecalciferol (VITAMIN D3) 125 MCG (5000 UT) CAPS TAKE ONE CAPSULE BY MOUTH DAILY 90 capsule 3    Continuous Blood Gluc Transmit (DEXCOM G6 TRANSMITTER) MISC 1 Device by Does not apply route continuous 2 each 1    Continuous Blood Gluc  (DEXCOM G6 ) SONIA 1 Device by Does not apply route continuous 1 each 1    Cetirizine HCl (ZYRTEC PO) Take 10 mg by mouth daily       No current facility-administered medications for this visit. ALLERGIES  Allergies   Allergen Reactions    Penicillins Anaphylaxis and Other (See Comments)      [facility]  Current testing supplies/frequency: Dexcom G6   Pen needles/syringes: generic    LABS  Diabetes Goals: Using ADA Standards of Care     Goal A1c:  Less than 7%   Fasting Blood Sugars:   mg/dL  Postprandial glucose:  Less than 180 mg/dL     No results found for: LABMICR, [id number]  Lab Results   Component Value Date    LABA1C 9.6 11/07/2022    LABA1C 7.0 04/14/2022    LABA1C 6.2 12/02/2021     Lab Results   Component Value Date    CHOL 145 08/15/2022    TRIG 166 (H) 08/15/2022    HDL 45 08/15/2022    LDLCHOLESTEROL 67 08/15/2022    LDLCALC 138 (A) 01/09/2018     ALT   Date Value Ref Range Status   11/07/2022 54 (H) 5 - 33 U/L Final     AST   Date Value Ref Range Status   11/07/2022 27 <32 U/L Final     Lab Results   Component Value Date    CREATININE 0.76 11/07/2022     CrCl cannot be calculated (Unknown ideal weight.).   Lab Results   Component Value Date/Time    LABGLOM >60 11/07/2022 08:45 AM    LABGLOM >60 08/15/2022 09:00 AM    LABGLOM >60 02/09/2021 10:17 PM    LABGLOM >60 08/25/2020 12:45 PM    LABGLOM >60 07/25/2019 03:19 PM     VITALS  Wt Readings from Last 3 Encounters:   12/06/22 (!) 323 lb 12.8 oz (146.9 kg)   11/10/22 (!) 327 lb (148.3 kg)   11/07/22 (!) 327 lb 6.4 oz (148.5 kg)     BP Readings from Last 3 Encounters:   11/07/22 118/88   08/15/22 130/80   04/14/22 118/76     Pulse Readings from Last 3 Encounters:   11/07/22 (!) 104   08/15/22 100   04/14/22 97     REVIEW OF SYSTEMS   Recent falls: No  Hyperglycemia: excessive thirst and polyuria   Hypoglycemia: none  Checking feet: Yes    NUTRITION  Breakfast: coffee with cream no sugar, jalapeno and egg burrito Lunch: varies - leftovers from previous meals, takeout - erwin miller, sherrie bagels, or [place]  Dinner: all over the place, cooks a lot at home, spaghetti, burgers, shredded chicken, soups, chili - increasing vegetables at meals; subbing spaghetti squash for pasta  Snack(s): protein based snacks - protein bars, protein snacks (cheese, nuts, fruit [P3 type], chips (patients struggles with)  Beverage(s): coffee, water with liquid IV, soda twice a week regular (dr bonner) now drinking diet dr bonner  Alcohol Consumption? Yes - socially 3 times a month  Prior visit with dietician: yes - did not find beneficial    PHYSICAL ACTIVITY  Reactivated y membership - half hour on treadmill typically, 30 minutes on bike, rotation of exercise machines - last time all 3; trying to make 3 days a week (typically W, F and attempting to add M) - been going twice a week (tries to not miss more than 4 days but trying to do MWF)    [address] in home medications including OTCs: No  - Barriers to affording/accessing medications: No  - Uses pillbox/medication organizing method: Yes  - Adhering to current medication regimen: Yes    IMMUNIZATIONS  Immunization History   Administered Date(s) Administered    COVID-19, MODERNA BLUE border, Primary or Immunocompromised, (age 12y+), IM, 100 mcg/0.5mL 10/31/2021, 11/28/2021    Influenza Vaccine, unspecified formulation 11/06/2015    Influenza Virus Vaccine 11/06/2015    Influenza, AFLURIA (age 1 yrs+), FLUZONE, (age 10 mo+), MDV, 0.5mL 09/08/2017    Influenza, FLUCELVAX, (age 10 mo+), MDCK, PF, 0.5mL 12/02/2021, 11/07/2022    Pneumococcal Conjugate 13-valent ([id number]) 09/08/2017    Td (Adult), 5 Lf Tetanus Toxoid, Pf (Tenivac, Decavac) 09/02/2005    Tdap (Boostrix, Adacel) 10/31/2014      HEALTH MAINTENANCE  The ASCVD Risk score (Marysol LAWLER, et al., 2019) failed to calculate for the following reasons:     The 2019 ASCVD risk score is only valid for ages 36 to 78  Cardiovascular risk factors: diabetes mellitus, dyslipidemia, obesity (BMI >= 30 kg/m2), and sedentary lifestyle  Aspirin: No  Statin: Yes atorvastatin  ACEi/ARB: Yes lisinopril  Nicotine use: No quit in 2018    ASSESSMENT/PLAN  Diabetes mellitus Type II, under inadequate control.  - Patient's A1c goal is <7%%. Patient's A1c is not at goal.   - Current medications include Jardiance, Trulicity, metformin, and Tresiba. Adherence is Fair - patient only taking 500 mg metformin and 40 units of Tresiba  - Patient's testing blood glucose via Dexcom. Current blood glucose trend relatively in goal, occasional spikes outside of range  - Start metformin 1000 mg daily, Tresiba 45 units AM and 20 units PM  - Continue Jardiance 10 mg daily and Trulicity 4.25 mg weekly  - Needed lab monitoring includes none at this time  - Reviewed and provided resource discussing s/sx of hypoglycemia and management.  - Bring glucometer/log/CGM reader to all future appointments. - Discussed with patient increasing Trulicity at next appointment and increasing Jardiance once A1c is a little better controlled. Patient is agreeable to increases in future but wanting to hold off on increasing Trulicity at this appointment  - Treatment of hypoglycemia (low blood sugars): if sugar is below 70 mg/dL, treat with 15 grams of simple sugar [rapid acting carb] (3-4 glucose tablets, 4 oz of regular juice, 1/2 can of pop, 5 sugar-containing candies, 1 Tablespoon of honey). RECHECK in 15 minutes. If above 70 mg/dL, have small meal or snack. If not above 70 mg/dL, repeat the 15 grams of simple carbs until above 70 mg/dL. Hypertension  - Per 2017 ACC/AHA blood pressure goal is <130/80. Patient is  at goal.   - Current medications include lisinopril. Adherence is Good  - Continue lisinopril 5 mg daily  Cholesterol  - LDL is 67  - Current medications include atorvastatine. Adherence is Very Good  - Discussed diabetes as a high risk factor of ASCVD.    - Continue atorvastatin 20 mg daily  Nutrition/Lifestyle Modifications  - Patient's diet is improving towards a diabetic diet. Patient is attempting to limit carbs and increase vegetables at meals. Patient also cut out all regular soda and now drinking diet soda and water  - Encouraged reading nutrition labels and focusing on limiting carbohydrate intake to 45-60 grams/meal and 15 grams/snack; at least 1 serving of protein/meal  - Recommend ~30 minutes consistent, moderately intensive, exercise/day or ~150 minutes/week. Encouraged patient - to start small, stay consistent, and increase length and types of exercise as tolerated. - Discussed goal of losing ~7% of current body weight. Goal weight is 300 lbs. based on previous appointment weight. Patient currently at 313 pounds (down 10 pounds). Patient goals for next appointment  Continue Trulicity 8.45 mg once a week and Jardiance 10 mg once daily. Begin taking metformin 500 mg tablets, 2 tablets in the morning. Start taking Tresiba 45 units in the morning and 20 units at bedtime. Patient verbalized understanding of the information presented and all of the patient's questions were answered. AVS was handed to the patient. Patient advised to call the office with any additional questions or concerns. Return to clinic in 4 week (s) for follow up.      Thank you for the consult,     Fabiano Burrell, PharmD, [address],[facility]  Phone: [phone]  [address] [place] [address] Medicine  Phone: [phone] option Senthil Timbo [address] Only    Program: Medical Group  CPA in place:  Yes  Recommendation Provided To: Patient/Caregiver: 4 via In person  Intervention Detail: Adherence Monitorin, Dose Adjustment: 1, reason: Therapy Optimization, and Scheduled Appointment  Intervention Accepted By: Patient/Caregiver: 4  Gap Closed?: No   Time Spent (min):  90

## 2023-01-05 ENCOUNTER — PHARMACY VISIT (OUTPATIENT)
Dept: FAMILY MEDICINE CLINIC | Age: 38
End: 2023-01-05

## 2023-01-05 VITALS
WEIGHT: 293 LBS | DIASTOLIC BLOOD PRESSURE: 82 MMHG | HEART RATE: 86 BPM | SYSTOLIC BLOOD PRESSURE: 120 MMHG | BODY MASS INDEX: 55.48 KG/M2

## 2023-01-05 DIAGNOSIS — Z79.4 TYPE 2 DIABETES MELLITUS WITHOUT COMPLICATION, WITH LONG-TERM CURRENT USE OF INSULIN (HCC): Primary | ICD-10-CM

## 2023-01-05 DIAGNOSIS — E11.9 TYPE 2 DIABETES MELLITUS WITHOUT COMPLICATION, WITH LONG-TERM CURRENT USE OF INSULIN (HCC): Primary | ICD-10-CM

## 2023-01-05 PROCEDURE — 99999 PR OFFICE/OUTPT VISIT,PROCEDURE ONLY: CPT | Performed by: PHARMACIST

## 2023-01-05 ASSESSMENT — PATIENT HEALTH QUESTIONNAIRE - PHQ9
SUM OF ALL RESPONSES TO PHQ QUESTIONS 1-9: 0
SUM OF ALL RESPONSES TO PHQ9 QUESTIONS 1 & 2: 0
2. FEELING DOWN, DEPRESSED OR HOPELESS: 0
SUM OF ALL RESPONSES TO PHQ QUESTIONS 1-9: 0
SUM OF ALL RESPONSES TO PHQ QUESTIONS 1-9: 0
1. LITTLE INTEREST OR PLEASURE IN DOING THINGS: 0
SUM OF ALL RESPONSES TO PHQ QUESTIONS 1-9: 0

## 2023-01-05 NOTE — PATIENT INSTRUCTIONS
Continue Trulicity 6.25 mg once a week and Jardiance 10 mg once daily. Begin taking metformin 500 mg tablets, 2 tablets in the morning. Start taking Tresiba 45 units in the morning and 20 units at bedtime.

## 2023-01-08 DIAGNOSIS — E11.9 CONTROLLED TYPE 2 DIABETES MELLITUS WITHOUT COMPLICATION, WITHOUT LONG-TERM CURRENT USE OF INSULIN (HCC): ICD-10-CM

## 2023-01-09 RX ORDER — BLOOD-GLUCOSE SENSOR
EACH MISCELLANEOUS
Qty: 3 EACH | Refills: 2 | Status: SHIPPED | OUTPATIENT
Start: 2023-01-09

## 2023-01-09 NOTE — TELEPHONE ENCOUNTER
Pearson Mcardle is calling to request a refill on the following medication(s):    Last Visit Date (If Applicable):  67/8/2065    Next Visit Date:    3/7/2023    Medication Request:  Requested Prescriptions     Pending Prescriptions Disp Refills    Continuous Blood Gluc Sensor (DEXCOM G6 SENSOR) MISC [Pharmacy Med Name: DEXCOM G6 SENSOR] 3 each 2     Sig: USE AS DIRECTED TO MEASURE BLOOD SUGAR, CHANGE EVERY 10 DAYS

## 2023-01-24 DIAGNOSIS — E11.9 CONTROLLED TYPE 2 DIABETES MELLITUS WITHOUT COMPLICATION, WITHOUT LONG-TERM CURRENT USE OF INSULIN (HCC): ICD-10-CM

## 2023-01-24 RX ORDER — BLOOD-GLUCOSE TRANSMITTER
EACH MISCELLANEOUS
Qty: 1 EACH | Refills: 0 | Status: SHIPPED | OUTPATIENT
Start: 2023-01-24

## 2023-01-24 NOTE — TELEPHONE ENCOUNTER
Marquis Blackwood is calling to request a refill on the following medication(s):    Last Visit Date (If Applicable):  58/5/9922    Next Visit Date:    3/7/2023    Medication Request:  Requested Prescriptions     Pending Prescriptions Disp Refills    Continuous Blood Gluc Transmit (DEXCOM G6 TRANSMITTER) MISC [Pharmacy Med Name: DEXCOM G6 TRANSMITTER] 1 each 0     Sig: USE AS DIRECTED

## 2023-02-01 DIAGNOSIS — E55.9 VITAMIN D DEFICIENCY: ICD-10-CM

## 2023-02-01 NOTE — TELEPHONE ENCOUNTER
Cr Gutierrez is calling to request a refill on the following medication(s):    Last Visit Date (If Applicable):  43/8/9024    Next Visit Date:    3/7/2023    Medication Request:  Requested Prescriptions     Pending Prescriptions Disp Refills    Cholecalciferol (VITAMIN D3) 125 MCG (5000 UT) CAPS [Pharmacy Med Name: VITAMIN D3 125 MCG CAPSULE] 30 capsule 1     Sig: TAKE ONE CAPSULE BY MOUTH DAILY

## 2023-02-02 ENCOUNTER — PHARMACY VISIT (OUTPATIENT)
Dept: FAMILY MEDICINE CLINIC | Age: 38
End: 2023-02-02

## 2023-02-02 VITALS
WEIGHT: 293 LBS | SYSTOLIC BLOOD PRESSURE: 106 MMHG | DIASTOLIC BLOOD PRESSURE: 66 MMHG | BODY MASS INDEX: 54.38 KG/M2 | HEART RATE: 84 BPM

## 2023-02-02 DIAGNOSIS — E11.9 TYPE 2 DIABETES MELLITUS WITHOUT COMPLICATION, WITH LONG-TERM CURRENT USE OF INSULIN (HCC): Primary | ICD-10-CM

## 2023-02-02 DIAGNOSIS — Z79.4 TYPE 2 DIABETES MELLITUS WITHOUT COMPLICATION, WITH LONG-TERM CURRENT USE OF INSULIN (HCC): Primary | ICD-10-CM

## 2023-02-02 PROCEDURE — 99999 PR OFFICE/OUTPT VISIT,PROCEDURE ONLY: CPT | Performed by: PHARMACIST

## 2023-02-02 NOTE — PROGRESS NOTES
Clinical Pharmacy Note    Jaison Espino is a 30 y.o. female, referred to [facility] for medication management by Armond Gorman MD. Jaison Espino was seen today for diabetes management. More n/v than usual  No normal BM in a couple months goes from loose stools to tarry stools  Only taking 2 night doses of PM   Sugars within range with new sensor  Allergies   Allergen Reactions    Penicillins Anaphylaxis and Other (See Comments)        Past Medical History:   Diagnosis Date    Anxiety and depression     Depression     Migraines     Obesity     PONV (postoperative nausea and vomiting)     Seasonal allergies     Sleep apnea     uses cpap nightly    Type 2 diabetes mellitus without complication (HCC)       Social History     Tobacco Use    Smoking status: Former     Packs/day: 0.50     Years: 10.00     Pack years: 5.00     Types: Cigarettes     Quit date:      Years since quittin.0    Smokeless tobacco: Never    Tobacco comments:     quit smoking 4 yrs ago   Substance Use Topics    Alcohol use: Yes     Comment: social     Family History   Problem Relation Age of Onset    High Blood Pressure Mother     Diabetes type 2  Mother     Cancer Maternal Aunt     Breast Cancer Maternal Aunt     Breast Cancer Paternal Aunt     Diabetes Paternal Uncle     Heart Attack Maternal Grandfather     High Blood Pressure Maternal Grandfather     Stroke Maternal Grandfather      REVIEW OF CURRENT DISEASE STATE  Diabetes Mellitus: Patient here for an evaluation of Type 2 diabetes mellitus. Current symptoms/problems include hyperglycemia and polyuria and have been unchanged. Symptoms have been present for 1 year. Interim Update: Patient last seen by clinical pharmacist 2022. Patient was referred to help control A1c and wishing for medication to assist in weight loss. Patient has continued with her Trulicity, metformin, and Jardiance.  Patient has continued working out with  at the [facility] and has decreased her sugary food consumption. Since referral appointment appointment, patient has lost 20.5 pounds. Patient has been incorporating more vegetables into diet and attempting to work out 3 days a week (MWF). Patient has increased Lee Dory to 45 units in the morning but has not taken evening dose as frequently (twice out of past 4 weeks). DIABETES MEDICATIONS  Current Therapy: Tresiba, Jardiance, Trulicity, metformin    Previous Therapy: Januvia, Janumet    MEDICATIONS  Current Outpatient Medications   Medication Sig Dispense Refill    Cholecalciferol (VITAMIN D3) 125 MCG (5000 UT) CAPS TAKE ONE CAPSULE BY MOUTH DAILY 30 capsule 1    Continuous Blood Gluc Transmit (DEXCOM G6 TRANSMITTER) MISC USE AS DIRECTED 1 each 0    Continuous Blood Gluc Sensor (DEXCOM G6 SENSOR) MISC USE AS DIRECTED TO MEASURE BLOOD SUGAR, CHANGE EVERY 10 DAYS 3 each 2    TRULICITY 7.34 VU/7.9UP SOPN INJECT 0.75 MG UNDER THE SKIN ONCE WEEKLY 2 mL 5    atorvastatin (LIPITOR) 20 MG tablet TAKE ONE TABLET BY MOUTH DAILY 30 tablet 5    lisinopril (PRINIVIL;ZESTRIL) 5 MG tablet TAKE ONE TABLET BY MOUTH DAILY 30 tablet 5    metFORMIN (GLUCOPHAGE-XR) 500 MG extended release tablet Take 2 tablets by mouth daily (with breakfast) 60 tablet 1    buPROPion (WELLBUTRIN XL) 300 MG extended release tablet TAKE ONE TABLET BY MOUTH DAILY 30 tablet 5    empagliflozin (JARDIANCE) 10 MG tablet Take 1 tablet by mouth daily 90 tablet 1    TRESIBA FLEXTOUCH 100 UNIT/ML SOPN Inject 80 Units into the skin daily (with breakfast) 10 mL 5    Misc.  Devices MISC Cpap tubing, mouth piece and humidifier 1 each 0    naproxen (NAPROSYN) 500 MG tablet TAKE ONE TABLET BY MOUTH TWICE A DAY WITH FOOD 60 tablet 5    [employer] PEN NEEDLES 31G X 6 MM MISC USE ONCE DAILY WITH TRESIBA 100 each 2    Continuous Blood Gluc  (DEXCOM G6 ) SONIA 1 Device by Does not apply route continuous 1 each 1    Cetirizine HCl (ZYRTEC PO) Take 10 mg by mouth daily       No current facility-administered medications for this visit. ALLERGIES  Allergies   Allergen Reactions    Penicillins Anaphylaxis and Other (See Comments)      Current [facility]: [facility]  Current testing supplies/frequency: Dexcom G6   Pen needles/syringes: generic    LABS  Diabetes Goals: Using ADA Standards of Care     Goal A1c:  Less than 7%   Fasting Blood Sugars:  80-130mg/dL  Postprandial glucose:  Less than 180mg/dL     No results found for: LABMICR, [id number]  Lab Results   Component Value Date    LABA1C 9.6 11/07/2022    LABA1C 7.0 04/14/2022    LABA1C 6.2 12/02/2021     Lab Results   Component Value Date    CHOL 145 08/15/2022    TRIG 166 (H) 08/15/2022    HDL 45 08/15/2022    LDLCHOLESTEROL 67 08/15/2022    LDLCALC 138 (A) 01/09/2018     ALT   Date Value Ref Range Status   11/07/2022 54 (H) 5 - 33 U/L Final     AST   Date Value Ref Range Status   11/07/2022 27 <32 U/L Final     Lab Results   Component Value Date    CREATININE 0.76 11/07/2022     Estimated Creatinine Clearance: 142 mL/min (based on SCr of 0.76 mg/dL).   Lab Results   Component Value Date/Time    LABGLOM >60 11/07/2022 08:45 AM    LABGLOM >60 08/15/2022 09:00 AM    LABGLOM >60 02/09/2021 10:17 PM    LABGLOM >60 08/25/2020 12:45 PM    LABGLOM >60 07/25/2019 03:19 PM     VITALS  Wt Readings from Last 3 Encounters:   02/02/23 (!) 307 lb (139.3 kg)   01/05/23 (!) 313 lb 3.2 oz (142.1 kg)   12/06/22 (!) 323 lb 12.8 oz (146.9 kg)     BP Readings from Last 3 Encounters:   02/02/23 106/66   01/05/23 120/82   11/07/22 118/88     Pulse Readings from Last 3 Encounters:   02/02/23 84   01/05/23 86   11/07/22 (!) 104     REVIEW OF SYSTEMS   Recent falls: No  Hyperglycemia: none   Hypoglycemia: none  Checking feet: Yes    NUTRITION  Breakfast: coffee with cream no sugar, jalapeno and egg burrito   Lunch: varies - leftovers from previous meals, takeout - erwin miller, sherrie mccauley, or [place]  Dinner: all over the place, cooks a lot at home, jelena, marlin, shredded chicken, soups, chili - increasing vegetables at meals; subbing spaghetti squash for pasta  Snack(s): protein based snacks - protein bars, protein snacks (cheese, nuts, fruit [P3 type], chips (patients struggles with)  Beverage(s): coffee, water with liquid IV, soda twice a week regular (dr bonner) now drinking diet dr bonner  Alcohol Consumption? Yes - socially 3 times a month  Prior visit with dietician: yes - did not find beneficial    PHYSICAL ACTIVITY  Reactivated y membership - half hour on treadmill typically, 30 minutes on bike, rotation of exercise machines - last time all 3; trying to make 3 days a week (typically W, F and attempting to add M)    [address] in home medications including OTCs: No  - Barriers to affording/accessing medications: No  - Uses pillbox/medication organizing method: Yes  - Adhering to current medication regimen: Yes    IMMUNIZATIONS  Immunization History   Administered Date(s) Administered    COVID-19, MODERNA BLUE border, Primary or Immunocompromised, (age 12y+), IM, 100 mcg/0.5mL 10/31/2021, 11/28/2021    Influenza Vaccine, unspecified formulation 11/06/2015    Influenza Virus Vaccine 11/06/2015    Influenza, AFLURIA (age 1 yrs+), FLUZONE, (age 10 mo+), MDV, 0.5mL 09/08/2017    Influenza, FLUCELVAX, (age 10 mo+), MDCK, PF, 0.5mL 12/02/2021, 11/07/2022    Pneumococcal Conjugate 13-valent ([id number]) 09/08/2017    Td (Adult), 5 Lf Tetanus Toxoid, Pf (Tenivac, Decavac) 09/02/2005    Tdap (Boostrix, Adacel) 10/31/2014      Immunizations:   Influenza: complete  PCV20: consider   Shingrix: not due   Hepatitis B series: consider     HEALTH MAINTENANCE  The ASCVD Risk score (Marysol LAWLER, et al., 2019) failed to calculate for the following reasons:     The 2019 ASCVD risk score is only valid for ages 36 to 78  Cardiovascular risk factors: diabetes mellitus, dyslipidemia, obesity (BMI >= 30 kg/m2), and sedentary lifestyle  Aspirin: No  Statin: Yes atorvastatin  ACEi/ARB: Yes lisinopril  Nicotine use: No     ASSESSMENT/PLAN  Diabetes mellitus Type II, under inadequate control.  - Patient's A1c goal is <7%. Patient's A1c is not at goal.   - Current medications include Jardiance, Trulicity, metformin, and Tresiba. Adherence is Fair; patient not taking evening dose of Tresiba  - Patient's testing blood glucose via Dexcom. Current blood glucose trend appears to be in range per Dex  - Continue Tresiba, Trulicity, Jardiance, and metformin  - Needed lab monitoring includes none at this time  - Reviewed and provided resource discussing s/sx of hypoglycemia and management.  - Bring glucometer/log/CGM reader to all future appointments. Hypertension  - Per 2017 ACC/AHA blood pressure goal is <130/80. Patient is  at goal.   - Current medications include lisinopril. Adherence is Very Good  - Continue lisinopril  Cholesterol  - LDL is 67  - Current medications include atorvastatin. Adherence is Good  - Discussed diabetes as a high risk factor of ASCVD. - Continue atorvastatin  Nutrition/Lifestyle Modifications  - Patient's diet has been improving and transitioning towards a diabetic diet. Patient has been decreasing carbs and increasing vegetables with each meal.  - Encouraged reading nutrition labels and focusing on limiting carbohydrate intake to 45-60 grams/meal and 15 grams/snack; at least 1 serving of protein/meal  - Recommend ~30 minutes consistent, moderately intensive, exercise/day or ~150 minutes/week. Encouraged patient - to start small, stay consistent, and increase length and types of exercise as tolerated. - Discussed goal of losing ~7% of current body weight. Goal weight is 304 lbs. based on initial weight. Patient goals for next appointment  Continue Trulicity 0.56 mg once a week, metformin 1000 mg daily, Tresiba 45 units in the morning and 20 units in the evening, and Jardiance 10 mg once daily.     Patient verbalized understanding of the information presented and all of the patient's questions were answered. AVS was handed to the patient. Patient advised to call the office with any additional questions or concerns. Return to clinic in 4 week (s) for follow up.      Thank you for the consult,     Jonatan Eagle, PharmD, [address],[facility]  Phone: [phone]  [address] [place] [address]  Phone: [phone] option Senthil Izquierdo [address] Only    Program: Medical Group  CPA in place:  Yes  Recommendation Provided To: Patient/Caregiver: 1 via In person  Intervention Detail: Scheduled Appointment  Intervention Accepted By: Patient/Caregiver: 1  Gap Closed?: no   Time Spent (min):  90

## 2023-02-02 NOTE — PATIENT INSTRUCTIONS
Continue Trulicity 3.99 mg once a week, metformin 1000 mg daily, Tresiba 45 units in the morning and 20 units in the evening, and Jardiance 10 mg once daily.

## 2023-02-04 DIAGNOSIS — E11.9 CONTROLLED TYPE 2 DIABETES MELLITUS WITHOUT COMPLICATION, WITHOUT LONG-TERM CURRENT USE OF INSULIN (HCC): ICD-10-CM

## 2023-02-06 RX ORDER — METFORMIN HYDROCHLORIDE 500 MG/1
TABLET, EXTENDED RELEASE ORAL
Qty: 60 TABLET | Refills: 1 | Status: SHIPPED | OUTPATIENT
Start: 2023-02-06

## 2023-02-27 ENCOUNTER — TELEPHONE (OUTPATIENT)
Dept: FAMILY MEDICINE CLINIC | Age: 38
End: 2023-02-27

## 2023-02-27 NOTE — TELEPHONE ENCOUNTER
Medication Management Service    Date: 2/27/2023  Patient's Name: Jordan Jacobs YOB: 1985            _____________________________________________________________________________________________    Left message to reschedule appointment. Availability for 2/28/2023 at 8:00 if patient is able to make appointment. Will continue to outreach as appropriate.     Dakotah Ugarte, PharmD, [facility]  Ambulatory Clinical Pharmacist   [address] [place] [address],[address] Specialty Medication Service  Phone: [phone]  [facility]  Phone: [phone] option 1

## 2023-03-06 NOTE — TELEPHONE ENCOUNTER
Medication Management Service    Date: 3/6/2023  Patient's Name: Kenya Verde YOB: 1985            _____________________________________________________________________________________________    Patient rescheduled for 3/7/2023.     Waqar Salgado, PharmD, [address],[place] Specialty Medication Service  Phone: [phone]  [address] [place] [address]  Phone: [phone] [place] [address] Only    Program: Medical Group  CPA in place:  Yes  Recommendation Provided To: Patient/Caregiver: 1 via Telephone  Intervention Detail: Scheduled Appointment  Intervention Accepted By: Patient/Caregiver: 1  Gap Closed?: No   Time Spent (min): 15

## 2023-03-07 ENCOUNTER — PHARMACY VISIT (OUTPATIENT)
Dept: FAMILY MEDICINE CLINIC | Age: 38
End: 2023-03-07

## 2023-03-07 ENCOUNTER — OFFICE VISIT (OUTPATIENT)
Dept: FAMILY MEDICINE CLINIC | Age: 38
End: 2023-03-07
Payer: COMMERCIAL

## 2023-03-07 VITALS
HEART RATE: 90 BPM | WEIGHT: 293 LBS | DIASTOLIC BLOOD PRESSURE: 76 MMHG | SYSTOLIC BLOOD PRESSURE: 98 MMHG | BODY MASS INDEX: 54.31 KG/M2 | OXYGEN SATURATION: 98 %

## 2023-03-07 DIAGNOSIS — G47.33 OBSTRUCTIVE SLEEP APNEA SYNDROME: ICD-10-CM

## 2023-03-07 DIAGNOSIS — F34.1 DYSTHYMIA: ICD-10-CM

## 2023-03-07 DIAGNOSIS — E11.9 CONTROLLED TYPE 2 DIABETES MELLITUS WITHOUT COMPLICATION, WITH LONG-TERM CURRENT USE OF INSULIN (HCC): Primary | ICD-10-CM

## 2023-03-07 DIAGNOSIS — Z79.4 CONTROLLED TYPE 2 DIABETES MELLITUS WITHOUT COMPLICATION, WITH LONG-TERM CURRENT USE OF INSULIN (HCC): Primary | ICD-10-CM

## 2023-03-07 DIAGNOSIS — E66.01 MORBID OBESITY DUE TO EXCESS CALORIES (HCC): ICD-10-CM

## 2023-03-07 DIAGNOSIS — E78.2 MIXED HYPERLIPIDEMIA: ICD-10-CM

## 2023-03-07 LAB — HBA1C MFR BLD: 5.9 %

## 2023-03-07 PROCEDURE — 3044F HG A1C LEVEL LT 7.0%: CPT | Performed by: FAMILY MEDICINE

## 2023-03-07 PROCEDURE — 99999 PR OFFICE/OUTPT VISIT,PROCEDURE ONLY: CPT | Performed by: PHARMACIST

## 2023-03-07 PROCEDURE — 83036 HEMOGLOBIN GLYCOSYLATED A1C: CPT | Performed by: FAMILY MEDICINE

## 2023-03-07 PROCEDURE — 99214 OFFICE O/P EST MOD 30 MIN: CPT | Performed by: FAMILY MEDICINE

## 2023-03-07 RX ORDER — INSULIN DEGLUDEC INJECTION 100 U/ML
45 INJECTION, SOLUTION SUBCUTANEOUS
Qty: 10 ML | Refills: 5 | Status: SHIPPED
Start: 2023-03-07

## 2023-03-07 SDOH — ECONOMIC STABILITY: FOOD INSECURITY: WITHIN THE PAST 12 MONTHS, THE FOOD YOU BOUGHT JUST DIDN'T LAST AND YOU DIDN'T HAVE MONEY TO GET MORE.: NEVER TRUE

## 2023-03-07 SDOH — ECONOMIC STABILITY: FOOD INSECURITY: WITHIN THE PAST 12 MONTHS, YOU WORRIED THAT YOUR FOOD WOULD RUN OUT BEFORE YOU GOT MONEY TO BUY MORE.: NEVER TRUE

## 2023-03-07 SDOH — ECONOMIC STABILITY: HOUSING INSECURITY
IN THE LAST 12 MONTHS, WAS THERE A TIME WHEN YOU DID NOT HAVE A STEADY PLACE TO SLEEP OR SLEPT IN A SHELTER (INCLUDING NOW)?: NO

## 2023-03-07 SDOH — ECONOMIC STABILITY: INCOME INSECURITY: HOW HARD IS IT FOR YOU TO PAY FOR THE VERY BASICS LIKE FOOD, HOUSING, MEDICAL CARE, AND HEATING?: SOMEWHAT HARD

## 2023-03-07 ASSESSMENT — PATIENT HEALTH QUESTIONNAIRE - PHQ9
SUM OF ALL RESPONSES TO PHQ QUESTIONS 1-9: 1
2. FEELING DOWN, DEPRESSED OR HOPELESS: 1
SUM OF ALL RESPONSES TO PHQ9 QUESTIONS 1 & 2: 1
SUM OF ALL RESPONSES TO PHQ QUESTIONS 1-9: 5
5. POOR APPETITE OR OVEREATING: 0
1. LITTLE INTEREST OR PLEASURE IN DOING THINGS: 0
SUM OF ALL RESPONSES TO PHQ QUESTIONS 1-9: 1
9. THOUGHTS THAT YOU WOULD BE BETTER OFF DEAD, OR OF HURTING YOURSELF: 0
10. IF YOU CHECKED OFF ANY PROBLEMS, HOW DIFFICULT HAVE THESE PROBLEMS MADE IT FOR YOU TO DO YOUR WORK, TAKE CARE OF THINGS AT HOME, OR GET ALONG WITH OTHER PEOPLE: 0
1. LITTLE INTEREST OR PLEASURE IN DOING THINGS: 0
3. TROUBLE FALLING OR STAYING ASLEEP: 0
4. FEELING TIRED OR HAVING LITTLE ENERGY: 1
SUM OF ALL RESPONSES TO PHQ QUESTIONS 1-9: 1
SUM OF ALL RESPONSES TO PHQ QUESTIONS 1-9: 5
8. MOVING OR SPEAKING SO SLOWLY THAT OTHER PEOPLE COULD HAVE NOTICED. OR THE OPPOSITE, BEING SO FIGETY OR RESTLESS THAT YOU HAVE BEEN MOVING AROUND A LOT MORE THAN USUAL: 3
7. TROUBLE CONCENTRATING ON THINGS, SUCH AS READING THE NEWSPAPER OR WATCHING TELEVISION: 0
SUM OF ALL RESPONSES TO PHQ QUESTIONS 1-9: 5
SUM OF ALL RESPONSES TO PHQ QUESTIONS 1-9: 5
DEPRESSION UNABLE TO ASSESS: FUNCTIONAL CAPACITY MOTIVATION LIMITS ACCURACY
DEPRESSION UNABLE TO ASSESS: FUNCTIONAL CAPACITY MOTIVATION LIMITS ACCURACY
2. FEELING DOWN, DEPRESSED OR HOPELESS: 1
6. FEELING BAD ABOUT YOURSELF - OR THAT YOU ARE A FAILURE OR HAVE LET YOURSELF OR YOUR FAMILY DOWN: 0
SUM OF ALL RESPONSES TO PHQ9 QUESTIONS 1 & 2: 1
SUM OF ALL RESPONSES TO PHQ QUESTIONS 1-9: 1

## 2023-03-07 ASSESSMENT — ENCOUNTER SYMPTOMS
ABDOMINAL PAIN: 0
EYES NEGATIVE: 1
DIARRHEA: 0
BLOOD IN STOOL: 0
CONSTIPATION: 0
ALLERGIC/IMMUNOLOGIC NEGATIVE: 1
COUGH: 0
SHORTNESS OF BREATH: 0

## 2023-03-07 NOTE — PROGRESS NOTES
[facility] [address] [facility]  [address] [address]  Dept: [phone]    3/7/2023    CHIEF COMPLAINT    Chief Complaint   Patient presents with    Diabetes       HPI    Jennifer Gaytan is a 30 y.o. female who presents   Chief Complaint   Patient presents with    Diabetes   . Recheck diabetes. Is working with clinical pharmacist who added trulicity. Working out at [employer]. Losing weight. Using dexcom, was getting low readings at night so tresiba dose was decreased. Vitals:    03/07/23 0805   BP: 98/76   Pulse: 90   SpO2: 98%   Weight: (!) 306 lb 9.6 oz (139.1 kg)       REVIEW OF SYSTEMS    Review of Systems   Constitutional:  Negative for fatigue, fever and unexpected weight change. HENT: Negative. Eyes: Negative. Respiratory:  Negative for cough and shortness of breath. Cardiovascular:  Negative for chest pain and leg swelling. Gastrointestinal:  Negative for abdominal pain, blood in stool, constipation and diarrhea. Endocrine: Negative. Genitourinary:  Negative for frequency and urgency. Musculoskeletal: Negative. Skin: Negative. Allergic/Immunologic: Negative. Neurological:  Negative for dizziness and headaches. Hematological: Negative. Psychiatric/Behavioral:  Negative for sleep disturbance. The patient is not nervous/anxious.       PAST MEDICAL HISTORY    Past Medical History:   Diagnosis Date    Anxiety and depression     Depression     Migraines     Obesity     PONV (postoperative nausea and vomiting)     Seasonal allergies     Sleep apnea     uses cpap nightly    Type 2 diabetes mellitus without complication (HCC)        FAMILY HISTORY    Family History   Problem Relation Age of Onset    High Blood Pressure Mother     Diabetes type 2  Mother     Cancer Maternal Aunt     Breast Cancer Maternal Aunt     Breast Cancer Paternal Aunt     Diabetes Paternal Uncle     Heart Attack Maternal Grandfather High Blood Pressure Maternal Grandfather     Stroke Maternal Grandfather        SOCIAL HISTORY    Social History     Socioeconomic History    Marital status:     Number of children: 2   Occupational History    Occupation:      Employer: [employer]   Tobacco Use    Smoking status: Former     Packs/day: 0.50     Years: 10.00     Pack years: 5.00     Types: Cigarettes     Quit date:      Years since quittin.1    Smokeless tobacco: Never    Tobacco comments:     quit smoking 4 yrs ago   Vaping Use    Vaping Use: Former   Substance and Sexual Activity    Alcohol use: Yes     Comment: social    Drug use: No    Sexual activity: Yes     Partners: Male     Social Determinants of Health     Financial Resource Strain: Medium Risk    Difficulty of Paying Living Expenses: Somewhat hard   Food Insecurity: No Food Insecurity    Worried About Running Out of Food in the Last Year: Never true    Ran Out of Food in the Last Year: Never true   Transportation Needs: Unknown    Lack of Transportation (Non-Medical): No   Housing Stability: Unknown    Unstable Housing in the Last Year: No       SURGICAL HISTORY    Past Surgical History:   Procedure Laterality Date     SECTION      LAPAROSCOPY  2018    LAPAROSCOPY DIAGNOSTIC (N/A Abdomen), Lysis of adhesions    HI LAP,DIAGNOSTIC ABDOMEN N/A 2018    LAPAROSCOPY DIAGNOSTIC performed by Zoie Riley MD at [facility] lt wrist age 7yrs       CURRENT MEDICATIONS    Current Outpatient Medications   Medication Sig Dispense Refill    TRESIBA FLEXTOUCH 100 UNIT/ML SOPN Inject 45 Units into the skin daily (with breakfast) 10 mL 5    metFORMIN (GLUCOPHAGE-XR) 500 MG extended release tablet TAKE TWO TABLETS BY MOUTH DAILY WITH BREAKFAST 60 tablet 1    Cholecalciferol (VITAMIN D3) 125 MCG (5000 UT) CAPS TAKE ONE CAPSULE BY MOUTH DAILY 30 capsule 1    Continuous Blood Gluc Transmit (DEXCOM G6 TRANSMITTER) MISC USE AS DIRECTED 1 each 0    Continuous Blood Gluc Sensor (DEXCOM G6 SENSOR) MISC USE AS DIRECTED TO MEASURE BLOOD SUGAR, CHANGE EVERY 10 DAYS 3 each 2    TRULICITY 2.33 HE/1.1DA SOPN INJECT 0.75 MG UNDER THE SKIN ONCE WEEKLY 2 mL 5    atorvastatin (LIPITOR) 20 MG tablet TAKE ONE TABLET BY MOUTH DAILY 30 tablet 5    lisinopril (PRINIVIL;ZESTRIL) 5 MG tablet TAKE ONE TABLET BY MOUTH DAILY 30 tablet 5    buPROPion (WELLBUTRIN XL) 300 MG extended release tablet TAKE ONE TABLET BY MOUTH DAILY 30 tablet 5    empagliflozin (JARDIANCE) 10 MG tablet Take 1 tablet by mouth daily 90 tablet 1    Misc. Devices MISC Cpap tubing, mouth piece and humidifier 1 each 0    naproxen (NAPROSYN) 500 MG tablet TAKE ONE TABLET BY MOUTH TWICE A DAY WITH FOOD 60 tablet 5    [employer] PEN NEEDLES 31G X 6 MM MISC USE ONCE DAILY WITH TRESIBA 100 each 2    Continuous Blood Gluc  (DEXCOM G6 ) SONIA 1 Device by Does not apply route continuous 1 each 1    Cetirizine HCl (ZYRTEC PO) Take 10 mg by mouth daily       No current facility-administered medications for this visit. ALLERGIES    Allergies   Allergen Reactions    Penicillins Anaphylaxis and Other (See Comments)       PHYSICAL EXAM   Physical Exam  Vitals reviewed. Constitutional:       Appearance: She is well-developed. She is obese. HENT:      Head: Normocephalic. Eyes:      Pupils: Pupils are equal, round, and reactive to light. Neck:      Thyroid: No thyromegaly. Cardiovascular:      Rate and Rhythm: Normal rate and regular rhythm. Heart sounds: Normal heart sounds. No murmur heard. Pulmonary:      Effort: Pulmonary effort is normal.      Breath sounds: Normal breath sounds. No wheezing or rales. Abdominal:      Palpations: Abdomen is soft. Tenderness: There is no abdominal tenderness. There is no guarding or rebound. Musculoskeletal:         General: No tenderness or deformity. Normal range of motion.       Cervical back: Normal range of motion and neck supple. Lymphadenopathy:      Cervical: No cervical adenopathy. Skin:     General: Skin is warm and dry. Neurological:      Mental Status: She is alert and oriented to person, place, and time. Psychiatric:         Mood and Affect: Mood normal.         Behavior: Behavior normal.         Thought Content: Thought content normal.         Judgment: Judgment normal.       ASSESSMENT/PLAN  1. Controlled type 2 diabetes mellitus without complication, with long-term current use of insulin ([facility] [address]  Results for orders placed or performed in visit on 03/07/23   POCT glycosylated hemoglobin (Hb A1C)   Result Value Ref Range    Hemoglobin A1C 5.9 %       - POCT glycosylated hemoglobin (Hb A1C)-5.9  - Protein / Creatinine Ratio, Urine; Future  - TRESIBA FLEXTOUCH 100 UNIT/ML SOPN; Inject 45 Units into the skin daily (with breakfast)  Dispense: 10 mL; Refill: 5    2. Obstructive sleep apnea syndrome  Refer to pulmonary for new equipment  - Lynnette Greene MD, Pulmonology, [place]    3. Morbid obesity due to excess calories (HCC)  Cont exercise, reduced calories    4. Dysthymia  Stable on welbutrin    5. Mixed hyperlipidemia  Cont statin. Recheck labs next appt. Jefry Fam received counseling on the following healthy behaviors: nutrition, exercise, and medication adherence  Reviewed prior labs and health maintenance. Continue current medications, diet and exercise. Discussed use, benefit, and side effects of prescribed medications. Barriers to medication compliance addressed. Patient given educational materials - see patient instructions. All patient questions answered. Patient voiced understanding. Return in about 4 months (around 7/7/2023) for diabetes.         Electronically signed by Jourdan Alanis MD on 3/7/23 at 8:13 AM EST

## 2023-03-07 NOTE — PATIENT INSTRUCTIONS
Decrease Tresiba 40 units in the morning.    Continue metformin 1000 mg daily, Jardiance 10 mg daily, and Trulicity 0.61 mg weekly

## 2023-03-07 NOTE — PROGRESS NOTES
Clinical Pharmacy Note    Jordan Jacobs is a 30 y.o. female, referred to [facility] for medication management by Cande Hernandez MD. Jordan Jacobs was seen today for diabetes management. Allergies   Allergen Reactions    Penicillins Anaphylaxis and Other (See Comments)        Past Medical History:   Diagnosis Date    Anxiety and depression     Depression     Migraines     Obesity     PONV (postoperative nausea and vomiting)     Seasonal allergies     Sleep apnea     uses cpap nightly    Type 2 diabetes mellitus without complication (HCC)       Social History     Tobacco Use    Smoking status: Former     Packs/day: 0.50     Years: 10.00     Pack years: 5.00     Types: Cigarettes     Quit date:      Years since quittin.1    Smokeless tobacco: Never    Tobacco comments:     quit smoking 4 yrs ago   Substance Use Topics    Alcohol use: Yes     Comment: social     Family History   Problem Relation Age of Onset    High Blood Pressure Mother     Diabetes type 2  Mother     Cancer Maternal Aunt     Breast Cancer Maternal Aunt     Breast Cancer Paternal Aunt     Diabetes Paternal Uncle     Heart Attack Maternal Grandfather     High Blood Pressure Maternal Grandfather     Stroke Maternal Grandfather      REVIEW OF CURRENT DISEASE STATE  Diabetes Mellitus: Patient here for an evaluation of Type 2 diabetes mellitus. Current symptoms/problems include hyperglycemia and polyuria and have been unchanged. Symptoms have been present for 1 year. Interim Update: Patient was last seen by clinical pharmacy 2023. Patient continued medications as prescribed. Patient's A1c today resulted at 5.9%. Patient reports having low blood sugars (~60) three times since last appointment. Patient has not been at the gym as regularly due to  having back pain and not being able to join. Patient stated that her goal is to not have her A1c above 7% again and to begin to decrease her [place].     DIABETES MEDICATIONS  Current Therapy: Tresiba, Jardiance, Trulicity, metformin    Previous Therapy: Januvia, Janumet    MEDICATIONS  Current Outpatient Medications   Medication Sig Dispense Refill    TRESIBA FLEXTOUCH 100 UNIT/ML SOPN Inject 45 Units into the skin daily (with breakfast) 10 mL 5    metFORMIN (GLUCOPHAGE-XR) 500 MG extended release tablet TAKE TWO TABLETS BY MOUTH DAILY WITH BREAKFAST 60 tablet 1    Cholecalciferol (VITAMIN D3) 125 MCG (5000 UT) CAPS TAKE ONE CAPSULE BY MOUTH DAILY 30 capsule 1    Continuous Blood Gluc Transmit (DEXCOM G6 TRANSMITTER) MISC USE AS DIRECTED 1 each 0    Continuous Blood Gluc Sensor (DEXCOM G6 SENSOR) MISC USE AS DIRECTED TO MEASURE BLOOD SUGAR, CHANGE EVERY 10 DAYS 3 each 2    TRULICITY 5.79 EY/0.7ED SOPN INJECT 0.75 MG UNDER THE SKIN ONCE WEEKLY 2 mL 5    atorvastatin (LIPITOR) 20 MG tablet TAKE ONE TABLET BY MOUTH DAILY 30 tablet 5    lisinopril (PRINIVIL;ZESTRIL) 5 MG tablet TAKE ONE TABLET BY MOUTH DAILY 30 tablet 5    buPROPion (WELLBUTRIN XL) 300 MG extended release tablet TAKE ONE TABLET BY MOUTH DAILY 30 tablet 5    empagliflozin (JARDIANCE) 10 MG tablet Take 1 tablet by mouth daily 90 tablet 1    Misc. Devices MISC Cpap tubing, mouth piece and humidifier 1 each 0    naproxen (NAPROSYN) 500 MG tablet TAKE ONE TABLET BY MOUTH TWICE A DAY WITH FOOD 60 tablet 5    [employer] PEN NEEDLES 31G X 6 MM MISC USE ONCE DAILY WITH TRESIBA 100 each 2    Continuous Blood Gluc  (DEXCOM G6 ) SONIA 1 Device by Does not apply route continuous 1 each 1    Cetirizine HCl (ZYRTEC PO) Take 10 mg by mouth daily       No current facility-administered medications for this visit.      ALLERGIES  Allergies   Allergen Reactions    Penicillins Anaphylaxis and Other (See Comments)     Current Pharmacy: [facility]  Current testing supplies/frequency: Dexcom G6   Pen needles/syringes: generic    LABS  Diabetes Goals: Using ADA Standards of Care     Goal A1c:  Less than 7%   Fasting Blood Sugars: 80-130mg/dL  Postprandial glucose:  Less than 180mg/dL     No results found for: LABMICR, [id number]  Lab Results   Component Value Date    LABA1C 5.9 03/07/2023    LABA1C 9.6 11/07/2022    LABA1C 7.0 04/14/2022     Lab Results   Component Value Date    CHOL 145 08/15/2022    TRIG 166 (H) 08/15/2022    HDL 45 08/15/2022    LDLCHOLESTEROL 67 08/15/2022    LDLCALC 138 (A) 01/09/2018     ALT   Date Value Ref Range Status   11/07/2022 54 (H) 5 - 33 U/L Final     AST   Date Value Ref Range Status   11/07/2022 27 <32 U/L Final     Lab Results   Component Value Date    CREATININE 0.76 11/07/2022     Estimated Creatinine Clearance: 142 mL/min (based on SCr of 0.76 mg/dL).   Lab Results   Component Value Date/Time    LABGLOM >60 11/07/2022 08:45 AM    LABGLOM >60 08/15/2022 09:00 AM    LABGLOM >60 02/09/2021 10:17 PM    LABGLOM >60 08/25/2020 12:45 PM    LABGLOM >60 07/25/2019 03:19 PM     VITALS  Wt Readings from Last 3 Encounters:   03/07/23 (!) 306 lb 9.6 oz (139.1 kg)   02/02/23 (!) 307 lb (139.3 kg)   01/05/23 (!) 313 lb 3.2 oz (142.1 kg)     BP Readings from Last 3 Encounters:   03/07/23 98/76   02/02/23 106/66   01/05/23 120/82     Pulse Readings from Last 3 Encounters:   03/07/23 90   02/02/23 84   01/05/23 86     REVIEW OF SYSTEMS   Recent falls: No  Hyperglycemia: none   Hypoglycemia: dizziness and jitteriness  Checking feet: Yes    NUTRITION  Breakfast: coffee with cream no sugar, jalapeno and egg burrito   Lunch: varies - leftovers from previous meals, takeout - er[employer], sherrie bagels, or [place]  Dinner: all over the place, cooks a lot at home, spaghetti, burgers, shredded chicken, soups, chili - increasing vegetables at meals; subbing spaghetti squash for pasta  Snack(s): protein based snacks - protein bars, protein snacks (cheese, nuts, fruit [P3 type], chips (patients struggles with)  Beverage(s): coffee, water with liquid IV, soda twice a week regular (dr bonner) now drinking diet dr bonner  Alcohol Consumption? Yes - socially 3 times a month  Prior visit with dietician: yes - did not find beneficial    PHYSICAL ACTIVITY  Reactivated [employer] membership - half hour on treadmill typically, 30 minutes on bike, rotation of exercise machines - last time all 3; trying to make 3 days a week (typically W, F and attempting to add M)    [address] in home medications including OTCs: No  - Barriers to affording/accessing medications: No  - Uses pillbox/medication organizing method: Yes  - Adhering to current medication regimen: Yes    IMMUNIZATIONS  Immunization History   Administered Date(s) Administered    COVID-19, MODERNA BLUE border, Primary or Immunocompromised, (age 12y+), IM, 100 mcg/0.5mL 10/31/2021, 11/28/2021    Influenza Vaccine, unspecified formulation 11/06/2015    Influenza Virus Vaccine 11/06/2015    Influenza, AFLURIA (age 1 yrs+), FLUZONE, (age 10 mo+), MDV, 0.5mL 09/08/2017    Influenza, FLUCELVAX, (age 10 mo+), MDCK, PF, 0.5mL 12/02/2021, 11/07/2022    Pneumococcal Conjugate 13-valent ([id number]) 09/08/2017    Td (Adult), 5 Lf Tetanus Toxoid, Pf (Tenivac, Decavac) 09/02/2005    Tdap (Boostrix, Adacel) 10/31/2014      Immunizations:   Influenza: Complete  PCV20: consider   Shingrix: not due   Hepatitis B series: consider     HEALTH MAINTENANCE  The ASCVD Risk score ([place] DK, et al., 2019) failed to calculate for the following reasons: The 2019 ASCVD risk score is only valid for ages 36 to 78  Cardiovascular risk factors: diabetes mellitus, dyslipidemia, obesity (BMI >= 30 kg/m2), and sedentary lifestyle  Aspirin: No  Statin: Yes atorvastatin  ACEi/ARB: Yes lisinopril  Nicotine use: No     ASSESSMENT/PLAN  Diabetes mellitus Type II, under good control.  - Patient's A1c goal is <7%. Patient's A1c is  at goal.   - Current medications include Jardiance, Trulicity, metformin, and [place].  Adherence is Very Good  - Patient's testing blood glucose via Dexcom.  - Decrease Tresiba to 40 units daily  - Continue metformin 1000 mg daily, Jardiance 10 mg daily, and Trulicity 6.19 mg weekly  - Needed lab monitoring includes none at this time  - Reviewed and provided resource discussing s/sx of hypoglycemia and management.  - Bring glucometer/log/CGM reader to all future appointments. Hypertension  - Per 2017 ACC/AHA blood pressure goal is <130/80. Patient is  at goal.   - Current medications include lisinopril. Adherence is Very Good  - Continue lisinopril  Cholesterol  - LDL is 67  - Current medications include atorvastatin. Adherence is Very Good  - Discussed diabetes as a high risk factor of ASCVD. - Continue atorvastatin  Nutrition/Lifestyle Modifications  - Patient's diet has been improving and transitioning towards a diabetic diet. Patient has been decreasing carbs and increasing vegetables with each meal  - Encouraged reading nutrition labels and focusing on limiting carbohydrate intake to 45-60 grams/meal and 15 grams/snack; at least 1 serving of protein/meal  - Recommend ~30 minutes consistent, moderately intensive, exercise/day or ~150 minutes/week. Encouraged patient - to start small, stay consistent, and increase length and types of exercise as tolerated. - Discussed goal of losing ~7% of current body weight. Goal weight is 304 lbs. based on today's weight. Patient goals for next appointment  Continue metformin 1000 mg daily and Tresiba 40 units in the morning. Continue Jardiance 10 mg daily and Trulicity 5.41 mg weekly    Patient verbalized understanding of the information presented and all of the patient's questions were answered. AVS was handed to the patient. Patient advised to call the office with any additional questions or concerns. Return to clinic in 6 week (s) for follow up.      Thank you for the consult,     Geraldine Cruz, PharmD, [address],[place] Specialty Medication Service  Phone: [phone]  [facility]  Phone: [phone] [address] Only    Program: Medical Group  CPA in place:  Yes  Recommendation Provided To: Patient/Caregiver: 2 via In person  Intervention Detail: Dose Adjustment: 1, reason: Therapy De-escalation and Scheduled Appointment  Intervention Accepted By: Patient/Caregiver: 2  Gap Closed?: Yes   Time Spent (min): 60

## 2023-03-27 ENCOUNTER — TELEPHONE (OUTPATIENT)
Dept: FAMILY MEDICINE CLINIC | Age: 38
End: 2023-03-27

## 2023-03-27 DIAGNOSIS — E11.9 CONTROLLED TYPE 2 DIABETES MELLITUS WITHOUT COMPLICATION, WITHOUT LONG-TERM CURRENT USE OF INSULIN (HCC): ICD-10-CM

## 2023-03-27 DIAGNOSIS — E11.9 CONTROLLED TYPE 2 DIABETES MELLITUS WITHOUT COMPLICATION, WITH LONG-TERM CURRENT USE OF INSULIN (HCC): ICD-10-CM

## 2023-03-27 DIAGNOSIS — Z79.4 CONTROLLED TYPE 2 DIABETES MELLITUS WITHOUT COMPLICATION, WITH LONG-TERM CURRENT USE OF INSULIN (HCC): ICD-10-CM

## 2023-03-27 RX ORDER — DULAGLUTIDE 1.5 MG/.5ML
1.5 INJECTION, SOLUTION SUBCUTANEOUS WEEKLY
Qty: 2 ML | Refills: 2 | Status: SHIPPED | OUTPATIENT
Start: 2023-03-27

## 2023-03-27 NOTE — TELEPHONE ENCOUNTER
Medication Management Service    Date: 3/27/2023  Patient's Name: Bettye Aj YOB: 1985            _____________________________________________________________________________________________    Patient called in to office to discuss Trulicity and [place]. Patient's BG was 92 today and was feeling hypoglycemic symptoms. Patient's previous A1c was 5.9% and is wanting to decrease her insulin doses. Will increase Trulicity to 1.5 mg weekly and decrease Tresiba to 34 units daily. Patient to continue to monitor BG and attempt to upload to [address] [place] [address] account for office sharing.      José Miguel Rowe, PharmD, [address],[place] Specialty Medication Service  Phone: [phone]  [facility]  Phone: [phone] [address] Only    Program: Medical Group  CPA in place:  Yes  Recommendation Provided To: Patient/Caregiver: 2 via Telephone  Intervention Detail: Dose Adjustment: 2, reason: Therapy De-escalation, Therapy Optimization  Intervention Accepted By: Patient/Caregiver: 2  Gap Closed?: No   Time Spent (min): 20

## 2023-04-20 ENCOUNTER — PHARMACY VISIT (OUTPATIENT)
Dept: FAMILY MEDICINE CLINIC | Age: 38
End: 2023-04-20

## 2023-04-20 VITALS
DIASTOLIC BLOOD PRESSURE: 71 MMHG | SYSTOLIC BLOOD PRESSURE: 108 MMHG | WEIGHT: 293 LBS | BODY MASS INDEX: 54.17 KG/M2 | HEART RATE: 86 BPM

## 2023-04-20 DIAGNOSIS — E11.9 CONTROLLED TYPE 2 DIABETES MELLITUS WITHOUT COMPLICATION, WITHOUT LONG-TERM CURRENT USE OF INSULIN (HCC): Primary | ICD-10-CM

## 2023-04-20 PROCEDURE — 99999 PR OFFICE/OUTPT VISIT,PROCEDURE ONLY: CPT | Performed by: PHARMACIST

## 2023-04-20 ASSESSMENT — PATIENT HEALTH QUESTIONNAIRE - PHQ9
SUM OF ALL RESPONSES TO PHQ QUESTIONS 1-9: 0
SUM OF ALL RESPONSES TO PHQ9 QUESTIONS 1 & 2: 0
SUM OF ALL RESPONSES TO PHQ QUESTIONS 1-9: 0
SUM OF ALL RESPONSES TO PHQ QUESTIONS 1-9: 0
1. LITTLE INTEREST OR PLEASURE IN DOING THINGS: 0
2. FEELING DOWN, DEPRESSED OR HOPELESS: 0
SUM OF ALL RESPONSES TO PHQ QUESTIONS 1-9: 0

## 2023-04-20 NOTE — PROGRESS NOTES
Clinical Pharmacy Note    Margaret Vazquez is a 30 y.o. female, referred to [facility] for medication management by Tiffany Oswald MD. Margaret Vazquez was seen today for diabetes management. 90s is lows mainly when prolonged meals occur and continuous working  One high due to stress eating deserts    Allergies   Allergen Reactions    Penicillins Anaphylaxis and Other (See Comments)        Past Medical History:   Diagnosis Date    Anxiety and depression     Depression     Migraines     Obesity     PONV (postoperative nausea and vomiting)     Seasonal allergies     Sleep apnea     uses cpap nightly    Type 2 diabetes mellitus without complication (HCC)      Social History     Tobacco Use    Smoking status: Former     Packs/day: 0.50     Years: 10.00     Pack years: 5.00     Types: Cigarettes     Quit date:      Years since quittin.3    Smokeless tobacco: Never    Tobacco comments:     quit smoking 4 yrs ago   Substance Use Topics    Alcohol use: Yes     Comment: social     Family History   Problem Relation Age of Onset    High Blood Pressure Mother     Diabetes type 2  Mother     Cancer Maternal Aunt     Breast Cancer Maternal Aunt     Breast Cancer Paternal Aunt     Diabetes Paternal Uncle     Heart Attack Maternal Grandfather     High Blood Pressure Maternal Grandfather     Stroke Maternal Grandfather      REVIEW OF CURRENT DISEASE STATE  Diabetes Mellitus: Patient here for an evaluation of Type 2 diabetes mellitus. Current symptoms/problems include none. Interim Update: Patient was last seen by clinical pharmacy 3/7/2023. Patient's A1c resulted at 5.9%. Patient reports having low blood sugars (~60) three times since last appointment. Patient has not been at the gym as regularly due to  having back pain and not being able to join. Patient stated that her goal is to not have her A1c above 7% again and to begin to decrease her [place].     DIABETES MEDICATIONS  Current Therapy:

## 2023-05-14 DIAGNOSIS — Z79.4 CONTROLLED TYPE 2 DIABETES MELLITUS WITHOUT COMPLICATION, WITH LONG-TERM CURRENT USE OF INSULIN (HCC): ICD-10-CM

## 2023-05-14 DIAGNOSIS — E11.9 CONTROLLED TYPE 2 DIABETES MELLITUS WITHOUT COMPLICATION, WITH LONG-TERM CURRENT USE OF INSULIN (HCC): ICD-10-CM

## 2023-05-15 RX ORDER — EMPAGLIFLOZIN 10 MG/1
TABLET, FILM COATED ORAL
Qty: 30 TABLET | Refills: 5 | Status: SHIPPED | OUTPATIENT
Start: 2023-05-15

## 2023-05-18 ENCOUNTER — PHARMACY VISIT (OUTPATIENT)
Dept: FAMILY MEDICINE CLINIC | Age: 38
End: 2023-05-18

## 2023-05-18 VITALS — WEIGHT: 293 LBS | BODY MASS INDEX: 53.71 KG/M2

## 2023-05-18 DIAGNOSIS — E11.9 CONTROLLED TYPE 2 DIABETES MELLITUS WITHOUT COMPLICATION, WITHOUT LONG-TERM CURRENT USE OF INSULIN (HCC): ICD-10-CM

## 2023-05-18 DIAGNOSIS — E11.9 CONTROLLED TYPE 2 DIABETES MELLITUS WITHOUT COMPLICATION, WITH LONG-TERM CURRENT USE OF INSULIN (HCC): ICD-10-CM

## 2023-05-18 DIAGNOSIS — Z79.4 CONTROLLED TYPE 2 DIABETES MELLITUS WITHOUT COMPLICATION, WITH LONG-TERM CURRENT USE OF INSULIN (HCC): ICD-10-CM

## 2023-05-18 RX ORDER — INSULIN DEGLUDEC INJECTION 100 U/ML
30 INJECTION, SOLUTION SUBCUTANEOUS
Qty: 10 ML | Refills: 5 | Status: SHIPPED
Start: 2023-05-18

## 2023-05-18 RX ORDER — METFORMIN HYDROCHLORIDE 500 MG/1
TABLET, EXTENDED RELEASE ORAL
Qty: 120 TABLET | Refills: 1 | Status: SHIPPED | OUTPATIENT
Start: 2023-05-18

## 2023-05-18 NOTE — PROGRESS NOTES
Clinical Pharmacy Note    Maria De Jesus Stone is a 30 y.o. female, referred to [facility] for medication management by Mimi Anderson MD. Maria De Jesus Stone was seen today for diabetes management. Allergies   Allergen Reactions    Penicillins Anaphylaxis and Other (See Comments)        Past Medical History:   Diagnosis Date    Anxiety and depression     Depression     Migraines     Obesity     PONV (postoperative nausea and vomiting)     Seasonal allergies     Sleep apnea     uses cpap nightly    Type 2 diabetes mellitus without complication (HCC)       Social History     Tobacco Use    Smoking status: Former     Packs/day: 0.50     Years: 10.00     Pack years: 5.00     Types: Cigarettes     Quit date:      Years since quittin.3    Smokeless tobacco: Never    Tobacco comments:     quit smoking 4 yrs ago   Substance Use Topics    Alcohol use: Yes     Comment: social     Family History   Problem Relation Age of Onset    High Blood Pressure Mother     Diabetes type 2  Mother     Cancer Maternal Aunt     Breast Cancer Maternal Aunt     Breast Cancer Paternal Aunt     Diabetes Paternal Uncle     Heart Attack Maternal Grandfather     High Blood Pressure Maternal Grandfather     Stroke Maternal Grandfather      REVIEW OF CURRENT DISEASE STATE    Diabetes Mellitus: Patient here for an evaluation of Type 2 diabetes mellitus. Current symptoms/problems include none. Interim Update: Patient was last seen by clinical pharmacy on 2023. Patient reported that her glucose readings using the Dexcom G6 have remained consistent and she has experienced no hypoglycemic readings or symptoms during this time. Patient stated she will begin to have symptoms when her glucose is in the 90s and is aware of how to correct low readings. Patient stated that she is still experiencing GI side effects with the Trulicty but the side effects have improved since her last visit with clinical pharmacy.   Patient reported

## 2023-05-18 NOTE — PROGRESS NOTES
I have discussed the care of this patient with the resident and I agree with the above as documented.      Robin Richard, PharmD, [facility]  Ambulatory Clinical Pharmacist   [facility] Specialty Medication Service  Phone: [phone]  [facility]  Phone: [phone] option [address] Only    Program: Medical Group  CPA in place:  Yes  Time Spent (min): 30

## 2023-05-19 DIAGNOSIS — E11.9 CONTROLLED TYPE 2 DIABETES MELLITUS WITHOUT COMPLICATION, WITHOUT LONG-TERM CURRENT USE OF INSULIN (HCC): ICD-10-CM

## 2023-05-19 RX ORDER — PROCHLORPERAZINE 25 MG/1
SUPPOSITORY RECTAL
Qty: 1 EACH | Refills: 3 | Status: SHIPPED | OUTPATIENT
Start: 2023-05-19

## 2023-05-24 DIAGNOSIS — F33.42 RECURRENT MAJOR DEPRESSIVE DISORDER, IN FULL REMISSION (HCC): ICD-10-CM

## 2023-05-24 RX ORDER — BUPROPION HYDROCHLORIDE 300 MG/1
TABLET ORAL
Qty: 30 TABLET | Refills: 5 | Status: SHIPPED | OUTPATIENT
Start: 2023-05-24

## 2023-05-24 NOTE — TELEPHONE ENCOUNTER
Lisbeth Phillips is calling to request a refill on the following medication(s):    Last Visit Date (If Applicable):  6/4/6242    Next Visit Date:    7/6/2023    Medication Request:  Requested Prescriptions     Pending Prescriptions Disp Refills    buPROPion (WELLBUTRIN XL) 300 MG extended release tablet [Pharmacy Med Name: buPROPion HCL  MG TABLET] 30 tablet 5     Sig: TAKE ONE TABLET BY MOUTH DAILY

## 2023-05-26 ENCOUNTER — OFFICE VISIT (OUTPATIENT)
Dept: PULMONOLOGY | Age: 38
End: 2023-05-26
Payer: COMMERCIAL

## 2023-05-26 VITALS
WEIGHT: 293 LBS | HEART RATE: 107 BPM | RESPIRATION RATE: 20 BRPM | BODY MASS INDEX: 53.92 KG/M2 | OXYGEN SATURATION: 97 % | DIASTOLIC BLOOD PRESSURE: 88 MMHG | SYSTOLIC BLOOD PRESSURE: 138 MMHG | HEIGHT: 62 IN

## 2023-05-26 DIAGNOSIS — G47.33 OSA ON CPAP: Primary | ICD-10-CM

## 2023-05-26 DIAGNOSIS — Z99.89 OSA ON CPAP: Primary | ICD-10-CM

## 2023-05-26 DIAGNOSIS — E66.01 MORBID OBESITY WITH BMI OF 50.0-59.9, ADULT (HCC): ICD-10-CM

## 2023-05-26 PROCEDURE — 99203 OFFICE O/P NEW LOW 30 MIN: CPT | Performed by: INTERNAL MEDICINE

## 2023-05-26 ASSESSMENT — SLEEP AND FATIGUE QUESTIONNAIRES
HOW LIKELY ARE YOU TO NOD OFF OR FALL ASLEEP WHILE SITTING AND TALKING TO SOMEONE: 0
HOW LIKELY ARE YOU TO NOD OFF OR FALL ASLEEP WHILE WATCHING TV: 0
HOW LIKELY ARE YOU TO NOD OFF OR FALL ASLEEP WHILE SITTING QUIETLY AFTER LUNCH WITHOUT ALCOHOL: 0
HOW LIKELY ARE YOU TO NOD OFF OR FALL ASLEEP WHEN YOU ARE A PASSENGER IN A CAR FOR AN HOUR WITHOUT A BREAK: 0
ESS TOTAL SCORE: 1
HOW LIKELY ARE YOU TO NOD OFF OR FALL ASLEEP WHILE SITTING AND READING: 0
HOW LIKELY ARE YOU TO NOD OFF OR FALL ASLEEP IN A CAR, WHILE STOPPED FOR A FEW MINUTES IN TRAFFIC: 0
HOW LIKELY ARE YOU TO NOD OFF OR FALL ASLEEP WHILE SITTING INACTIVE IN A PUBLIC PLACE: 0
HOW LIKELY ARE YOU TO NOD OFF OR FALL ASLEEP WHILE LYING DOWN TO REST IN THE AFTERNOON WHEN CIRCUMSTANCES PERMIT: 1

## 2023-05-26 NOTE — PROGRESS NOTES
OUTPATIENT PULMONARY CONSULT NOTE      Patient:  Monico Tejada  MRN: [id number]    Consulting Physician: Linwood Coleman MD  Reason for Consult: Obstructive sleep apnea  Primacy Care Physician: Linwood Coleman MD    HISTORY OF PRESENT ILLNESS:   The patient is a 30 y.o. female   Here history of longstanding obstructive sleep apnea apparently the first time she was diagnosed with sleep apnea was 14 years ago when she had the first sleep study done. Her last sleep study was done on 11/27/2018 which was a split-night study baseline portion show an RDI of greater than 70 and then she was titrated to a CPAP of 10 cm. According to patient she was followed by Dr. Ilana Vega for some time had not seen him for 3 years since pandemic started and wanted to establish care here. She is compliant with CPAP use CPAP every night according patient she still feels tired and fatigued during the daytime she usually goes to sleep around anywhere from 11 PM to 1:30 in the morning when she wake up around 645 to 7:15 in the morning. She does not doze off during the daytime and does not take nap during the daytime her sleep is most of the time refreshing she does not wake up at night and denies frequent awakening at night she denies any symptoms of pain or restlessness in her legs or waking up at night from those symptoms. She use CPAP with nasal pillows she had tried on mask before for 6 months but she does better with nasal pillows. She does have history of postnasal drip and nasal congestion she takes Zyrtec or Claritin as needed currently she is taking Zyrtec. According to patient she has shallow breathing she is able to do her regular activities she has dyspnea only on exertional activities. She does not complain of cough denies orthopnea PND and denies wheezing denies nocturnal awakening with cough wheezing chest tightness or shortness of breath.     She did have a history of smoking she smoked for 9 years 1.5 pack/day and

## 2023-06-18 DIAGNOSIS — E11.9 CONTROLLED TYPE 2 DIABETES MELLITUS WITHOUT COMPLICATION, WITHOUT LONG-TERM CURRENT USE OF INSULIN (HCC): ICD-10-CM

## 2023-06-19 RX ORDER — DULAGLUTIDE 1.5 MG/.5ML
INJECTION, SOLUTION SUBCUTANEOUS
Qty: 2 ML | Refills: 2 | Status: SHIPPED | OUTPATIENT
Start: 2023-06-19

## 2023-06-22 DIAGNOSIS — E78.2 MIXED HYPERLIPIDEMIA: ICD-10-CM

## 2023-06-22 RX ORDER — ATORVASTATIN CALCIUM 20 MG/1
TABLET, FILM COATED ORAL
Qty: 30 TABLET | Refills: 5 | Status: SHIPPED | OUTPATIENT
Start: 2023-06-22

## 2023-06-22 NOTE — TELEPHONE ENCOUNTER
Mariann Segal is calling to request a refill on the following medication(s):    Last Visit Date (If Applicable):  3/7/3270    Next Visit Date:    7/6/2023    Medication Request:  Requested Prescriptions     Pending Prescriptions Disp Refills    atorvastatin (LIPITOR) 20 MG tablet [Pharmacy Med Name: ATORVASTATIN 20 MG TABLET] 30 tablet 5     Sig: TAKE ONE TABLET BY MOUTH DAILY

## 2023-07-06 ENCOUNTER — HOSPITAL ENCOUNTER (OUTPATIENT)
Age: 38
Setting detail: SPECIMEN
Discharge: HOME | End: 2023-07-06

## 2023-07-06 ENCOUNTER — OFFICE VISIT (OUTPATIENT)
Dept: FAMILY MEDICINE CLINIC | Age: 38
End: 2023-07-06
Payer: COMMERCIAL

## 2023-07-06 VITALS
HEART RATE: 100 BPM | DIASTOLIC BLOOD PRESSURE: 80 MMHG | WEIGHT: 293 LBS | SYSTOLIC BLOOD PRESSURE: 108 MMHG | BODY MASS INDEX: 53.92 KG/M2 | HEIGHT: 62 IN

## 2023-07-06 DIAGNOSIS — E66.01 MORBID OBESITY DUE TO EXCESS CALORIES (HCC): ICD-10-CM

## 2023-07-06 DIAGNOSIS — E11.9 CONTROLLED TYPE 2 DIABETES MELLITUS WITHOUT COMPLICATION, WITHOUT LONG-TERM CURRENT USE OF INSULIN (HCC): Primary | ICD-10-CM

## 2023-07-06 DIAGNOSIS — F33.42 RECURRENT MAJOR DEPRESSIVE DISORDER, IN FULL REMISSION (HCC): ICD-10-CM

## 2023-07-06 DIAGNOSIS — E11.9 CONTROLLED TYPE 2 DIABETES MELLITUS WITHOUT COMPLICATION, WITHOUT LONG-TERM CURRENT USE OF INSULIN (HCC): ICD-10-CM

## 2023-07-06 DIAGNOSIS — E55.9 VITAMIN D DEFICIENCY: ICD-10-CM

## 2023-07-06 DIAGNOSIS — E78.2 MIXED HYPERLIPIDEMIA: ICD-10-CM

## 2023-07-06 LAB
CREAT UR-MCNC: 120 MG/DL (ref 28–217)
HBA1C MFR BLD: 5.8 %
TOTAL PROTEIN, URINE: 9 MG/DL
URINE TOTAL PROTEIN CREATININE RATIO: 0.08 (ref 0–0.2)

## 2023-07-06 PROCEDURE — 3044F HG A1C LEVEL LT 7.0%: CPT | Performed by: FAMILY MEDICINE

## 2023-07-06 PROCEDURE — 99214 OFFICE O/P EST MOD 30 MIN: CPT | Performed by: FAMILY MEDICINE

## 2023-07-06 PROCEDURE — 83037 HB GLYCOSYLATED A1C HOME DEV: CPT | Performed by: FAMILY MEDICINE

## 2023-07-06 ASSESSMENT — ENCOUNTER SYMPTOMS
COUGH: 0
BLOOD IN STOOL: 0
DIARRHEA: 0
SHORTNESS OF BREATH: 0
EYES NEGATIVE: 1
CONSTIPATION: 0
ALLERGIC/IMMUNOLOGIC NEGATIVE: 1
ABDOMINAL PAIN: 0

## 2023-07-06 NOTE — PROGRESS NOTES
sounds: Normal breath sounds. No wheezing or rales. Abdominal:      Palpations: Abdomen is soft. Tenderness: There is no abdominal tenderness. There is no guarding or rebound. Musculoskeletal:         General: No tenderness or deformity. Normal range of motion. Cervical back: Normal range of motion and neck supple. Lymphadenopathy:      Cervical: No cervical adenopathy. Skin:     General: Skin is warm and dry. Neurological:      Mental Status: She is alert and oriented to person, place, and time. Psychiatric:         Mood and Affect: Mood normal.         Behavior: Behavior normal.         Thought Content: Thought content normal.         Judgment: Judgment normal.       ASSESSMENT/PLAN  1. Controlled type 2 diabetes mellitus without complication, without long-term current use of insulin ([address])  Results for orders placed or performed in visit on 07/06/23   POCT glycosylated hemoglobin (Hb A1C)   Result Value Ref Range    Hemoglobin A1C 5.8 %     Chronic, stable, continue current medication and/or plan    - POCT glycosylated hemoglobin (Hb A1C)  - Protein / creatinine ratio, urine; Future    2. Mixed hyperlipidemia  Cont statin, recheck next appt    3. Recurrent major depressive disorder, in full remission ([address])  Stable on bupropion. 4. Vitamin D deficiency  Cont supplement. - Cholecalciferol (VITAMIN D3) 125 MCG (5000 UT) CAPS; Take 1 capsule by mouth daily  Dispense: 90 capsule; Refill: 3    5. Morbid obesity due to excess calories ([address])  Encouraged increasing exercise, cont healthy diet. Aldo Valladares received counseling on the following healthy behaviors: nutrition, exercise, and medication adherence  Reviewed prior labs and health maintenance. Continue current medications, diet and exercise. Discussed use, benefit, and side effects of prescribed medications. Barriers to medication compliance addressed. Patient given educational materials - see patient instructions.     All

## 2023-07-15 DIAGNOSIS — E11.9 CONTROLLED TYPE 2 DIABETES MELLITUS WITHOUT COMPLICATION, WITHOUT LONG-TERM CURRENT USE OF INSULIN (HCC): ICD-10-CM

## 2023-07-17 RX ORDER — PROCHLORPERAZINE 25 MG/1
SUPPOSITORY RECTAL
Qty: 3 EACH | Refills: 2 | Status: SHIPPED | OUTPATIENT
Start: 2023-07-17

## 2023-07-17 RX ORDER — METFORMIN HYDROCHLORIDE 500 MG/1
TABLET, EXTENDED RELEASE ORAL
Qty: 120 TABLET | Refills: 1 | Status: SHIPPED | OUTPATIENT
Start: 2023-07-17

## 2023-07-27 ENCOUNTER — PHARMACY VISIT (OUTPATIENT)
Dept: FAMILY MEDICINE CLINIC | Age: 38
End: 2023-07-27

## 2023-07-27 VITALS — BODY MASS INDEX: 55.53 KG/M2 | WEIGHT: 293 LBS

## 2023-07-27 DIAGNOSIS — Z79.4 TYPE 2 DIABETES MELLITUS WITHOUT COMPLICATION, WITH LONG-TERM CURRENT USE OF INSULIN (HCC): Primary | ICD-10-CM

## 2023-07-27 DIAGNOSIS — E11.9 TYPE 2 DIABETES MELLITUS WITHOUT COMPLICATION, WITH LONG-TERM CURRENT USE OF INSULIN (HCC): Primary | ICD-10-CM

## 2023-07-27 PROCEDURE — 99999 PR OFFICE/OUTPT VISIT,PROCEDURE ONLY: CPT | Performed by: PHARMACIST

## 2023-07-27 RX ORDER — TIRZEPATIDE 2.5 MG/.5ML
2.5 INJECTION, SOLUTION SUBCUTANEOUS WEEKLY
Qty: 2 ML | Refills: 0 | Status: SHIPPED | OUTPATIENT
Start: 2023-07-27

## 2023-07-27 ASSESSMENT — PATIENT HEALTH QUESTIONNAIRE - PHQ9
2. FEELING DOWN, DEPRESSED OR HOPELESS: 0
SUM OF ALL RESPONSES TO PHQ9 QUESTIONS 1 & 2: 0
1. LITTLE INTEREST OR PLEASURE IN DOING THINGS: 0
SUM OF ALL RESPONSES TO PHQ QUESTIONS 1-9: 0

## 2023-07-27 NOTE — PROGRESS NOTES
Clinical Pharmacy Note    Erika Herzog is a 41 y.o. female, referred to [facility] for medication management by Reid Alejo MD. Erika Herzog was seen today for diabetes management. Patient goals for next appointment  Finish Trulicity pens on hand and then start Mounjaro 2.5 mg once a week or Ozempic 0.25 mg once a week based on coverage. Continue metformin 2000 mg once a day and Tresiba 34 units once a day    Allergies   Allergen Reactions    Penicillins Anaphylaxis and Other (See Comments)        Past Medical History:   Diagnosis Date    Anxiety and depression     Depression     Migraines     Obesity     PONV (postoperative nausea and vomiting)     Seasonal allergies     Sleep apnea     uses cpap nightly    Type 2 diabetes mellitus without complication (HCC)       Social History     Tobacco Use    Smoking status: Former     Packs/day: 0.50     Years: 10.00     Pack years: 5.00     Types: Cigarettes     Quit date:      Years since quittin.5    Smokeless tobacco: Never    Tobacco comments:     quit smoking 4 yrs ago   Substance Use Topics    Alcohol use: Yes     Comment: social     Family History   Problem Relation Age of Onset    High Blood Pressure Mother     Diabetes type 2  Mother     Cancer Maternal Aunt     Breast Cancer Maternal Aunt     Breast Cancer Paternal Aunt     Diabetes Paternal Uncle     Heart Attack Maternal Grandfather     High Blood Pressure Maternal Grandfather     Stroke Maternal Grandfather      REVIEW OF CURRENT DISEASE STATE  Diabetes Mellitus: Patient here for an evaluation of Type 2 diabetes mellitus. Current symptoms/problems include hyperglycemia and polyuria and have been unchanged. Symptoms have been present for 1 year. Interim Update: Patient was last seen 2023. Patient's recent A1c resulted 5.8%. Patient tried to decrease Tresiba to 30 units but BG began to climb back up so she reverted to 34 units daily.  Patient is still have GI pain that

## 2023-07-27 NOTE — PATIENT INSTRUCTIONS
Finish Trulicity pens on hand and then start Mounjaro 2.5 mg once a week or Ozempic 0.25 mg once a week based on coverage.   Continue metformin 2000 mg once a day and

## 2023-08-01 ENCOUNTER — TELEPHONE (OUTPATIENT)
Dept: FAMILY MEDICINE CLINIC | Age: 38
End: 2023-08-01

## 2023-08-01 ENCOUNTER — TELEPHONE (OUTPATIENT)
Facility: HOSPITAL | Age: 38
End: 2023-08-01

## 2023-08-01 DIAGNOSIS — E11.9 CONTROLLED TYPE 2 DIABETES MELLITUS WITHOUT COMPLICATION, WITH LONG-TERM CURRENT USE OF INSULIN (HCC): ICD-10-CM

## 2023-08-01 DIAGNOSIS — E11.9 TYPE 2 DIABETES MELLITUS WITHOUT COMPLICATION, WITH LONG-TERM CURRENT USE OF INSULIN (HCC): Primary | ICD-10-CM

## 2023-08-01 DIAGNOSIS — Z79.4 CONTROLLED TYPE 2 DIABETES MELLITUS WITHOUT COMPLICATION, WITH LONG-TERM CURRENT USE OF INSULIN (HCC): ICD-10-CM

## 2023-08-01 DIAGNOSIS — Z79.4 TYPE 2 DIABETES MELLITUS WITHOUT COMPLICATION, WITH LONG-TERM CURRENT USE OF INSULIN (HCC): Primary | ICD-10-CM

## 2023-08-01 RX ORDER — INSULIN DEGLUDEC INJECTION 100 U/ML
24 INJECTION, SOLUTION SUBCUTANEOUS
Qty: 10 ML | Refills: 5 | Status: SHIPPED
Start: 2023-08-01

## 2023-08-01 NOTE — TELEPHONE ENCOUNTER
Medication Management Service    Date: 8/1/2023  Patient's Name: Karol Acuna YOB: 1985            _____________________________________________________________________________________________    Patient called in to report hypoglycemic events over the past couple days. Patient was recently changed to [facility] from [facility]. Patient has been have BG <70 since Saturday night. Patient has treated for hypoglycemia (Treatment of hypoglycemia (low blood sugars): if sugar is below 70 mg/dL, treat with 15 grams of simple sugar [rapid acting carb] (3-4 glucose tablets, 4 oz of regular juice, 1/2 can of pop, 5 sugar-containing candies, 1 Tablespoon of honey). RECHECK in 15 minutes. If above 70 mg/dL, have small meal or snack. If not above 70 mg/dL, repeat the 15 grams of simple carbs until above 70 mg/dL). Patient instructed to reduce Tresiba to 25 units daily due to hypoglycemia. Patient to continue other medications as prescribed. Patient prescribed glucagon for severe hypoglycemia. Patient verbalized understanding. Patient rescheduled appointment for 8/31/2023. Patient encouraged to continue to record all blood sugars to continue to report in upcoming calls.     Michelle Watt PharmD, [facility]  Ambulatory Clinical Pharmacist   Specialty Medication Service/[facility]  Phone: [phone]  [facility]  Phone: [phone] option 1    For Pharmacy Admin Tracking Only    Program: Medical Group  CPA in place:  Yes  Recommendation Provided To: Patient/Caregiver: 3 via Telephone  Intervention Detail: Dose Adjustment: 1, reason: GONZALO, Therapy De-escalation, New Rx: 1, reason: GONZALO, Needs Additional Therapy, and Scheduled Appointment  Intervention Accepted By: Patient/Caregiver: 3  Gap Closed?: Yes   Time Spent (min): 45

## 2023-08-01 NOTE — TELEPHONE ENCOUNTER
Patient called and requested to speak with Reyna Aguilar PharmD regarding blood sugar crashes since switching her medications. Spoke with Bipin Becker and he will call patient now.      Cristian LindseyD [facility]  Ambulatory Clinical Pharmacist  Specialty Medication Services  Phone: [phone] option #4  Fax: [phone]    For Pharmacy Admin Tracking Only    Program: Medication Management    Time Spent (min): 5

## 2023-08-27 DIAGNOSIS — E11.9 TYPE 2 DIABETES MELLITUS WITHOUT COMPLICATION, WITH LONG-TERM CURRENT USE OF INSULIN (HCC): ICD-10-CM

## 2023-08-27 DIAGNOSIS — Z79.4 TYPE 2 DIABETES MELLITUS WITHOUT COMPLICATION, WITH LONG-TERM CURRENT USE OF INSULIN (HCC): ICD-10-CM

## 2023-08-29 NOTE — TELEPHONE ENCOUNTER
Patient stated she has a appt with Nadir Copping the pharmacist on Thursday and she will let you know if she is ready for the increase dosage of th mounjaro

## 2023-08-31 ENCOUNTER — PHARMACY VISIT (OUTPATIENT)
Dept: FAMILY MEDICINE CLINIC | Age: 38
End: 2023-08-31

## 2023-08-31 DIAGNOSIS — E11.9 TYPE 2 DIABETES MELLITUS WITHOUT COMPLICATION, WITH LONG-TERM CURRENT USE OF INSULIN (HCC): Primary | ICD-10-CM

## 2023-08-31 DIAGNOSIS — Z79.4 TYPE 2 DIABETES MELLITUS WITHOUT COMPLICATION, WITH LONG-TERM CURRENT USE OF INSULIN (HCC): Primary | ICD-10-CM

## 2023-08-31 DIAGNOSIS — Z79.4 CONTROLLED TYPE 2 DIABETES MELLITUS WITHOUT COMPLICATION, WITH LONG-TERM CURRENT USE OF INSULIN (HCC): ICD-10-CM

## 2023-08-31 DIAGNOSIS — E11.9 CONTROLLED TYPE 2 DIABETES MELLITUS WITHOUT COMPLICATION, WITH LONG-TERM CURRENT USE OF INSULIN (HCC): ICD-10-CM

## 2023-08-31 PROCEDURE — 99999 PR OFFICE/OUTPT VISIT,PROCEDURE ONLY: CPT | Performed by: PHARMACIST

## 2023-08-31 RX ORDER — INSULIN DEGLUDEC INJECTION 100 U/ML
18 INJECTION, SOLUTION SUBCUTANEOUS
Qty: 10 ML | Refills: 5 | Status: SHIPPED
Start: 2023-08-31

## 2023-08-31 RX ORDER — TIRZEPATIDE 5 MG/.5ML
5 INJECTION, SOLUTION SUBCUTANEOUS WEEKLY
Qty: 2 ML | Refills: 3 | Status: SHIPPED | OUTPATIENT
Start: 2023-08-31

## 2023-08-31 RX ORDER — GLUCAGON INJECTION, SOLUTION 1 MG/.2ML
1 INJECTION, SOLUTION SUBCUTANEOUS PRN
Qty: 0.4 ML | Refills: 3 | Status: SHIPPED | OUTPATIENT
Start: 2023-08-31

## 2023-08-31 NOTE — PATIENT INSTRUCTIONS
Increase Mounjaro to 5 mg once weekly  Continue Jardiance 10 mg once daily, metformin 2000 mg daily  Decrease Tresiba to 18 units once daily

## 2023-08-31 NOTE — PROGRESS NOTES
with)  Beverage(s): coffee, water with liquid IV, soda twice a week regular (dr bonner) now drinking diet dr bonner  Alcohol Consumption? Yes - socially 3 times a month  Prior visit with dietician: yes - did not find beneficial     PHYSICAL ACTIVITY  Patient stated that she has not been at the gym as regularly due to her  having back pain and not being able to join her. However, the patient stated that her  is now beginning to feel better and they plan to resume going back to the gym more regularly    [address] [facility] in home medications including OTCs: No  - Barriers to affording/accessing medications: No  - Uses pillbox/medication organizing method: Yes  - Adhering to current medication regimen: Yes    IMMUNIZATIONS  Immunization History   Administered Date(s) Administered    COVID-19, MODERNA BLUE border, Primary or Immunocompromised, (age 12y+), IM, 100 mcg/0.5mL 10/31/2021, 11/28/2021    Influenza Vaccine, unspecified formulation 11/06/2015    Influenza Virus Vaccine 11/06/2015    Influenza, AFLURIA (age 1 yrs+), FLUZONE, (age 10 mo+), MDV, 0.5mL 09/08/2017    Influenza, FLUCELVAX, (age 10 mo+), MDCK, PF, 0.5mL 12/02/2021, 11/07/2022    Pneumococcal, PCV-13, PREVNAR 15, (age 6w+), IM, 0.5mL 09/08/2017    TD 5LF, Ebb Axe, (age 7y+), IM, 0.5mL 09/02/2005    TDaP, ADACEL (age 6y-58y), Caceres Stewart (age 10y+), IM, 0.5mL 10/31/2014        Immunizations:   Influenza: complete  PCV20: DUE   Shingrix: not due   Hepatitis B series: consider     HEALTH MAINTENANCE  The ASCVD Risk score (Marysol DK, et al., 2019) failed to calculate for the following reasons: The 2019 ASCVD risk score is only valid for ages 36 to 78  Cardiovascular risk factors: diabetes mellitus, dyslipidemia, family history of premature cardiovascular disease, obesity (BMI >= 30 kg/m2), and sedentary lifestyle  Aspirin: No  Statin: Yes atorvastatin  ACEi/ARB: Yes lisinopril  Nicotine use:  No

## 2023-09-01 RX ORDER — TIRZEPATIDE 2.5 MG/.5ML
INJECTION, SOLUTION SUBCUTANEOUS
Qty: 2 ML | Refills: 0 | OUTPATIENT
Start: 2023-09-01

## 2023-09-12 DIAGNOSIS — E11.9 CONTROLLED TYPE 2 DIABETES MELLITUS WITHOUT COMPLICATION, WITHOUT LONG-TERM CURRENT USE OF INSULIN (HCC): ICD-10-CM

## 2023-09-12 DIAGNOSIS — M79.641 BILATERAL HAND PAIN: ICD-10-CM

## 2023-09-12 DIAGNOSIS — M79.642 BILATERAL HAND PAIN: ICD-10-CM

## 2023-09-12 RX ORDER — METFORMIN HYDROCHLORIDE 500 MG/1
TABLET, EXTENDED RELEASE ORAL
Qty: 120 TABLET | Refills: 1 | Status: SHIPPED | OUTPATIENT
Start: 2023-09-12

## 2023-09-12 RX ORDER — NAPROXEN 500 MG/1
TABLET ORAL
Qty: 60 TABLET | Refills: 5 | Status: SHIPPED | OUTPATIENT
Start: 2023-09-12

## 2023-09-12 NOTE — TELEPHONE ENCOUNTER
Johnna Ivey is calling to request a refill on the following medication(s):    Last Visit Date (If Applicable):  2/1/2783    Next Visit Date:    12/7/2023    Medication Request:  Requested Prescriptions     Pending Prescriptions Disp Refills    naproxen (NAPROSYN) 500 MG tablet [Pharmacy Med Name: NAPROXEN 500 MG TABLET] 60 tablet 5     Sig: TAKE ONE TABLET BY MOUTH TWICE A DAY WITH FOOD    metFORMIN (GLUCOPHAGE-XR) 500 MG extended release tablet [Pharmacy Med Name: METFORMIN HCL  MG TABLET] 120 tablet 1     Sig: TAKE 2 TABLETS BY MOUTH EVERY MORNING AND TAKE ONE TABLETS BY MOUTH EVERY EVENING FOR 7 DAYS, THEN INCREASE TO 2 TABLETS BY MOUTH EVERY MORNING AND TAKE 2 TABLETS BY MOUTH EVERY EVENING

## 2023-09-26 ENCOUNTER — TELEMEDICINE (OUTPATIENT)
Dept: PULMONOLOGY | Age: 38
End: 2023-09-26
Payer: COMMERCIAL

## 2023-09-26 DIAGNOSIS — E66.01 MORBID OBESITY WITH BMI OF 50.0-59.9, ADULT (HCC): ICD-10-CM

## 2023-09-26 DIAGNOSIS — G47.33 OSA ON CPAP: Primary | ICD-10-CM

## 2023-09-26 PROCEDURE — 99213 OFFICE O/P EST LOW 20 MIN: CPT | Performed by: INTERNAL MEDICINE

## 2023-09-26 RX ORDER — FLUTICASONE PROPIONATE 50 MCG
2 SPRAY, SUSPENSION (ML) NASAL DAILY
Qty: 16 G | Refills: 5 | Status: SHIPPED | OUTPATIENT
Start: 2023-09-26

## 2023-09-26 ASSESSMENT — SLEEP AND FATIGUE QUESTIONNAIRES
HOW LIKELY ARE YOU TO NOD OFF OR FALL ASLEEP IN A CAR, WHILE STOPPED FOR A FEW MINUTES IN TRAFFIC: 0
HOW LIKELY ARE YOU TO NOD OFF OR FALL ASLEEP WHEN YOU ARE A PASSENGER IN A CAR FOR AN HOUR WITHOUT A BREAK: 1
ESS TOTAL SCORE: 7
HOW LIKELY ARE YOU TO NOD OFF OR FALL ASLEEP WHILE LYING DOWN TO REST IN THE AFTERNOON WHEN CIRCUMSTANCES PERMIT: 3
HOW LIKELY ARE YOU TO NOD OFF OR FALL ASLEEP WHILE SITTING INACTIVE IN A PUBLIC PLACE: 0
HOW LIKELY ARE YOU TO NOD OFF OR FALL ASLEEP WHILE SITTING AND READING: 1
HOW LIKELY ARE YOU TO NOD OFF OR FALL ASLEEP WHILE SITTING AND TALKING TO SOMEONE: 0
HOW LIKELY ARE YOU TO NOD OFF OR FALL ASLEEP WHILE SITTING QUIETLY AFTER LUNCH WITHOUT ALCOHOL: 1
HOW LIKELY ARE YOU TO NOD OFF OR FALL ASLEEP WHILE WATCHING TV: 1

## 2023-09-26 NOTE — PROGRESS NOTES
OUTPATIENT PULMONARY PROGHRESS NOTE    Virtual visit. MyChart visit. Patient:  Mukund Jolly  MRN: [id number]    Consulting Physician: Mis Cardenas MD  Reason for Consult: Obstructive sleep apnea  Primacy Care Physician: Mis Cardenas MD    HISTORY OF PRESENT ILLNESS:   The patient is a 41 y.o. female follow-up of obstructive sleep apnea    Since she was seen last time she has been doing well. She is compliant with CPAP. She use CPAP every night according to patient. She is on CPAP of 10 cm and she denies any problem she does not feel like that pressure is too high or too low. Currently patient she goes to sleep around 11 PM and does not sleep until 12 midnight she wake up 630 to 7 in the morning. She work until 4 to 4:30 PM.  Compliance data is available from 06/23/2023 to 09/20/2023 compliance is 99%. Average usage is 7 hours and 33 minutes and on CPAP of 10 cm average AHI 0.7. According to patient she is compliant with CPAP use CPAP every night CPAP use with nasal mask/pillows sleep is usually good but when she wake up in the morning she still feels tired which is no change. She does not doze off during the daytime she denies getting sleepy while driving. She does not take nap during the daytime but according to patient she drink coffee during the daytime some days are better some days she has to drink more coffee. She denies any weight loss or weight gain. She does have history of postnasal drip she takes Zyrtec once daily she has been out of Flonase and not taking at this time. She denies significant reflux symptoms currently. She denies shortness of breath denies cough denies wheezing denies nocturnal awakening with cough wheezing chest tightness or shortness of breath.       Initial office visit and evaluation on 05/26/2023  She has history of longstanding obstructive sleep apnea apparently the first time she was diagnosed with sleep apnea was 14 years ago when she had the first

## 2023-10-15 DIAGNOSIS — E11.9 CONTROLLED TYPE 2 DIABETES MELLITUS WITHOUT COMPLICATION, WITHOUT LONG-TERM CURRENT USE OF INSULIN (HCC): ICD-10-CM

## 2023-10-16 RX ORDER — PROCHLORPERAZINE 25 MG/1
SUPPOSITORY RECTAL
Qty: 3 EACH | Refills: 2 | Status: SHIPPED | OUTPATIENT
Start: 2023-10-16

## 2023-10-16 NOTE — TELEPHONE ENCOUNTER
Donny Morgan is calling to request a refill on the following medication(s):    Last Visit Date (If Applicable):  0/2/2674    Next Visit Date:    12/7/2023    Medication Request:  Requested Prescriptions     Pending Prescriptions Disp Refills    Continuous Blood Gluc Sensor (DEXCOM G6 SENSOR) MISC [Pharmacy Med Name: DEXCOM G6 SENSOR] 3 each 2     Sig: USE AS DIRECTED TO MEASURE BLOOD SUGAR - CHANGE EVERY 10 DAYS

## 2023-11-06 ENCOUNTER — APPOINTMENT (OUTPATIENT)
Dept: CT IMAGING | Age: 38
DRG: 392 | End: 2023-11-06
Payer: COMMERCIAL

## 2023-11-06 ENCOUNTER — TELEPHONE (OUTPATIENT)
Dept: FAMILY MEDICINE CLINIC | Age: 38
End: 2023-11-06

## 2023-11-06 ENCOUNTER — HOSPITAL ENCOUNTER (INPATIENT)
Age: 38
LOS: 1 days | Discharge: HOME | DRG: 392 | End: 2023-11-07
Attending: EMERGENCY MEDICINE | Admitting: INTERNAL MEDICINE
Payer: COMMERCIAL

## 2023-11-06 DIAGNOSIS — R10.9 ABDOMINAL PAIN, UNSPECIFIED ABDOMINAL LOCATION: Primary | ICD-10-CM

## 2023-11-06 DIAGNOSIS — E11.9 CONTROLLED TYPE 2 DIABETES MELLITUS WITHOUT COMPLICATION, WITHOUT LONG-TERM CURRENT USE OF INSULIN (HCC): ICD-10-CM

## 2023-11-06 PROBLEM — R19.7 DIARRHEA: Status: ACTIVE | Noted: 2023-11-06

## 2023-11-06 PROBLEM — R10.30 LOWER ABDOMINAL PAIN: Status: ACTIVE | Noted: 2023-11-06

## 2023-11-06 PROBLEM — K85.90 ACUTE PANCREATITIS WITHOUT INFECTION OR NECROSIS: Status: ACTIVE | Noted: 2023-11-06

## 2023-11-06 PROBLEM — R11.0 NAUSEA: Status: ACTIVE | Noted: 2023-11-06

## 2023-11-06 LAB
ALBUMIN SERPL-MCNC: 4.1 G/DL (ref 3.5–5.2)
ALP SERPL-CCNC: 67 U/L (ref 35–104)
ALT SERPL-CCNC: 29 U/L (ref 5–33)
ANION GAP SERPL CALCULATED.3IONS-SCNC: 11 MMOL/L (ref 9–17)
AST SERPL-CCNC: 17 U/L
BASOPHILS # BLD: 0.04 K/UL (ref 0–0.2)
BASOPHILS NFR BLD: 1 % (ref 0–2)
BILIRUB DIRECT SERPL-MCNC: 0.1 MG/DL
BILIRUB INDIRECT SERPL-MCNC: 0.3 MG/DL (ref 0–1)
BILIRUB SERPL-MCNC: 0.4 MG/DL (ref 0.3–1.2)
BILIRUB UR QL STRIP: NEGATIVE
BUN SERPL-MCNC: 19 MG/DL (ref 6–20)
BUN/CREAT SERPL: 27 (ref 9–20)
CALCIUM SERPL-MCNC: 9.2 MG/DL (ref 8.6–10.4)
CHLORIDE SERPL-SCNC: 103 MMOL/L (ref 98–107)
CLARITY UR: ABNORMAL
CO2 SERPL-SCNC: 24 MMOL/L (ref 20–31)
COLOR UR: YELLOW
CREAT SERPL-MCNC: 0.7 MG/DL (ref 0.5–0.9)
EOSINOPHIL # BLD: 0.28 K/UL (ref 0–0.44)
EOSINOPHILS RELATIVE PERCENT: 3 % (ref 1–4)
EPI CELLS #/AREA URNS HPF: ABNORMAL /HPF (ref 0–5)
ERYTHROCYTE [DISTWIDTH] IN BLOOD BY AUTOMATED COUNT: 13.2 % (ref 11.8–14.4)
GFR SERPL CREATININE-BSD FRML MDRD: >60 ML/MIN/1.73M2
GLUCOSE SERPL-MCNC: 97 MG/DL (ref 70–99)
GLUCOSE UR STRIP-MCNC: ABNORMAL MG/DL
HCG UR QL: NEGATIVE
HCT VFR BLD AUTO: 42.8 % (ref 36.3–47.1)
HGB BLD-MCNC: 13.7 G/DL (ref 11.9–15.1)
HGB UR QL STRIP.AUTO: NEGATIVE
IMM GRANULOCYTES # BLD AUTO: 0.03 K/UL (ref 0–0.3)
IMM GRANULOCYTES NFR BLD: 0 %
KETONES UR STRIP-MCNC: NEGATIVE MG/DL
LEUKOCYTE ESTERASE UR QL STRIP: NEGATIVE
LIPASE SERPL-CCNC: 831 U/L (ref 13–60)
LYMPHOCYTES NFR BLD: 2.25 K/UL (ref 1.1–3.7)
LYMPHOCYTES RELATIVE PERCENT: 26 % (ref 24–43)
MAGNESIUM SERPL-MCNC: 1.8 MG/DL (ref 1.6–2.6)
MCH RBC QN AUTO: 28.2 PG (ref 25.2–33.5)
MCHC RBC AUTO-ENTMCNC: 32 G/DL (ref 28.4–34.8)
MCV RBC AUTO: 88.1 FL (ref 82.6–102.9)
MONOCYTES NFR BLD: 0.53 K/UL (ref 0.1–1.2)
MONOCYTES NFR BLD: 6 % (ref 3–12)
MUCOUS THREADS URNS QL MICRO: ABNORMAL
NEUTROPHILS NFR BLD: 64 % (ref 36–65)
NEUTS SEG NFR BLD: 5.71 K/UL (ref 1.5–8.1)
NITRITE UR QL STRIP: NEGATIVE
NRBC BLD-RTO: 0 PER 100 WBC
PH UR STRIP: 5 [PH] (ref 5–8)
PLATELET # BLD AUTO: 235 K/UL (ref 138–453)
PMV BLD AUTO: 12.2 FL (ref 8.1–13.5)
POTASSIUM SERPL-SCNC: 4.1 MMOL/L (ref 3.7–5.3)
PROT SERPL-MCNC: 7.1 G/DL (ref 6.4–8.3)
PROT UR STRIP-MCNC: NEGATIVE MG/DL
RBC # BLD AUTO: 4.86 M/UL (ref 3.95–5.11)
RBC #/AREA URNS HPF: ABNORMAL /HPF (ref 0–2)
SODIUM SERPL-SCNC: 138 MMOL/L (ref 135–144)
SP GR UR STRIP: 1.09 (ref 1–1.03)
UROBILINOGEN UR STRIP-ACNC: NORMAL EU/DL (ref 0–1)
WBC #/AREA URNS HPF: ABNORMAL /HPF (ref 0–5)
WBC OTHER # BLD: 8.8 K/UL (ref 3.5–11.3)

## 2023-11-06 PROCEDURE — 87635 SARS-COV-2 COVID-19 AMP PRB: CPT

## 2023-11-06 PROCEDURE — 85025 COMPLETE CBC W/AUTO DIFF WBC: CPT

## 2023-11-06 PROCEDURE — 96374 THER/PROPH/DIAG INJ IV PUSH: CPT

## 2023-11-06 PROCEDURE — 96375 TX/PRO/DX INJ NEW DRUG ADDON: CPT

## 2023-11-06 PROCEDURE — 2580000003 HC RX 258: Performed by: NURSE PRACTITIONER

## 2023-11-06 PROCEDURE — 99285 EMERGENCY DEPT VISIT HI MDM: CPT

## 2023-11-06 PROCEDURE — 83690 ASSAY OF LIPASE: CPT

## 2023-11-06 PROCEDURE — 81025 URINE PREGNANCY TEST: CPT

## 2023-11-06 PROCEDURE — 83735 ASSAY OF MAGNESIUM: CPT

## 2023-11-06 PROCEDURE — 1200000000 HC SEMI PRIVATE

## 2023-11-06 PROCEDURE — 2500000003 HC RX 250 WO HCPCS: Performed by: NURSE PRACTITIONER

## 2023-11-06 PROCEDURE — 99222 1ST HOSP IP/OBS MODERATE 55: CPT | Performed by: NURSE PRACTITIONER

## 2023-11-06 PROCEDURE — 81001 URINALYSIS AUTO W/SCOPE: CPT

## 2023-11-06 PROCEDURE — 74177 CT ABD & PELVIS W/CONTRAST: CPT

## 2023-11-06 PROCEDURE — 80076 HEPATIC FUNCTION PANEL: CPT

## 2023-11-06 PROCEDURE — 6360000004 HC RX CONTRAST MEDICATION: Performed by: NURSE PRACTITIONER

## 2023-11-06 PROCEDURE — 6360000002 HC RX W HCPCS: Performed by: NURSE PRACTITIONER

## 2023-11-06 PROCEDURE — 80048 BASIC METABOLIC PNL TOTAL CA: CPT

## 2023-11-06 RX ORDER — SODIUM CHLORIDE 0.9 % (FLUSH) 0.9 %
10 SYRINGE (ML) INJECTION PRN
Status: DISCONTINUED | OUTPATIENT
Start: 2023-11-06 | End: 2023-11-07 | Stop reason: HOSPADM

## 2023-11-06 RX ORDER — SODIUM CHLORIDE 9 MG/ML
INJECTION, SOLUTION INTRAVENOUS PRN
Status: DISCONTINUED | OUTPATIENT
Start: 2023-11-06 | End: 2023-11-07 | Stop reason: HOSPADM

## 2023-11-06 RX ORDER — SODIUM CHLORIDE, SODIUM LACTATE, POTASSIUM CHLORIDE, CALCIUM CHLORIDE 600; 310; 30; 20 MG/100ML; MG/100ML; MG/100ML; MG/100ML
INJECTION, SOLUTION INTRAVENOUS CONTINUOUS
Status: ACTIVE | OUTPATIENT
Start: 2023-11-06 | End: 2023-11-07

## 2023-11-06 RX ORDER — 0.9 % SODIUM CHLORIDE 0.9 %
1000 INTRAVENOUS SOLUTION INTRAVENOUS ONCE
Status: COMPLETED | OUTPATIENT
Start: 2023-11-06 | End: 2023-11-06

## 2023-11-06 RX ORDER — MAGNESIUM SULFATE IN WATER 40 MG/ML
2000 INJECTION, SOLUTION INTRAVENOUS PRN
Status: DISCONTINUED | OUTPATIENT
Start: 2023-11-06 | End: 2023-11-07 | Stop reason: HOSPADM

## 2023-11-06 RX ORDER — ONDANSETRON 4 MG/1
4 TABLET, ORALLY DISINTEGRATING ORAL EVERY 8 HOURS PRN
Status: DISCONTINUED | OUTPATIENT
Start: 2023-11-06 | End: 2023-11-07 | Stop reason: HOSPADM

## 2023-11-06 RX ORDER — SODIUM CHLORIDE 0.9 % (FLUSH) 0.9 %
5-40 SYRINGE (ML) INJECTION EVERY 12 HOURS SCHEDULED
Status: DISCONTINUED | OUTPATIENT
Start: 2023-11-06 | End: 2023-11-07 | Stop reason: HOSPADM

## 2023-11-06 RX ORDER — FLUTICASONE PROPIONATE 50 MCG
2 SPRAY, SUSPENSION (ML) NASAL DAILY
Status: DISCONTINUED | OUTPATIENT
Start: 2023-11-06 | End: 2023-11-07 | Stop reason: HOSPADM

## 2023-11-06 RX ORDER — POTASSIUM CHLORIDE 7.45 MG/ML
10 INJECTION INTRAVENOUS PRN
Status: DISCONTINUED | OUTPATIENT
Start: 2023-11-06 | End: 2023-11-07 | Stop reason: HOSPADM

## 2023-11-06 RX ORDER — 0.9 % SODIUM CHLORIDE 0.9 %
80 INTRAVENOUS SOLUTION INTRAVENOUS ONCE
Status: DISCONTINUED | OUTPATIENT
Start: 2023-11-06 | End: 2023-11-07 | Stop reason: HOSPADM

## 2023-11-06 RX ORDER — DEXTROSE MONOHYDRATE 100 MG/ML
INJECTION, SOLUTION INTRAVENOUS CONTINUOUS PRN
Status: DISCONTINUED | OUTPATIENT
Start: 2023-11-06 | End: 2023-11-07 | Stop reason: HOSPADM

## 2023-11-06 RX ORDER — POTASSIUM CHLORIDE 20 MEQ/1
40 TABLET, EXTENDED RELEASE ORAL PRN
Status: DISCONTINUED | OUTPATIENT
Start: 2023-11-06 | End: 2023-11-07 | Stop reason: HOSPADM

## 2023-11-06 RX ORDER — SODIUM CHLORIDE, SODIUM LACTATE, POTASSIUM CHLORIDE, CALCIUM CHLORIDE 600; 310; 30; 20 MG/100ML; MG/100ML; MG/100ML; MG/100ML
INJECTION, SOLUTION INTRAVENOUS CONTINUOUS
Status: DISCONTINUED | OUTPATIENT
Start: 2023-11-07 | End: 2023-11-07 | Stop reason: HOSPADM

## 2023-11-06 RX ORDER — ONDANSETRON 2 MG/ML
4 INJECTION INTRAMUSCULAR; INTRAVENOUS ONCE
Status: COMPLETED | OUTPATIENT
Start: 2023-11-06 | End: 2023-11-06

## 2023-11-06 RX ORDER — ONDANSETRON 2 MG/ML
4 INJECTION INTRAMUSCULAR; INTRAVENOUS EVERY 6 HOURS PRN
Status: DISCONTINUED | OUTPATIENT
Start: 2023-11-06 | End: 2023-11-07 | Stop reason: HOSPADM

## 2023-11-06 RX ORDER — INSULIN GLARGINE 100 [IU]/ML
14 INJECTION, SOLUTION SUBCUTANEOUS DAILY
Status: DISCONTINUED | OUTPATIENT
Start: 2023-11-07 | End: 2023-11-07 | Stop reason: HOSPADM

## 2023-11-06 RX ORDER — BUPROPION HYDROCHLORIDE 150 MG/1
300 TABLET ORAL DAILY
Status: DISCONTINUED | OUTPATIENT
Start: 2023-11-06 | End: 2023-11-07 | Stop reason: HOSPADM

## 2023-11-06 RX ORDER — SODIUM CHLORIDE 0.9 % (FLUSH) 0.9 %
5-40 SYRINGE (ML) INJECTION PRN
Status: DISCONTINUED | OUTPATIENT
Start: 2023-11-06 | End: 2023-11-07 | Stop reason: HOSPADM

## 2023-11-06 RX ORDER — INSULIN LISPRO 100 [IU]/ML
0-4 INJECTION, SOLUTION INTRAVENOUS; SUBCUTANEOUS EVERY 4 HOURS
Status: DISCONTINUED | OUTPATIENT
Start: 2023-11-06 | End: 2023-11-07 | Stop reason: HOSPADM

## 2023-11-06 RX ORDER — ENOXAPARIN SODIUM 100 MG/ML
30 INJECTION SUBCUTANEOUS 2 TIMES DAILY
Status: DISCONTINUED | OUTPATIENT
Start: 2023-11-06 | End: 2023-11-07 | Stop reason: HOSPADM

## 2023-11-06 RX ORDER — MORPHINE SULFATE 2 MG/ML
2 INJECTION, SOLUTION INTRAMUSCULAR; INTRAVENOUS
Status: DISCONTINUED | OUTPATIENT
Start: 2023-11-06 | End: 2023-11-07 | Stop reason: HOSPADM

## 2023-11-06 RX ORDER — MORPHINE SULFATE 4 MG/ML
4 INJECTION, SOLUTION INTRAMUSCULAR; INTRAVENOUS
Status: DISCONTINUED | OUTPATIENT
Start: 2023-11-06 | End: 2023-11-07 | Stop reason: HOSPADM

## 2023-11-06 RX ADMIN — ONDANSETRON 4 MG: 2 INJECTION INTRAMUSCULAR; INTRAVENOUS at 14:19

## 2023-11-06 RX ADMIN — SODIUM CHLORIDE, PRESERVATIVE FREE 20 MG: 5 INJECTION INTRAVENOUS at 14:19

## 2023-11-06 RX ADMIN — SODIUM CHLORIDE 1000 ML: 9 INJECTION, SOLUTION INTRAVENOUS at 14:18

## 2023-11-06 RX ADMIN — SODIUM CHLORIDE, PRESERVATIVE FREE 10 ML: 5 INJECTION INTRAVENOUS at 15:07

## 2023-11-06 RX ADMIN — SODIUM CHLORIDE, POTASSIUM CHLORIDE, SODIUM LACTATE AND CALCIUM CHLORIDE: 600; 310; 30; 20 INJECTION, SOLUTION INTRAVENOUS at 19:49

## 2023-11-06 RX ADMIN — IOPAMIDOL 75 ML: 755 INJECTION, SOLUTION INTRAVENOUS at 15:07

## 2023-11-06 RX ADMIN — Medication 80 ML: at 15:07

## 2023-11-06 ASSESSMENT — ENCOUNTER SYMPTOMS
SORE THROAT: 0
SHORTNESS OF BREATH: 0
CHEST TIGHTNESS: 0
COLOR CHANGE: 0
BACK PAIN: 0
DIARRHEA: 1
CONSTIPATION: 0
VOMITING: 0
NAUSEA: 1
ABDOMINAL PAIN: 1
COUGH: 0

## 2023-11-06 NOTE — TELEPHONE ENCOUNTER
Patient called in through [address] stating she has been having the following sx     Sx  severe stomach pain, no solid bowel movement, nausea, lower abdominal pain, dehydrated     She states the stomach pain is worst when she is digesting food      Duration last Monday but worse since Saturday     [address] [facility], [address] [place] [address] [phone]    Please advise

## 2023-11-06 NOTE — TELEPHONE ENCOUNTER
Do you think it could be moujaro? She's been on it for a couple months. She has a family history of being allergic to medication after taking for a little bit.

## 2023-11-06 NOTE — ED NOTES
Patient presents to ER from home due to diarrhea and Nausea. Patient states S/S starts last Monday. Patient states diarrhea, Patient states hurts to have BM feels like she is constipated and she has a blockage. Patient states nausea and it feels like she is going to vomit. Patient states stomach hurts when she eats. Patient is A/O x 4, equal chest expansion with non labored breathing, wheels locked,bed in lowest position.      Vivian Weaver RN  11/06/23 [id number]

## 2023-11-07 ENCOUNTER — APPOINTMENT (OUTPATIENT)
Dept: ULTRASOUND IMAGING | Age: 38
DRG: 392 | End: 2023-11-07
Payer: COMMERCIAL

## 2023-11-07 VITALS
RESPIRATION RATE: 17 BRPM | HEIGHT: 63 IN | BODY MASS INDEX: 51.91 KG/M2 | OXYGEN SATURATION: 100 % | TEMPERATURE: 98.1 F | SYSTOLIC BLOOD PRESSURE: 124 MMHG | DIASTOLIC BLOOD PRESSURE: 65 MMHG | HEART RATE: 84 BPM | WEIGHT: 293 LBS

## 2023-11-07 PROBLEM — R74.8 ELEVATED LIPASE: Status: ACTIVE | Noted: 2023-11-07

## 2023-11-07 PROBLEM — K52.9 COLITIS: Status: ACTIVE | Noted: 2023-11-06

## 2023-11-07 LAB
ALBUMIN SERPL-MCNC: 3.4 G/DL (ref 3.5–5.2)
ALP SERPL-CCNC: 53 U/L (ref 35–104)
ALT SERPL-CCNC: 22 U/L (ref 5–33)
ANION GAP SERPL CALCULATED.3IONS-SCNC: 10 MMOL/L (ref 9–17)
AST SERPL-CCNC: 14 U/L
BILIRUB SERPL-MCNC: 0.4 MG/DL (ref 0.3–1.2)
BUN SERPL-MCNC: 14 MG/DL (ref 6–20)
BUN/CREAT SERPL: 20 (ref 9–20)
CALCIUM SERPL-MCNC: 8.4 MG/DL (ref 8.6–10.4)
CHLORIDE SERPL-SCNC: 105 MMOL/L (ref 98–107)
CO2 SERPL-SCNC: 24 MMOL/L (ref 20–31)
CREAT SERPL-MCNC: 0.7 MG/DL (ref 0.5–0.9)
GFR SERPL CREATININE-BSD FRML MDRD: >60 ML/MIN/1.73M2
GLUCOSE SERPL-MCNC: 90 MG/DL (ref 70–99)
LIPASE SERPL-CCNC: 36 U/L (ref 13–60)
MAGNESIUM SERPL-MCNC: 1.8 MG/DL (ref 1.6–2.6)
POTASSIUM SERPL-SCNC: 3.4 MMOL/L (ref 3.7–5.3)
PROT SERPL-MCNC: 5.8 G/DL (ref 6.4–8.3)
SARS-COV-2 RDRP RESP QL NAA+PROBE: NOT DETECTED
SODIUM SERPL-SCNC: 139 MMOL/L (ref 135–144)
SPECIMEN DESCRIPTION: NORMAL
TRIGL SERPL-MCNC: 137 MG/DL

## 2023-11-07 PROCEDURE — 83690 ASSAY OF LIPASE: CPT

## 2023-11-07 PROCEDURE — 82784 ASSAY IGA/IGD/IGG/IGM EACH: CPT

## 2023-11-07 PROCEDURE — 83516 IMMUNOASSAY NONANTIBODY: CPT

## 2023-11-07 PROCEDURE — 36415 COLL VENOUS BLD VENIPUNCTURE: CPT

## 2023-11-07 PROCEDURE — 6370000000 HC RX 637 (ALT 250 FOR IP): Performed by: NURSE PRACTITIONER

## 2023-11-07 PROCEDURE — 6360000002 HC RX W HCPCS: Performed by: NURSE PRACTITIONER

## 2023-11-07 PROCEDURE — 84478 ASSAY OF TRIGLYCERIDES: CPT

## 2023-11-07 PROCEDURE — 82787 IGG 1 2 3 OR 4 EACH: CPT

## 2023-11-07 PROCEDURE — 2580000003 HC RX 258: Performed by: NURSE PRACTITIONER

## 2023-11-07 PROCEDURE — 80053 COMPREHEN METABOLIC PANEL: CPT

## 2023-11-07 PROCEDURE — 6360000002 HC RX W HCPCS: Performed by: INTERNAL MEDICINE

## 2023-11-07 PROCEDURE — 76705 ECHO EXAM OF ABDOMEN: CPT

## 2023-11-07 PROCEDURE — 83735 ASSAY OF MAGNESIUM: CPT

## 2023-11-07 PROCEDURE — 99238 HOSP IP/OBS DSCHRG MGMT 30/<: CPT | Performed by: INTERNAL MEDICINE

## 2023-11-07 PROCEDURE — 6370000000 HC RX 637 (ALT 250 FOR IP): Performed by: INTERNAL MEDICINE

## 2023-11-07 RX ORDER — METRONIDAZOLE 500 MG/100ML
500 INJECTION, SOLUTION INTRAVENOUS EVERY 8 HOURS
Status: DISCONTINUED | OUTPATIENT
Start: 2023-11-07 | End: 2023-11-07 | Stop reason: HOSPADM

## 2023-11-07 RX ORDER — METFORMIN HYDROCHLORIDE 500 MG/1
TABLET, EXTENDED RELEASE ORAL
Qty: 120 TABLET | Refills: 1 | Status: SHIPPED
Start: 2023-11-07 | End: 2023-11-08

## 2023-11-07 RX ORDER — LISINOPRIL 5 MG/1
5 TABLET ORAL DAILY
Status: DISCONTINUED | OUTPATIENT
Start: 2023-11-07 | End: 2023-11-07 | Stop reason: HOSPADM

## 2023-11-07 RX ORDER — CIPROFLOXACIN 2 MG/ML
400 INJECTION, SOLUTION INTRAVENOUS EVERY 12 HOURS
Status: DISCONTINUED | OUTPATIENT
Start: 2023-11-07 | End: 2023-11-07 | Stop reason: HOSPADM

## 2023-11-07 RX ORDER — ATORVASTATIN CALCIUM 20 MG/1
20 TABLET, FILM COATED ORAL NIGHTLY
Status: DISCONTINUED | OUTPATIENT
Start: 2023-11-07 | End: 2023-11-07 | Stop reason: HOSPADM

## 2023-11-07 RX ADMIN — SODIUM CHLORIDE, POTASSIUM CHLORIDE, SODIUM LACTATE AND CALCIUM CHLORIDE: 600; 310; 30; 20 INJECTION, SOLUTION INTRAVENOUS at 00:13

## 2023-11-07 RX ADMIN — SODIUM CHLORIDE, POTASSIUM CHLORIDE, SODIUM LACTATE AND CALCIUM CHLORIDE: 600; 310; 30; 20 INJECTION, SOLUTION INTRAVENOUS at 04:08

## 2023-11-07 RX ADMIN — CIPROFLOXACIN 400 MG: 2 INJECTION, SOLUTION INTRAVENOUS at 10:21

## 2023-11-07 RX ADMIN — METRONIDAZOLE 500 MG: 500 INJECTION, SOLUTION INTRAVENOUS at 11:28

## 2023-11-07 RX ADMIN — CHOLECALCIFEROL TAB 125 MCG (5000 UNIT) 5000 UNITS: 125 TAB at 09:07

## 2023-11-07 RX ADMIN — SODIUM CHLORIDE, POTASSIUM CHLORIDE, SODIUM LACTATE AND CALCIUM CHLORIDE: 600; 310; 30; 20 INJECTION, SOLUTION INTRAVENOUS at 07:43

## 2023-11-07 RX ADMIN — ENOXAPARIN SODIUM 30 MG: 100 INJECTION SUBCUTANEOUS at 09:08

## 2023-11-07 RX ADMIN — LISINOPRIL 5 MG: 5 TABLET ORAL at 14:25

## 2023-11-07 RX ADMIN — INSULIN GLARGINE 14 UNITS: 100 INJECTION, SOLUTION SUBCUTANEOUS at 09:08

## 2023-11-07 RX ADMIN — BUPROPION HYDROCHLORIDE 300 MG: 150 TABLET, EXTENDED RELEASE ORAL at 09:07

## 2023-11-07 NOTE — PLAN OF CARE
Problem: Discharge Planning  Goal: Discharge to home or other facility with appropriate resources  Outcome: Progressing  Flowsheets (Taken 11/6/2023 2005)  Discharge to home or other facility with appropriate resources: Identify barriers to discharge with patient and caregiver     Problem: Chronic Conditions and Co-morbidities  Goal: Patient's chronic conditions and co-morbidity symptoms are monitored and maintained or improved  Outcome: Progressing

## 2023-11-07 NOTE — CONSULTS
studies if she is able to pass stools while here. Outpatient MRI/MRCP +/- EUS. Outpatient EGD and colonoscopy. F/u in the office next week. No objection to d/c from a GI standpoint.      Electronically signed by LATASHA Salamanca, on 11/7/2023 at 12:00 PM   Discussed with Dr. Alma Enciso

## 2023-11-07 NOTE — PLAN OF CARE
Problem: Gastrointestinal - Adult  Goal: Minimal or absence of nausea and vomiting  Outcome: Progressing  Flowsheets (Taken 11/7/2023 1123)  Minimal or absence of nausea and vomiting:   Administer IV fluids as ordered to ensure adequate hydration   Maintain NPO status until nausea and vomiting are resolved   Administer ordered antiemetic medications as needed   Advance diet as tolerated, if ordered

## 2023-11-07 NOTE — DISCHARGE INSTR - DIET
Good nutrition is important when healing from an illness, injury, or surgery. Follow any nutrition recommendations given to you during your hospital stay. If you were given an oral nutrition supplement while in the hospital, continue to take this supplement at home. You can take it with meals, in-between meals, and/or before bedtime. These supplements can be purchased at most local grocery stores, pharmacies, and chain [employer]-stores.    If you have any questions about your diet or nutrition, call the hospital and ask for the dietitian      Diet as tolerated

## 2023-11-07 NOTE — CARE COORDINATION
Patient expects to discharge to: House  Plan for transportation at discharge: Family    Financial    Payor: Taj Sosa / Plan: Taj Sosa NAP CHOICE POS II / Product Type: *No Product type* /     Does insurance require precert for SNF: Yes    Potential assistance Purchasing Medications: No  Meds-to-Beds request: Yes      [facility] [id number] - [address], [address] [address]  Phone: [phone] Fax: [address] [facility], [address] [place] [address] [phone] Morris John [phone] [address] [place] [address]  Phone: [phone] Fax: [phone]      Notes:    Factors facilitating achievement of predicted outcomes: Motivated, Cooperative, and Pleasant    Barriers to discharge: Pain    Additional Case Management Notes: CM spoke with patient at bedside to discuss transitional planning. Patient plans to return home independently at discharge. Patient has glucometer and CPAP at home. Patient states that she will have transportation home and she verbalizes having no additional transitional needs at this time. The Plan for Transition of Care is related to the following treatment goals of Abdominal pain, unspecified abdominal location [R10.9]  Acute pancreatitis without infection or necrosis [U18.19]    IF APPLICABLE: The Patient and/or patient representative Pop Cabrera and her family were provided with a choice of provider and agrees with the discharge plan. Freedom of choice list with basic dialogue that supports the patient's individualized plan of care/goals and shares the quality data associated with the providers was provided to: Patient   Patient Representative Name:       The Patient and/or Patient Representative Agree with the Discharge Plan?  Yes    Kimmie Lara RN  Case Management Department  Ph: [phone]

## 2023-11-07 NOTE — ED PROVIDER NOTES
eMERGENCY dEPARTMENT eNCOUnter   Independent Attestation     Pt Name: Karol Acuna  MRN: [id number]  [address] [place] 1985  Date of evaluation: 11/6/23     Karol Acuna is a 41 y.o. female with CC: Abdominal Pain, Nausea, and Diarrhea      Based on the medical record the care appears appropriate. I was personally available for consultation in the Emergency Department.     Bowen Traore MD  Attending Emergency Physician                  Bowen Traore MD  11/06/23 6416
TAKE ONE TABLET BY MOUTH DAILY    [employer] PEN NEEDLES 31G X 6 MM MISC    USE ONE PEN NEEDLE TO INJECT TRESIBA DAILY    LISINOPRIL (PRINIVIL;ZESTRIL) 5 MG TABLET    TAKE ONE TABLET BY MOUTH DAILY    METFORMIN (GLUCOPHAGE-XR) 500 MG EXTENDED RELEASE TABLET    TAKE 2 TABLETS BY MOUTH EVERY MORNING AND TAKE ONE TABLETS BY MOUTH EVERY EVENING FOR 7 DAYS, THEN INCREASE TO 2 TABLETS BY MOUTH EVERY MORNING AND TAKE 2 TABLETS BY MOUTH EVERY EVENING    MISC.  DEVICES MISC    Cpap tubing, mouth piece and humidifier    NAPROXEN (NAPROSYN) 500 MG TABLET    TAKE ONE TABLET BY MOUTH TWICE A DAY WITH FOOD    TIRZEPATIDE (MOUNJARO) 5 MG/0.5ML SOPN SC INJECTION    Inject 0.5 mLs into the skin once a week    TRESIBA FLEXTOUCH 100 UNIT/ML SOPN    Inject 18 Units into the skin daily (with breakfast)       PAST MEDICAL HISTORY         Diagnosis Date    Anxiety and depression     Depression     Migraines     Obesity     PONV (postoperative nausea and vomiting)     Seasonal allergies     Sleep apnea     uses cpap nightly    Type 2 diabetes mellitus without complication ([address])        SURGICAL HISTORY           Procedure Laterality Date     SECTION      LAPAROSCOPY  2018    LAPAROSCOPY DIAGNOSTIC (N/A Abdomen), Lysis of adhesions    ND LAPS ABD PRTM&OMENTUM DX W/WO SPEC BR/WA SPX N/A 2018    LAPAROSCOPY DIAGNOSTIC performed by Russel Baron MD at [address] Final      lt wrist age 7yrs         FAMILY HISTORY           Problem Relation Age of Onset    High Blood Pressure Mother     Diabetes type 2  Mother     Cancer Maternal Aunt     Breast Cancer Maternal Aunt     Breast Cancer Paternal Aunt     Diabetes Paternal Uncle     Heart Attack Maternal Grandfather     High Blood Pressure Maternal Grandfather     Stroke Maternal Grandfather      Family Status   Relation Name Status    Mother  Alive    Father  Alive    [address]  (Not Specified)    [address]  (Not Specified)    MGM

## 2023-11-07 NOTE — DISCHARGE SUMMARY
[facility]  Office: 7900  1826, DO, Zulay Pierson, DO, Dorey Matthews, DO, Myke Guthrie Blood, DO, Marciano White MD, Gerard Murphy MD, Sylwia Ocampo MD, Jesus Mathew MD,  Marcela Kellogg MD, Abram Pereyra MD, Ebenezer Mclaughlin MD,  Catina Muir MD, Bret Jackman MD, Halie Cotton DO, Keisha Cat MD,  Archie Deutsch DO, Guilherme Quintero MD, Marie Patel MD, Jono De Leon MD, Ana Laura Pritchard MD,  Rajat Dahl MD, Bo Benson MD, Taina Roman MD, Barry Reynolds MD, Crystal Vincent MD, Janeen Segundo, DO, Luis Carlos Goff, DO, Dion Benavidez MD,  Sharri Ortiz MD, Romy Wood, CNP,  Andie Albrecht, CNP, Carlyle Joyce, CNP,  Chuck Vela, [facility], Rebecca Kebede, CNP, Marisol Pena, CNP, Emma Teresa, CNP, Sandra Mcfarlane, CNP, Ron Holley, CNP, Vivek Servin, CNP, Liss Benedict, CNS, Zoey Garrett, CNP, [facility], [facility]    Discharge Summary     Patient ID: Mukund Jolly  :  1985   MRN: [id number]     ACCOUNT:  [de-identified]   Patient's PCP: Mis Cardenas MD  Admit Date: 2023   Discharge Date: 2023     Length of Stay: 1  Code Status:  Full Code  Admitting Physician: Mika Escobedo DO  Discharge Physician: Mika Escobedo DO     Active Discharge Diagnoses:     Hospital Problem Lists:  Principal Problem:    Colitis  Active Problems: Morbid obesity with BMI of 50.0-59.9, adult (HCC)    Type 2 diabetes mellitus without complication, with long-term current use of insulin (HCC)    Obstructive sleep apnea syndrome    Lower abdominal pain    Nausea    Diarrhea    Elevated lipase  Resolved Problems:    * No resolved hospital problems.  *      Admission Condition:  fair     Discharged Condition: stable    Hospital Stay:     Hospital Course:  Mukund Jolly is a 41 y.o. female who was admitted for the management of  Colitis , presented to ER with

## 2023-11-08 ENCOUNTER — CARE COORDINATION (OUTPATIENT)
Dept: CASE MANAGEMENT | Age: 38
End: 2023-11-08

## 2023-11-08 DIAGNOSIS — E11.9 CONTROLLED TYPE 2 DIABETES MELLITUS WITHOUT COMPLICATION, WITHOUT LONG-TERM CURRENT USE OF INSULIN (HCC): ICD-10-CM

## 2023-11-08 RX ORDER — METFORMIN HYDROCHLORIDE 500 MG/1
TABLET, EXTENDED RELEASE ORAL
Qty: 120 TABLET | Refills: 1 | Status: SHIPPED | OUTPATIENT
Start: 2023-11-08

## 2023-11-08 NOTE — TELEPHONE ENCOUNTER
Gregory Toure is calling to request a refill on the following medication(s):    Last Visit Date (If Applicable):  4/6/9282    Next Visit Date:    12/7/2023    Medication Request:  Requested Prescriptions     Pending Prescriptions Disp Refills    metFORMIN (GLUCOPHAGE-XR) 500 MG extended release tablet [Pharmacy Med Name: METFORMIN HCL  MG TABLET] 120 tablet 1     Sig: TAKE 2 TABLETS BY MOUTH EVERY MORNING AND TAKE ONE TABLET BY MOUTH EVERY EVENING FOR 7 DAYS, THEN INCREASE TO TAKE 2 TABLETS BY MOUTH EVERY MORNING AND TAKE TWO TABLETS BY MOUTH EVERY EVENING

## 2023-11-08 NOTE — CARE COORDINATION
Care Transitions Outreach Attempt #1  Initial 24 hour call    Call within 2 business days of discharge: Yes   Attempted to reach patient for transitions of care follow up. Unable to reach patient. HIPAA compliant message left on  requesting a return call. Patient: Jenifer Lopez Patient : 1985 MRN: [id number]    Last Discharge Facility       Date Complaint Diagnosis Description Type Department Provider    23 Abdominal Pain; Nausea; Diarrhea Abdominal pain, unspecified abdominal location . .. ED to Hosp-Admission (Discharged) (ADMITTED) Alessio Mercer, DO; Elenita, . .. Was this an external facility discharge?  No Discharge Facility: [facility]    Noted following upcoming appointments from discharge chart review:   [facility] follow up appointment(s):   Future Appointments   Date Time Provider [address]   2023  8:00 AM Tammie Bang MD [facility] MED [facility]   2023  8:00 AM Valeria Fernandes [facility] MED [facility]   2024  9:00 AM Zahra Gomez MD Resp Spec [address] [facility] LPN  Care Transition Nurse

## 2023-11-09 ENCOUNTER — CARE COORDINATION (OUTPATIENT)
Dept: CASE MANAGEMENT | Age: 38
End: 2023-11-09

## 2023-11-09 LAB
GLIADIN IGA SER IA-ACNC: 1.5 U/ML
GLIADIN IGG SER IA-ACNC: 1.4 U/ML
IGA SERPL-MCNC: 134 MG/DL (ref 70–400)
IGG 1: 263 MG/DL (ref 240–1118)
IGG 2: 308 MG/DL (ref 124–549)
IGG 3: 28 MG/DL (ref 21–134)
IGG4 SER-MCNC: 10 MG/DL (ref 1–123)
TTG IGA SER IA-ACNC: 0.3 U/ML

## 2023-11-09 NOTE — CARE COORDINATION
Care Transitions Outreach Attempt #2    Call within 2 business days of discharge: Yes   Patient returned call to writer and left message. Writer returned call and had to leave another message requesting a return call. Patient: Mingo Liao Patient : 1985 MRN: [id number]    Last Discharge Facility       Date Complaint Diagnosis Description Type Department Provider    23 Abdominal Pain; Nausea; Diarrhea Abdominal pain, unspecified abdominal location . .. ED to Hosp-Admission (Discharged) (ADMITTED) [facility] Escobedo, Mirlande Barth DO; Elenita, . .. Was this an external facility discharge?  No Discharge Facility: [facility]    Noted following upcoming appointments from discharge chart review:   [facility] follow up appointment(s):   Future Appointments   Date Time Provider [address] [place] [address]   2023  8:00 AM [facility]   2023  8:00 AM [facility]   2024  9:00 AM Winnie Saucedo MD Resp Spec [address] [facility] LPN  Care Transition Nurse  [phone]

## 2023-11-09 NOTE — CARE COORDINATION
Care Transitions Outreach Attempt #2    Call within 2 business days of discharge: Yes   Attempt #2 to reach patient for transitions of care follow up. Unable to reach patient. Left  requesting call back. CTN sign off if no return call received. Patient: Gregory Toure Patient : 1985 MRN: [id number]    Last Discharge Facility       Date Complaint Diagnosis Description Type Department Provider    23 Abdominal Pain; Nausea; Diarrhea Abdominal pain, unspecified abdominal location . .. ED to Hosp-Admission (Discharged) (ADMITTED) [facility] Escobedo, Nava Mcfarlane, DO; Elenita, . .. Was this an external facility discharge? No Discharge Facility Name: [facility]    Noted following upcoming appointments from discharge chart review:   [facility] follow up appointment(s):   Future Appointments   Date Time Provider [address]   2023  8:00 AM [facility]   2023  8:00 AM [facility]   2024  9:00 AM Alanna Thomason MD Resp Spec Ashley Verdugo, RN BSN [facility]  Care Transitions Nurse  [phone]   [email or web address]

## 2023-11-14 DIAGNOSIS — Z79.4 CONTROLLED TYPE 2 DIABETES MELLITUS WITHOUT COMPLICATION, WITH LONG-TERM CURRENT USE OF INSULIN (HCC): ICD-10-CM

## 2023-11-14 DIAGNOSIS — E11.9 CONTROLLED TYPE 2 DIABETES MELLITUS WITHOUT COMPLICATION, WITH LONG-TERM CURRENT USE OF INSULIN (HCC): ICD-10-CM

## 2023-11-14 RX ORDER — EMPAGLIFLOZIN 10 MG/1
TABLET, FILM COATED ORAL
Qty: 30 TABLET | Refills: 5 | Status: SHIPPED | OUTPATIENT
Start: 2023-11-14

## 2023-11-19 DIAGNOSIS — F33.42 RECURRENT MAJOR DEPRESSIVE DISORDER, IN FULL REMISSION (HCC): ICD-10-CM

## 2023-11-21 ENCOUNTER — TELEPHONE (OUTPATIENT)
Dept: GASTROENTEROLOGY | Age: 38
End: 2023-11-21

## 2023-11-21 RX ORDER — BUPROPION HYDROCHLORIDE 300 MG/1
TABLET ORAL
Qty: 30 TABLET | Refills: 5 | Status: SHIPPED | OUTPATIENT
Start: 2023-11-21

## 2023-11-21 NOTE — TELEPHONE ENCOUNTER
Procedure scheduled/Hamdani  EUS with path  Dx:  Pancreatitis/Abdominal pain  Ordering:  Faith Olivas CNP  1/9/2024   8:30am/arrive 7:00am         [address]    EUS instructions mailed to patient. Patient advised by phone/mail  [facility] gastro records in media.

## 2023-12-07 ENCOUNTER — OFFICE VISIT (OUTPATIENT)
Dept: FAMILY MEDICINE CLINIC | Age: 38
End: 2023-12-07
Payer: COMMERCIAL

## 2023-12-07 ENCOUNTER — HOSPITAL ENCOUNTER (OUTPATIENT)
Age: 38
Setting detail: SPECIMEN
Discharge: HOME | End: 2023-12-07

## 2023-12-07 VITALS
DIASTOLIC BLOOD PRESSURE: 70 MMHG | SYSTOLIC BLOOD PRESSURE: 110 MMHG | WEIGHT: 290.4 LBS | HEART RATE: 96 BPM | BODY MASS INDEX: 51.44 KG/M2

## 2023-12-07 DIAGNOSIS — E78.2 MIXED HYPERLIPIDEMIA: ICD-10-CM

## 2023-12-07 DIAGNOSIS — K85.30 DRUG-INDUCED ACUTE PANCREATITIS WITHOUT INFECTION OR NECROSIS: Primary | ICD-10-CM

## 2023-12-07 DIAGNOSIS — E11.9 CONTROLLED TYPE 2 DIABETES MELLITUS WITHOUT COMPLICATION, WITHOUT LONG-TERM CURRENT USE OF INSULIN (HCC): ICD-10-CM

## 2023-12-07 DIAGNOSIS — E55.9 VITAMIN D DEFICIENCY: ICD-10-CM

## 2023-12-07 DIAGNOSIS — R74.8 ELEVATED LIVER ENZYMES: ICD-10-CM

## 2023-12-07 DIAGNOSIS — I10 ESSENTIAL HYPERTENSION: ICD-10-CM

## 2023-12-07 DIAGNOSIS — Z23 NEED FOR INFLUENZA VACCINATION: ICD-10-CM

## 2023-12-07 DIAGNOSIS — E66.01 MORBID OBESITY DUE TO EXCESS CALORIES (HCC): ICD-10-CM

## 2023-12-07 PROBLEM — K85.90 PANCREATITIS, ACUTE: Status: ACTIVE | Noted: 2023-12-07

## 2023-12-07 LAB
25(OH)D3 SERPL-MCNC: 61.1 NG/ML (ref 30–100)
CHOLEST SERPL-MCNC: 155 MG/DL (ref 0–199)
CHOLESTEROL/HDL RATIO: 3
CREAT UR-MCNC: 140 MG/DL (ref 28–217)
HBA1C MFR BLD: 6.3 %
HDLC SERPL-MCNC: 52 MG/DL
LDLC SERPL CALC-MCNC: 74 MG/DL (ref 0–100)
TOTAL PROTEIN, URINE: 8 MG/DL
TRIGL SERPL-MCNC: 147 MG/DL (ref 0–149)
URINE TOTAL PROTEIN CREATININE RATIO: 0.06 (ref 0–0.2)
VLDLC SERPL CALC-MCNC: 29 MG/DL

## 2023-12-07 PROCEDURE — 83036 HEMOGLOBIN GLYCOSYLATED A1C: CPT | Performed by: FAMILY MEDICINE

## 2023-12-07 PROCEDURE — 3044F HG A1C LEVEL LT 7.0%: CPT | Performed by: FAMILY MEDICINE

## 2023-12-07 PROCEDURE — 3078F DIAST BP <80 MM HG: CPT | Performed by: FAMILY MEDICINE

## 2023-12-07 PROCEDURE — 3074F SYST BP LT 130 MM HG: CPT | Performed by: FAMILY MEDICINE

## 2023-12-07 PROCEDURE — 99214 OFFICE O/P EST MOD 30 MIN: CPT | Performed by: FAMILY MEDICINE

## 2023-12-07 PROCEDURE — 90471 IMMUNIZATION ADMIN: CPT | Performed by: FAMILY MEDICINE

## 2023-12-07 PROCEDURE — 90674 CCIIV4 VAC NO PRSV 0.5 ML IM: CPT | Performed by: FAMILY MEDICINE

## 2023-12-07 RX ORDER — METFORMIN HYDROCHLORIDE 500 MG/1
500 TABLET, EXTENDED RELEASE ORAL
Qty: 90 TABLET | Refills: 3 | Status: SHIPPED
Start: 2023-12-07

## 2023-12-07 ASSESSMENT — ENCOUNTER SYMPTOMS
ALLERGIC/IMMUNOLOGIC NEGATIVE: 1
DIARRHEA: 0
SHORTNESS OF BREATH: 0
BLOOD IN STOOL: 0
ABDOMINAL PAIN: 1
CONSTIPATION: 0
COUGH: 0
EYES NEGATIVE: 1

## 2023-12-07 NOTE — PROGRESS NOTES
nausea and vomiting)     Seasonal allergies     Sleep apnea     uses cpap nightly    Type 2 diabetes mellitus without complication (HCC)        FAMILY HISTORY    Family History   Problem Relation Age of Onset    High Blood Pressure Mother     Diabetes type 2  Mother     Heart Attack Maternal Grandfather     High Blood Pressure Maternal Grandfather     Stroke Maternal Grandfather     Cancer Maternal Aunt     Breast Cancer Maternal Aunt     Breast Cancer Paternal Aunt     Diabetes Paternal Uncle        SOCIAL HISTORY    Social History     Socioeconomic History    Marital status:      Spouse name: None    Number of children: 2    Years of education: None    Highest education level: None   Occupational History    Occupation:      Employer: [employer]   Tobacco Use    Smoking status: Former     Packs/day: 0.50     Years: 10.00     Additional pack years: 0.00     Total pack years: 5.00     Types: Cigarettes     Quit date:      Years since quittin.9    Smokeless tobacco: Never    Tobacco comments:     quit smoking 4 yrs ago   Vaping Use    Vaping Use: Former   Substance and Sexual Activity    Alcohol use: Yes     Comment: social    Drug use: No    Sexual activity: Yes     Partners: Male     Social Determinants of Health     Financial Resource Strain: Medium Risk (3/7/2023)    Overall Financial Resource Strain (CARDIA)     Difficulty of Paying Living Expenses: Somewhat hard    Transportation Problems ([address] [place] [address])   Housing Stability: Low Risk  (2023)    Housing Stability Vital Sign     Unable to Pay for Housing in the Last Year: No     Number of Places Lived in the Last Year: 1     Unstable Housing in the Last Year: No       SURGICAL HISTORY    Past Surgical History:   Procedure Laterality Date     SECTION      LAPAROSCOPY  2018    LAPAROSCOPY DIAGNOSTIC (N/A Abdomen), Lysis of adhesions    SD LAPS ABD PRTM&OMENTUM DX W/WO SPEC BR/WA SPX N/A 2018    LAPAROSCOPY

## 2023-12-13 ENCOUNTER — TELEPHONE (OUTPATIENT)
Dept: FAMILY MEDICINE CLINIC | Age: 38
End: 2023-12-13

## 2023-12-13 NOTE — TELEPHONE ENCOUNTER
Medication Management Service    Date: 12/13/2023  Patient's Name: Kalina Conroy YOB: 1985            _____________________________________________________________________________________________    Patient rescheduled for DM management appointment 1/16/2024. Patient to start taking 5 units of Tresiba daily to help avoid hypoglycemic events.     Davey Vega PharmD, [address] [facility] Medication Service  Phone: [phone] [address] [facility]  Phone: [phone] option [address] [place] [address] Only    Program: Medical Group  CPA in place:  Yes  Recommendation Provided To: Patient/Caregiver: 2 via Telephone  Intervention Detail: Dose Adjustment: 1, reason: GONZALO, Patient Preference, Therapy De-escalation and Scheduled Appointment  Intervention Accepted By: Patient/Caregiver: 2  Gap Closed?: Yes   Time Spent (min): 30

## 2024-01-09 ENCOUNTER — ANESTHESIA (OUTPATIENT)
Dept: OPERATING ROOM | Age: 39
End: 2024-01-09
Payer: COMMERCIAL

## 2024-01-09 ENCOUNTER — ANESTHESIA EVENT (OUTPATIENT)
Dept: OPERATING ROOM | Age: 39
End: 2024-01-09
Payer: COMMERCIAL

## 2024-01-09 ENCOUNTER — APPOINTMENT (OUTPATIENT)
Dept: ULTRASOUND IMAGING | Age: 39
End: 2024-01-09
Attending: INTERNAL MEDICINE
Payer: COMMERCIAL

## 2024-01-09 ENCOUNTER — HOSPITAL ENCOUNTER (OUTPATIENT)
Age: 39
Setting detail: OUTPATIENT SURGERY
Discharge: HOME | End: 2024-01-09
Attending: INTERNAL MEDICINE | Admitting: INTERNAL MEDICINE
Payer: COMMERCIAL

## 2024-01-09 VITALS
OXYGEN SATURATION: 96 % | TEMPERATURE: 97.3 F | WEIGHT: 290 LBS | HEIGHT: 63 IN | RESPIRATION RATE: 14 BRPM | DIASTOLIC BLOOD PRESSURE: 68 MMHG | HEART RATE: 87 BPM | BODY MASS INDEX: 51.38 KG/M2 | SYSTOLIC BLOOD PRESSURE: 122 MMHG

## 2024-01-09 DIAGNOSIS — K85.90 ACUTE PANCREATITIS, UNSPECIFIED COMPLICATION STATUS, UNSPECIFIED PANCREATITIS TYPE: ICD-10-CM

## 2024-01-09 LAB
BUN BLD-MCNC: 19 MG/DL (ref 8–26)
EGFR, POC: >60 ML/MIN/1.73M2
GLUCOSE BLD-MCNC: 154 MG/DL (ref 65–105)
GLUCOSE BLD-MCNC: 163 MG/DL (ref 74–100)
HCG SERPL QL: NEGATIVE
POC CREATININE: 0.7 MG/DL (ref 0.51–1.19)

## 2024-01-09 PROCEDURE — 6360000002 HC RX W HCPCS: Performed by: NURSE ANESTHETIST, CERTIFIED REGISTERED

## 2024-01-09 PROCEDURE — 3609012400 HC EGD TRANSORAL BIOPSY SINGLE/MULTIPLE: Performed by: INTERNAL MEDICINE

## 2024-01-09 PROCEDURE — 3700000001 HC ADD 15 MINUTES (ANESTHESIA): Performed by: INTERNAL MEDICINE

## 2024-01-09 PROCEDURE — 84703 CHORIONIC GONADOTROPIN ASSAY: CPT

## 2024-01-09 PROCEDURE — 2580000003 HC RX 258: Performed by: NURSE ANESTHETIST, CERTIFIED REGISTERED

## 2024-01-09 PROCEDURE — 82947 ASSAY GLUCOSE BLOOD QUANT: CPT

## 2024-01-09 PROCEDURE — 88305 TISSUE EXAM BY PATHOLOGIST: CPT

## 2024-01-09 PROCEDURE — 6370000000 HC RX 637 (ALT 250 FOR IP): Performed by: NURSE ANESTHETIST, CERTIFIED REGISTERED

## 2024-01-09 PROCEDURE — 7100000001 HC PACU RECOVERY - ADDTL 15 MIN: Performed by: INTERNAL MEDICINE

## 2024-01-09 PROCEDURE — 76975 GI ENDOSCOPIC ULTRASOUND: CPT | Performed by: INTERNAL MEDICINE

## 2024-01-09 PROCEDURE — 3700000000 HC ANESTHESIA ATTENDED CARE: Performed by: INTERNAL MEDICINE

## 2024-01-09 PROCEDURE — 84520 ASSAY OF UREA NITROGEN: CPT

## 2024-01-09 PROCEDURE — 2500000003 HC RX 250 WO HCPCS: Performed by: NURSE ANESTHETIST, CERTIFIED REGISTERED

## 2024-01-09 PROCEDURE — 3609018500 HC EGD US SCOPE W/ADJACENT STRUCTURES: Performed by: INTERNAL MEDICINE

## 2024-01-09 PROCEDURE — 7100000010 HC PHASE II RECOVERY - FIRST 15 MIN: Performed by: INTERNAL MEDICINE

## 2024-01-09 PROCEDURE — 82565 ASSAY OF CREATININE: CPT

## 2024-01-09 PROCEDURE — 43259 EGD US EXAM DUODENUM/JEJUNUM: CPT | Performed by: INTERNAL MEDICINE

## 2024-01-09 PROCEDURE — 6370000000 HC RX 637 (ALT 250 FOR IP): Performed by: ANESTHESIOLOGY

## 2024-01-09 PROCEDURE — 43239 EGD BIOPSY SINGLE/MULTIPLE: CPT | Performed by: INTERNAL MEDICINE

## 2024-01-09 PROCEDURE — 7100000000 HC PACU RECOVERY - FIRST 15 MIN: Performed by: INTERNAL MEDICINE

## 2024-01-09 PROCEDURE — 7100000011 HC PHASE II RECOVERY - ADDTL 15 MIN: Performed by: INTERNAL MEDICINE

## 2024-01-09 PROCEDURE — 2709999900 HC NON-CHARGEABLE SUPPLY: Performed by: INTERNAL MEDICINE

## 2024-01-09 RX ORDER — SODIUM CHLORIDE 0.9 % (FLUSH) 0.9 %
5-40 SYRINGE (ML) INJECTION PRN
Status: DISCONTINUED | OUTPATIENT
Start: 2024-01-09 | End: 2024-01-09 | Stop reason: HOSPADM

## 2024-01-09 RX ORDER — SODIUM CHLORIDE 0.9 % (FLUSH) 0.9 %
5-40 SYRINGE (ML) INJECTION EVERY 12 HOURS SCHEDULED
Status: DISCONTINUED | OUTPATIENT
Start: 2024-01-09 | End: 2024-01-09 | Stop reason: HOSPADM

## 2024-01-09 RX ORDER — FENTANYL CITRATE 50 UG/ML
INJECTION, SOLUTION INTRAMUSCULAR; INTRAVENOUS PRN
Status: DISCONTINUED | OUTPATIENT
Start: 2024-01-09 | End: 2024-01-09 | Stop reason: SDUPTHER

## 2024-01-09 RX ORDER — SODIUM CHLORIDE, SODIUM LACTATE, POTASSIUM CHLORIDE, CALCIUM CHLORIDE 600; 310; 30; 20 MG/100ML; MG/100ML; MG/100ML; MG/100ML
INJECTION, SOLUTION INTRAVENOUS CONTINUOUS PRN
Status: DISCONTINUED | OUTPATIENT
Start: 2024-01-09 | End: 2024-01-09 | Stop reason: SDUPTHER

## 2024-01-09 RX ORDER — DEXAMETHASONE SODIUM PHOSPHATE 10 MG/ML
INJECTION INTRAMUSCULAR; INTRAVENOUS PRN
Status: DISCONTINUED | OUTPATIENT
Start: 2024-01-09 | End: 2024-01-09 | Stop reason: SDUPTHER

## 2024-01-09 RX ORDER — ROCURONIUM BROMIDE 10 MG/ML
INJECTION, SOLUTION INTRAVENOUS PRN
Status: DISCONTINUED | OUTPATIENT
Start: 2024-01-09 | End: 2024-01-09 | Stop reason: SDUPTHER

## 2024-01-09 RX ORDER — SCOLOPAMINE TRANSDERMAL SYSTEM 1 MG/1
1 PATCH, EXTENDED RELEASE TRANSDERMAL ONCE
Status: DISCONTINUED | OUTPATIENT
Start: 2024-01-09 | End: 2024-01-09 | Stop reason: HOSPADM

## 2024-01-09 RX ORDER — ONDANSETRON 2 MG/ML
4 INJECTION INTRAMUSCULAR; INTRAVENOUS
Status: DISCONTINUED | OUTPATIENT
Start: 2024-01-09 | End: 2024-01-09 | Stop reason: HOSPADM

## 2024-01-09 RX ORDER — ALBUTEROL SULFATE 90 UG/1
AEROSOL, METERED RESPIRATORY (INHALATION) PRN
Status: DISCONTINUED | OUTPATIENT
Start: 2024-01-09 | End: 2024-01-09 | Stop reason: SDUPTHER

## 2024-01-09 RX ORDER — LIDOCAINE HYDROCHLORIDE 10 MG/ML
INJECTION, SOLUTION EPIDURAL; INFILTRATION; INTRACAUDAL; PERINEURAL PRN
Status: DISCONTINUED | OUTPATIENT
Start: 2024-01-09 | End: 2024-01-09 | Stop reason: SDUPTHER

## 2024-01-09 RX ORDER — ONDANSETRON 2 MG/ML
INJECTION INTRAMUSCULAR; INTRAVENOUS PRN
Status: DISCONTINUED | OUTPATIENT
Start: 2024-01-09 | End: 2024-01-09 | Stop reason: SDUPTHER

## 2024-01-09 RX ORDER — FENTANYL CITRATE 50 UG/ML
50 INJECTION, SOLUTION INTRAMUSCULAR; INTRAVENOUS EVERY 5 MIN PRN
Status: DISCONTINUED | OUTPATIENT
Start: 2024-01-09 | End: 2024-01-09 | Stop reason: HOSPADM

## 2024-01-09 RX ORDER — SODIUM CHLORIDE 9 MG/ML
INJECTION, SOLUTION INTRAVENOUS PRN
Status: DISCONTINUED | OUTPATIENT
Start: 2024-01-09 | End: 2024-01-09 | Stop reason: HOSPADM

## 2024-01-09 RX ORDER — MIDAZOLAM HYDROCHLORIDE 1 MG/ML
INJECTION INTRAMUSCULAR; INTRAVENOUS PRN
Status: DISCONTINUED | OUTPATIENT
Start: 2024-01-09 | End: 2024-01-09 | Stop reason: SDUPTHER

## 2024-01-09 RX ORDER — FENTANYL CITRATE 50 UG/ML
25 INJECTION, SOLUTION INTRAMUSCULAR; INTRAVENOUS EVERY 5 MIN PRN
Status: DISCONTINUED | OUTPATIENT
Start: 2024-01-09 | End: 2024-01-09 | Stop reason: HOSPADM

## 2024-01-09 RX ORDER — SUCCINYLCHOLINE/SOD CL,ISO/PF 100 MG/5ML
SYRINGE (ML) INTRAVENOUS PRN
Status: DISCONTINUED | OUTPATIENT
Start: 2024-01-09 | End: 2024-01-09 | Stop reason: SDUPTHER

## 2024-01-09 RX ORDER — PROPOFOL 10 MG/ML
INJECTION, EMULSION INTRAVENOUS PRN
Status: DISCONTINUED | OUTPATIENT
Start: 2024-01-09 | End: 2024-01-09 | Stop reason: SDUPTHER

## 2024-01-09 RX ADMIN — SUGAMMADEX 200 MG: 100 INJECTION, SOLUTION INTRAVENOUS at 09:21

## 2024-01-09 RX ADMIN — ROCURONIUM BROMIDE 10 MG: 10 INJECTION, SOLUTION INTRAVENOUS at 08:56

## 2024-01-09 RX ADMIN — MIDAZOLAM 2 MG: 1 INJECTION INTRAMUSCULAR; INTRAVENOUS at 09:02

## 2024-01-09 RX ADMIN — ONDANSETRON 4 MG: 2 INJECTION INTRAMUSCULAR; INTRAVENOUS at 09:21

## 2024-01-09 RX ADMIN — Medication 120 MG: at 08:57

## 2024-01-09 RX ADMIN — FENTANYL CITRATE 100 MCG: 50 INJECTION, SOLUTION INTRAMUSCULAR; INTRAVENOUS at 08:54

## 2024-01-09 RX ADMIN — SODIUM CHLORIDE, POTASSIUM CHLORIDE, SODIUM LACTATE AND CALCIUM CHLORIDE: 600; 310; 30; 20 INJECTION, SOLUTION INTRAVENOUS at 08:52

## 2024-01-09 RX ADMIN — PROPOFOL 200 MCG/KG/MIN: 10 INJECTION, EMULSION INTRAVENOUS at 09:02

## 2024-01-09 RX ADMIN — DEXAMETHASONE SODIUM PHOSPHATE 4 MG: 10 INJECTION INTRAMUSCULAR; INTRAVENOUS at 09:03

## 2024-01-09 RX ADMIN — LIDOCAINE HYDROCHLORIDE 50 MG: 10 INJECTION, SOLUTION EPIDURAL; INFILTRATION; INTRACAUDAL; PERINEURAL at 08:56

## 2024-01-09 RX ADMIN — ALBUTEROL SULFATE 12 PUFF: 90 AEROSOL, METERED RESPIRATORY (INHALATION) at 09:28

## 2024-01-09 RX ADMIN — PROPOFOL 150 MG: 10 INJECTION, EMULSION INTRAVENOUS at 08:56

## 2024-01-09 RX ADMIN — PROPOFOL 50 MG: 10 INJECTION, EMULSION INTRAVENOUS at 08:59

## 2024-01-09 ASSESSMENT — PAIN DESCRIPTION - DESCRIPTORS
DESCRIPTORS: SORE

## 2024-01-09 ASSESSMENT — PAIN SCALES - GENERAL
PAINLEVEL_OUTOF10: 3

## 2024-01-09 ASSESSMENT — PAIN - FUNCTIONAL ASSESSMENT
PAIN_FUNCTIONAL_ASSESSMENT: NONE - DENIES PAIN
PAIN_FUNCTIONAL_ASSESSMENT: 0-10

## 2024-01-09 ASSESSMENT — PAIN DESCRIPTION - LOCATION
LOCATION: THROAT

## 2024-01-09 NOTE — H&P
History and Physical    Pt Name: Isha Edward  MRN: [id number]  YOB: 1985  Date of evaluation: 1/9/2024  Primary Care Physician: Alessandra Bang MD    SUBJECTIVE:   History of Chief Complaint:    Isha Edward is a 38 y.o. female who is scheduled today for ENDOSCOPIC ULTRASOUND WITH PATHOLOGY. She reports she was admitted around Thanksgiving time for abdominal pain, unknown etiology at that time. She reports she followed with gastroenterologist after following a prescribed liquid diet and was determined she was having \"classic\" pancreatitis symptoms. She reports she has baseline abdominal discomfort of 2/10, but that when she was evaluated around Thanksgiving time, her pain was an 8/10- she reports having a high pain tolerance. She reports history of gestational diabetes, and a 12 year history of DM2 and that she had been on Mounjaro for, but that it was discontinued in November after her hospital admission.   Allergies  is allergic to isoflurane, penicillins, sevoflurane, and seasonal.  Medications  Prior to Admission medications    Medication Sig Start Date End Date Taking? Authorizing Provider   lisinopril (PRINIVIL;ZESTRIL) 5 MG tablet TAKE 1 TABLET BY MOUTH DAILY 12/18/23   Alessandra Bang MD   atorvastatin (LIPITOR) 20 MG tablet TAKE 1 TABLET BY MOUTH DAILY 12/18/23   Alessandra Bang MD   metFORMIN (GLUCOPHAGE-XR) 500 MG extended release tablet Take 1 tablet by mouth daily (with breakfast)  Patient taking differently: Take 2 tablets by mouth daily (with breakfast) 12/7/23   Alessandra Bang MD   buPROPion (WELLBUTRIN XL) 300 MG extended release tablet TAKE 1 TABLET BY MOUTH DAILY 11/21/23   Alessandra Bang MD   JARDIANCE 10 MG tablet TAKE ONE TABLET BY MOUTH DAILY 11/14/23   Alessandra Bang MD   Continuous Blood Gluc Sensor (DEXCOM G6 SENSOR) MISC USE AS DIRECTED TO MEASURE BLOOD SUGAR - CHANGE EVERY 10 DAYS 10/16/23   Alessandra Bang MD   fluticasone (FLONASE) 50 MCG/ACT nasal spray 2

## 2024-01-09 NOTE — DISCHARGE INSTRUCTIONS
[facility]    POST-ENDOSCOPY INSTRUCTIONS    1. ACTIVITY   No driving, operating machinery, or making important decisions for 24 hours.    Resume normal activity after 24 hours.  You may return to work after 24 hours.    2. DIET        Resume your usual diet unless specified below.   ***    3. MEDICATIONS    Resume your usual medications.     Do not consume alcohol, tranquilizers, or sleeping medications for 24 hour unless advised by your physician.                 4. PHYSICIAN FOLLOW-UP / INSTRUCTIONS    Please call the office/clinic in 10 days for biopsy results:      [  ] GI office:  [phone]          Follow up with your family physician as planned.    6. NORMAL CHANGES YOU MAY EXPERIENCE AFTER ENDOSCOPY:         EGD/EUS          Sore throat after EGD/EUS       A bloated feeling and belching from       air in stomach               You may feel fatigued for the next 24-48   hours due to the prep and sedation    7. CALL YOUR PHYSICIAN IF YOU EXPERIENCE ANY OF THE FOLLOWING      A.  Passing blood rectally or vomiting blood (color may be red or black)      B.  Severe abdominal pain or tenderness (that is not relieved by passing air)      C.  Fever, chills, or excessive sweating      D.  Persistent nausea or vomiting      E.  Redness or swelling at the IV site    If you have additional questions, PLEASE call your doctor or the [facility] GI Unit ([phone])          Scopolamine Patch Home Instructions     1.  Remove Scopolamine patch behind ear in 3 days  2.  Wash hands before and after removing patch  3.  This medication may cause headaches, dry mouth and/or dilated pupils    No alcoholic beverages, no driving or operating machinery, no making important decisions for 24 hours.   You may have a normal diet but should eat lightly day of surgery.  Drink plenty of fluids.  Urinate within 8 hours after surgery, if unable to urinate call your doctor

## 2024-01-09 NOTE — OP NOTE
Operative Note      Patient: Isha Edward  YOB: 1985  MRN: [id number]    Date of Procedure: 1/9/2024    Pre-Op Diagnosis Codes:     * Acute pancreatitis, unspecified complication status, unspecified pancreatitis type [K85.90]    Post-Op Diagnosis: Irregular Z-line, mild gastritis, mild chronic pancreatitis limited to the head/uncinate process of the pancreas, cholelithiasis       Procedure(s):  ENDOSCOPIC ULTRASOUND WITH PATHOLOGY  EGD BIOPSY    Surgeon(s):  Kenji Truong MD    Assistant:   First Assistant: Joleen Tomlin RN    Anesthesia: Monitor Anesthesia Care    Estimated Blood Loss (mL): Minimal    Complications: None    Specimens:   ID Type Source Tests Collected by Time Destination   A : duodenal bx Tissue Duodenum SURGICAL PATHOLOGY Kenji Truong MD 1/9/2024 0906    B : gastric bx Tissue Gastric SURGICAL PATHOLOGY Kenji Truong MD 1/9/2024 0907    C : irregular Zline bx Tissue Esophagus SURGICAL PATHOLOGY Kenji Truong MD 1/9/2024 0908        Implants:  * No implants in log *      Drains: * No LDAs found *      Description of Procedure:  Informed consent was obtained from the patient after explanation of the procedure including indications, description of the procedure,  benefits and possible risks and complications of the procedure, and alternatives. Questions were answered.  The patient's history was reviewed and a directed physical examination was performed prior to the procedure.    Patient was monitored throughout the procedure with pulse oximetry and periodic assessment of vital signs. Patient was sedated as noted above. With the patient in the left lateral decubitus position, the Olympus videoendoscope followed by endoechoendoscope was placed in the patient's mouth and under direct visualization passed into the esophagus. The scope was passed to the 2nd portion of the duodenum.  The patient tolerated the procedure well and was taken to the recovery

## 2024-01-09 NOTE — ANESTHESIA PRE PROCEDURE
Department of Anesthesiology  Preprocedure Note       Name:  Isha Edward   Age:  38 y.o.  :  1985                                          MRN:  [id number]         Date:  2024      Surgeon: Surgeon(s):  Kenji Truong MD    Procedure: Procedure(s):  Endoscopic US with pathology    Medications prior to admission:   Prior to Admission medications    Medication Sig Start Date End Date Taking? Authorizing Provider   lisinopril (PRINIVIL;ZESTRIL) 5 MG tablet TAKE 1 TABLET BY MOUTH DAILY 23   Alessandra Bang MD   atorvastatin (LIPITOR) 20 MG tablet TAKE 1 TABLET BY MOUTH DAILY 23   Alessandra Bang MD   metFORMIN (GLUCOPHAGE-XR) 500 MG extended release tablet Take 1 tablet by mouth daily (with breakfast)  Patient taking differently: Take 2 tablets by mouth daily (with breakfast) 23   Alessandra Bang MD   buPROPion (WELLBUTRIN XL) 300 MG extended release tablet TAKE 1 TABLET BY MOUTH DAILY 23   Alessandra Bang MD   JARDIANCE 10 MG tablet TAKE ONE TABLET BY MOUTH DAILY 23   Alessandra Bang MD   Continuous Blood Gluc Sensor (DEXCOM G6 SENSOR) MISC USE AS DIRECTED TO MEASURE BLOOD SUGAR - CHANGE EVERY 10 DAYS 10/16/23   Alessandra Bang MD   fluticasone (FLONASE) 50 MCG/ACT nasal spray 2 sprays by Each Nostril route daily  Patient taking differently: 2 sprays by Each Nostril route daily As needed 23   Bryce Hitchcock MD   naproxen (NAPROSYN) 500 MG tablet TAKE ONE TABLET BY MOUTH TWICE A DAY WITH FOOD  Patient taking differently: Take 1 tablet by mouth daily TAKE ONE TABLET BY MOUTH TWICE A DAY WITH FOOD 23   Alessandra Bang MD   Glucagon (GVOKE HYPOPEN 2-PACK) 1 MG/0.2ML SOAJ Inject 1 mg into the skin as needed (hypoglycemia) 23   Alessandra Bang MD   TRESIBA FLEXTOUCH 100 UNIT/ML SOPN Inject 18 Units into the skin daily (with breakfast)  Patient taking differently: Inject 8 Units into the skin daily (with breakfast) 23   Alessandra Bang MD

## 2024-01-09 NOTE — ANESTHESIA POSTPROCEDURE EVALUATION
Department of Anesthesiology  Postprocedure Note    Patient: Isha Edward  MRN: [id number]  YOB: 1985  Date of evaluation: 1/9/2024    Procedure Summary       Date: 01/09/24 Room / Location: [address]    Anesthesia Start: 0852 Anesthesia Stop: 0939    Procedures:       ENDOSCOPIC ULTRASOUND WITH PATHOLOGY      EGD BIOPSY Diagnosis:       Acute pancreatitis, unspecified complication status, unspecified pancreatitis type      (Acute pancreatitis, unspecified complication status, unspecified pancreatitis type [K85.90])    Surgeons: Kenji Truong MD Responsible Provider: Jose Antonio Armijo MD    Anesthesia Type: general ASA Status: 3            Anesthesia Type: No value filed.    Kike Phase I: Kike Score: 10    Kike Phase II: Kike Score: 10  POST-OP ANESTHESIA NOTE       /68   Pulse 87   Temp 97.3 °F (36.3 °C)   Resp 14   Ht 1.6 m (5' 3\")   Wt 131.5 kg (290 lb)   LMP 12/21/2023 (Approximate)   SpO2 96%   BMI 51.37 kg/m²    Pain Assessment: 0-10  Pain Level: 3           Anesthesia Post Evaluation    Patient location during evaluation: PACU  Patient participation: complete - patient participated  Level of consciousness: awake  Pain score: 3  Airway patency: patent  Nausea & Vomiting: no vomiting and no nausea  Cardiovascular status: hemodynamically stable  Respiratory status: acceptable  Hydration status: stable        No notable events documented.

## 2024-01-10 LAB — SURGICAL PATHOLOGY REPORT: NORMAL

## 2024-01-16 ENCOUNTER — PHARMACY VISIT (OUTPATIENT)
Dept: FAMILY MEDICINE CLINIC | Age: 39
End: 2024-01-16

## 2024-01-16 DIAGNOSIS — Z79.4 TYPE 2 DIABETES MELLITUS WITHOUT COMPLICATION, WITH LONG-TERM CURRENT USE OF INSULIN (HCC): Primary | ICD-10-CM

## 2024-01-16 DIAGNOSIS — E11.9 TYPE 2 DIABETES MELLITUS WITHOUT COMPLICATION, WITH LONG-TERM CURRENT USE OF INSULIN (HCC): Primary | ICD-10-CM

## 2024-01-16 PROCEDURE — 99999 PR OFFICE/OUTPT VISIT,PROCEDURE ONLY: CPT | Performed by: PHARMACIST

## 2024-01-16 NOTE — PROGRESS NOTES
Clinical Pharmacy Note    Isha Edward is a 38 y.o. female, referred to Clinical Pharmacy for medication management by Alessandra Bang MD. Isha Edward was seen today for diabetes management.     Allergies   Allergen Reactions    Isoflurane Shortness Of Breath    Penicillins Anaphylaxis and Other (See Comments)    Sevoflurane Shortness Of Breath     General anest. \"Gas\"    Seasonal Itching        Past Medical History:   Diagnosis Date    Anesthesia complication     O2 stat dropped, bp dropped, trouble breathing, dizziness, N/V    Anxiety and depression     Depression     Essential hypertension     Patient denies but on PCP note from 2023- states Rx was for renal protection, and has never had HTN    Migraines     Mixed hyperlipidemia     Patient denies but on PCP note from 2023    Obesity     Pancreatitis, acute 2023    Personal history of other medical treatment     Numbness and tingling bilateral hands    PONV (postoperative nausea and vomiting)     Seasonal allergies     Sleep apnea     uses cpap nightly    Type 2 diabetes mellitus without complication (HCC)     Under care of team 2024    PCP: Deng Deand, last visit 2023    Under care of team 2024    Pulmonary: Bryce Hitchcock MD, [facility], last visit 2023    Uses self-applied continuous glucose monitoring device     Vitamin D deficiency     Wears glasses     Wears glasses       Social History     Tobacco Use    Smoking status: Former     Current packs/day: 0.00     Average packs/day: 0.5 packs/day for 10.0 years (5.0 ttl pk-yrs)     Types: Cigarettes     Start date:      Quit date: 2015     Years since quittin.0    Smokeless tobacco: Never    Tobacco comments:     quit smoking 4 yrs ago   Substance Use Topics    Alcohol use: Yes     Comment: social     Family History   Problem Relation Age of Onset    High Blood Pressure Mother     Diabetes type 2  Mother     Heart Attack Maternal Grandfather

## 2024-01-23 DIAGNOSIS — E11.9 CONTROLLED TYPE 2 DIABETES MELLITUS WITHOUT COMPLICATION, WITHOUT LONG-TERM CURRENT USE OF INSULIN (HCC): ICD-10-CM

## 2024-01-24 RX ORDER — PROCHLORPERAZINE 25 MG/1
SUPPOSITORY RECTAL
Qty: 3 EACH | Refills: 2 | Status: SHIPPED | OUTPATIENT
Start: 2024-01-24

## 2024-01-27 NOTE — PROGRESS NOTES
Her behavior is normal.   Vitals reviewed. Assessment/PLan  1. Controlled type 2 diabetes mellitus without complication, with long-term current use of insulin (HCC)  Improved from last appt. Weight is up though. Encouraged to work on low carb diet and increase activity. Increase tresiba to 40 units  - POCT glycosylated hemoglobin (Hb A1C)-8.0  - Insulin Degludec (TRESIBA FLEXTOUCH) 100 UNIT/ML SOPN; Inject 40 Units into the skin daily  Dispense: 5 pen; Refill: 3  - Comprehensive Metabolic Panel; Future  - Lipid Panel; Future    2. Mixed hyperlipidemia  May need to start statin. - Lipid Panel; Future    3. Morbid obesity due to excess calories (HCC)  Chronic. Not improving. Discussed diet     4. Recurrent major depressive disorder, in full remission ([facility] [address]  Cont wellbutrin      Felipe Garcia received counseling on the following healthy behaviors: nutrition, exercise and medication adherence  Reviewed prior labs and health maintenance. Continue current medications, diet and exercise. Discussed use, benefit, and side effects of prescribed medications. Barriers to medication compliance addressed. Patient given educational materials - see patient instructions. All patient questions answered. Patient voiced understanding. Return in about 4 months (around 4/11/2019) for weight check, diabetes.         Electronically signed by Reyna Recinos MD on 12/11/18 at 9:22 AM
Awake

## 2024-02-13 ENCOUNTER — PHARMACY VISIT (OUTPATIENT)
Dept: FAMILY MEDICINE CLINIC | Age: 39
End: 2024-02-13

## 2024-02-13 VITALS
BODY MASS INDEX: 51.14 KG/M2 | HEART RATE: 80 BPM | WEIGHT: 288.6 LBS | DIASTOLIC BLOOD PRESSURE: 79 MMHG | SYSTOLIC BLOOD PRESSURE: 117 MMHG

## 2024-02-13 DIAGNOSIS — E11.9 CONTROLLED TYPE 2 DIABETES MELLITUS WITHOUT COMPLICATION, WITHOUT LONG-TERM CURRENT USE OF INSULIN (HCC): Primary | ICD-10-CM

## 2024-02-13 PROCEDURE — 99999 PR OFFICE/OUTPT VISIT,PROCEDURE ONLY: CPT | Performed by: PHARMACIST

## 2024-02-13 RX ORDER — TRAMADOL HYDROCHLORIDE 50 MG/1
50 TABLET ORAL EVERY 8 HOURS PRN
COMMUNITY
Start: 2024-02-01

## 2024-02-13 NOTE — PROGRESS NOTES
Clinical Pharmacy Note    Isha Edward is a 38 y.o. female, referred to Clinical Pharmacy for medication management by Alessandra Bang MD. Isha Edward was seen today for diabetes management.     Has been steady. Very rare to be over 200; running around 130s all day    Allergies   Allergen Reactions    Isoflurane Shortness Of Breath    Penicillins Anaphylaxis and Other (See Comments)    Sevoflurane Shortness Of Breath     General anest. \"Gas\"    Seasonal Itching        Past Medical History:   Diagnosis Date    Anesthesia complication     O2 stat dropped, bp dropped, trouble breathing, dizziness, N/V    Anxiety and depression     Depression     Essential hypertension     Patient denies but on PCP note from 2023- states Rx was for renal protection, and has never had HTN    Migraines     Mixed hyperlipidemia     Patient denies but on PCP note from 2023    Obesity     Pancreatitis, acute 2023    Personal history of other medical treatment     Numbness and tingling bilateral hands    PONV (postoperative nausea and vomiting)     Seasonal allergies     Sleep apnea     uses cpap nightly    Type 2 diabetes mellitus without complication (HCC)     Under care of team 2024    PCP: Dr. Alessandra Bang Zhong, last visit 2023    Under care of team 2024    Pulmonary: Bryce Hitchcock MD, [facility], last visit 2023    Uses self-applied continuous glucose monitoring device     Vitamin D deficiency     Wears glasses     Wears glasses       Social History     Tobacco Use    Smoking status: Former     Current packs/day: 0.00     Average packs/day: 0.5 packs/day for 10.0 years (5.0 ttl pk-yrs)     Types: Cigarettes     Start date:      Quit date: 2015     Years since quittin.1    Smokeless tobacco: Never    Tobacco comments:     quit smoking 4 yrs ago   Substance Use Topics    Alcohol use: Yes     Comment: social     Family History   Problem Relation Age of Onset    High Blood Pressure

## 2024-03-01 DIAGNOSIS — E11.9 CONTROLLED TYPE 2 DIABETES MELLITUS WITHOUT COMPLICATION, WITHOUT LONG-TERM CURRENT USE OF INSULIN (HCC): ICD-10-CM

## 2024-03-01 RX ORDER — METFORMIN HYDROCHLORIDE 500 MG/1
500 TABLET, EXTENDED RELEASE ORAL
Qty: 90 TABLET | Refills: 3 | Status: SHIPPED | OUTPATIENT
Start: 2024-03-01

## 2024-03-01 NOTE — TELEPHONE ENCOUNTER
Isha Edward is calling to request a refill on the following medication(s):    Last Visit Date (If Applicable):  12/7/2023    Next Visit Date:    6/10/2024    Medication Request:  Requested Prescriptions     Pending Prescriptions Disp Refills    metFORMIN (GLUCOPHAGE-XR) 500 MG extended release tablet 90 tablet 3     Sig: Take 1 tablet by mouth daily (with breakfast)

## 2024-03-07 ENCOUNTER — OFFICE VISIT (OUTPATIENT)
Dept: FAMILY MEDICINE CLINIC | Age: 39
End: 2024-03-07
Payer: COMMERCIAL

## 2024-03-07 ENCOUNTER — PHARMACY VISIT (OUTPATIENT)
Dept: FAMILY MEDICINE CLINIC | Age: 39
End: 2024-03-07

## 2024-03-07 ENCOUNTER — HOSPITAL ENCOUNTER (OUTPATIENT)
Age: 39
Setting detail: SPECIMEN
Discharge: HOME | End: 2024-03-07

## 2024-03-07 ENCOUNTER — HOSPITAL ENCOUNTER (OUTPATIENT)
Age: 39
Discharge: HOME | End: 2024-03-07
Payer: COMMERCIAL

## 2024-03-07 VITALS
WEIGHT: 293 LBS | HEART RATE: 80 BPM | DIASTOLIC BLOOD PRESSURE: 84 MMHG | BODY MASS INDEX: 52.16 KG/M2 | SYSTOLIC BLOOD PRESSURE: 123 MMHG

## 2024-03-07 VITALS
BODY MASS INDEX: 53.92 KG/M2 | OXYGEN SATURATION: 97 % | HEIGHT: 62 IN | HEART RATE: 80 BPM | SYSTOLIC BLOOD PRESSURE: 123 MMHG | WEIGHT: 293 LBS | DIASTOLIC BLOOD PRESSURE: 84 MMHG

## 2024-03-07 DIAGNOSIS — E11.9 TYPE 2 DIABETES MELLITUS WITHOUT COMPLICATION, WITH LONG-TERM CURRENT USE OF INSULIN (HCC): Primary | ICD-10-CM

## 2024-03-07 DIAGNOSIS — Z79.4 TYPE 2 DIABETES MELLITUS WITHOUT COMPLICATION, WITH LONG-TERM CURRENT USE OF INSULIN (HCC): Primary | ICD-10-CM

## 2024-03-07 DIAGNOSIS — Z01.818 PRE-OP TESTING: ICD-10-CM

## 2024-03-07 DIAGNOSIS — Z01.818 PRE-OPERATIVE CLEARANCE: Primary | ICD-10-CM

## 2024-03-07 DIAGNOSIS — E11.9 CONTROLLED TYPE 2 DIABETES MELLITUS WITHOUT COMPLICATION, WITHOUT LONG-TERM CURRENT USE OF INSULIN (HCC): ICD-10-CM

## 2024-03-07 LAB
ANION GAP SERPL CALCULATED.3IONS-SCNC: 13 MMOL/L (ref 9–16)
BUN SERPL-MCNC: 17 MG/DL (ref 6–20)
CALCIUM SERPL-MCNC: 9.5 MG/DL (ref 8.6–10.4)
CHLORIDE SERPL-SCNC: 105 MMOL/L (ref 98–107)
CO2 SERPL-SCNC: 23 MMOL/L (ref 20–31)
CREAT SERPL-MCNC: 0.9 MG/DL (ref 0.5–0.9)
EKG ATRIAL RATE: 89 BPM
EKG P-R INTERVAL: 152 MS
EKG Q-T INTERVAL: 354 MS
EKG QRS DURATION: 76 MS
EKG QTC CALCULATION (BAZETT): 430 MS
EKG R AXIS: 71 DEGREES
EKG T AXIS: 49 DEGREES
EKG VENTRICULAR RATE: 89 BPM
GFR SERPL CREATININE-BSD FRML MDRD: >60 ML/MIN/1.73M2
GLUCOSE SERPL-MCNC: 158 MG/DL (ref 74–99)
HBA1C MFR BLD: 7.1 %
HGB BLD-MCNC: 14 G/DL (ref 11.9–15.1)
MCH RBC QN AUTO: 28 PG (ref 25.2–33.5)
MCHC RBC AUTO-ENTMCNC: 31.1 G/DL (ref 28.4–34.8)
MCV RBC AUTO: 90 FL (ref 82.6–102.9)
NRBC BLD-RTO: 0 PER 100 WBC
PLATELET # BLD AUTO: 225 K/UL (ref 138–453)
PMV BLD AUTO: 12.6 FL (ref 8.1–13.5)
POTASSIUM SERPL-SCNC: 4.4 MMOL/L (ref 3.7–5.3)
SODIUM SERPL-SCNC: 141 MMOL/L (ref 136–145)
WBC OTHER # BLD: 6.8 K/UL (ref 3.5–11.3)

## 2024-03-07 PROCEDURE — 99213 OFFICE O/P EST LOW 20 MIN: CPT | Performed by: FAMILY MEDICINE

## 2024-03-07 PROCEDURE — 83036 HEMOGLOBIN GLYCOSYLATED A1C: CPT | Performed by: PHARMACIST

## 2024-03-07 PROCEDURE — 93005 ELECTROCARDIOGRAM TRACING: CPT

## 2024-03-07 RX ORDER — METFORMIN HYDROCHLORIDE 500 MG/1
1000 TABLET, EXTENDED RELEASE ORAL
Qty: 180 TABLET | Refills: 3 | Status: SHIPPED | OUTPATIENT
Start: 2024-03-07

## 2024-03-07 SDOH — ECONOMIC STABILITY: INCOME INSECURITY: HOW HARD IS IT FOR YOU TO PAY FOR THE VERY BASICS LIKE FOOD, HOUSING, MEDICAL CARE, AND HEATING?: NOT HARD AT ALL

## 2024-03-07 SDOH — ECONOMIC STABILITY: FOOD INSECURITY: WITHIN THE PAST 12 MONTHS, THE FOOD YOU BOUGHT JUST DIDN'T LAST AND YOU DIDN'T HAVE MONEY TO GET MORE.: NEVER TRUE

## 2024-03-07 SDOH — ECONOMIC STABILITY: FOOD INSECURITY: WITHIN THE PAST 12 MONTHS, YOU WORRIED THAT YOUR FOOD WOULD RUN OUT BEFORE YOU GOT MONEY TO BUY MORE.: NEVER TRUE

## 2024-03-07 ASSESSMENT — PATIENT HEALTH QUESTIONNAIRE - PHQ9
3. TROUBLE FALLING OR STAYING ASLEEP: 2
7. TROUBLE CONCENTRATING ON THINGS, SUCH AS READING THE NEWSPAPER OR WATCHING TELEVISION: 0
4. FEELING TIRED OR HAVING LITTLE ENERGY: 0
5. POOR APPETITE OR OVEREATING: 0
1. LITTLE INTEREST OR PLEASURE IN DOING THINGS: 0
2. FEELING DOWN, DEPRESSED OR HOPELESS: 1
SUM OF ALL RESPONSES TO PHQ9 QUESTIONS 1 & 2: 1
SUM OF ALL RESPONSES TO PHQ QUESTIONS 1-9: 7
9. THOUGHTS THAT YOU WOULD BE BETTER OFF DEAD, OR OF HURTING YOURSELF: 0
10. IF YOU CHECKED OFF ANY PROBLEMS, HOW DIFFICULT HAVE THESE PROBLEMS MADE IT FOR YOU TO DO YOUR WORK, TAKE CARE OF THINGS AT HOME, OR GET ALONG WITH OTHER PEOPLE: 1
SUM OF ALL RESPONSES TO PHQ QUESTIONS 1-9: 7
6. FEELING BAD ABOUT YOURSELF - OR THAT YOU ARE A FAILURE OR HAVE LET YOURSELF OR YOUR FAMILY DOWN: 1
SUM OF ALL RESPONSES TO PHQ QUESTIONS 1-9: 7
8. MOVING OR SPEAKING SO SLOWLY THAT OTHER PEOPLE COULD HAVE NOTICED. OR THE OPPOSITE, BEING SO FIGETY OR RESTLESS THAT YOU HAVE BEEN MOVING AROUND A LOT MORE THAN USUAL: 3
SUM OF ALL RESPONSES TO PHQ QUESTIONS 1-9: 7

## 2024-03-07 NOTE — PROGRESS NOTES
Clinical Pharmacy Note    Isha Edward is a 38 y.o. female, referred to Clinical Pharmacy for medication management by Alessandra Bang MD. Isha Edward was seen today for diabetes management.     Out of metformin for a 10 days due to no rx  Metformin 2 daily  Sugars running higher at 140s   Increase Tresiba to 22 units daily    Allergies   Allergen Reactions    Isoflurane Shortness Of Breath    Penicillins Anaphylaxis and Other (See Comments)    Sevoflurane Shortness Of Breath     General anest. \"Gas\"    Seasonal Itching        Past Medical History:   Diagnosis Date    Anesthesia complication     O2 stat dropped, bp dropped, trouble breathing, dizziness, N/V    Anxiety and depression     Depression     Essential hypertension     Patient denies but on PCP note from 2023- states Rx was for renal protection, and has never had HTN    Migraines     Mixed hyperlipidemia     Patient denies but on PCP note from 2023    Obesity     Pancreatitis, acute 2023    Personal history of other medical treatment     Numbness and tingling bilateral hands    PONV (postoperative nausea and vomiting)     Seasonal allergies     Sleep apnea     uses cpap nightly    Type 2 diabetes mellitus without complication (HCC)     Under care of team 2024    PCP: Trevin De, last visit 2023    Under care of team 2024    Pulmonary: Bryce Hitchcock MD, [facility], last visit 2023    Uses self-applied continuous glucose monitoring device     Vitamin D deficiency     Wears glasses     Wears glasses       Social History     Tobacco Use    Smoking status: Former     Current packs/day: 0.00     Average packs/day: 0.5 packs/day for 10.0 years (5.0 ttl pk-yrs)     Types: Cigarettes     Start date:      Quit date: 2015     Years since quittin.1    Smokeless tobacco: Never    Tobacco comments:     quit smoking 4 yrs ago   Substance Use Topics    Alcohol use: Yes     Comment: social     Family History

## 2024-03-21 ENCOUNTER — ANESTHESIA EVENT (OUTPATIENT)
Dept: OPERATING ROOM | Age: 39
End: 2024-03-21
Payer: COMMERCIAL

## 2024-03-21 ENCOUNTER — ANESTHESIA (OUTPATIENT)
Dept: OPERATING ROOM | Age: 39
End: 2024-03-21
Payer: COMMERCIAL

## 2024-03-21 ENCOUNTER — HOSPITAL ENCOUNTER (OUTPATIENT)
Age: 39
Setting detail: OUTPATIENT SURGERY
Discharge: HOME | End: 2024-03-21
Attending: SURGERY | Admitting: SURGERY
Payer: COMMERCIAL

## 2024-03-21 VITALS
OXYGEN SATURATION: 95 % | TEMPERATURE: 97.3 F | DIASTOLIC BLOOD PRESSURE: 77 MMHG | RESPIRATION RATE: 14 BRPM | HEART RATE: 87 BPM | HEIGHT: 63 IN | WEIGHT: 290.1 LBS | SYSTOLIC BLOOD PRESSURE: 113 MMHG | BODY MASS INDEX: 51.4 KG/M2

## 2024-03-21 DIAGNOSIS — K80.10 CALCULUS OF GALLBLADDER WITH CHOLECYSTITIS WITHOUT BILIARY OBSTRUCTION, UNSPECIFIED CHOLECYSTITIS ACUITY: ICD-10-CM

## 2024-03-21 LAB
GLUCOSE BLD-MCNC: 127 MG/DL (ref 65–105)
GLUCOSE BLD-MCNC: 151 MG/DL (ref 65–105)
HCG, PREGNANCY URINE (POC): NEGATIVE

## 2024-03-21 PROCEDURE — 6370000000 HC RX 637 (ALT 250 FOR IP): Performed by: ANESTHESIOLOGY

## 2024-03-21 PROCEDURE — 6360000002 HC RX W HCPCS: Performed by: SURGERY

## 2024-03-21 PROCEDURE — 3700000001 HC ADD 15 MINUTES (ANESTHESIA): Performed by: SURGERY

## 2024-03-21 PROCEDURE — 2500000003 HC RX 250 WO HCPCS: Performed by: ANESTHESIOLOGY

## 2024-03-21 PROCEDURE — C1889 IMPLANT/INSERT DEVICE, NOC: HCPCS | Performed by: SURGERY

## 2024-03-21 PROCEDURE — 2720000010 HC SURG SUPPLY STERILE: Performed by: SURGERY

## 2024-03-21 PROCEDURE — 7100000000 HC PACU RECOVERY - FIRST 15 MIN: Performed by: SURGERY

## 2024-03-21 PROCEDURE — 6360000002 HC RX W HCPCS: Performed by: NURSE ANESTHETIST, CERTIFIED REGISTERED

## 2024-03-21 PROCEDURE — A4216 STERILE WATER/SALINE, 10 ML: HCPCS | Performed by: ANESTHESIOLOGY

## 2024-03-21 PROCEDURE — 2500000003 HC RX 250 WO HCPCS: Performed by: SURGERY

## 2024-03-21 PROCEDURE — 6360000002 HC RX W HCPCS: Performed by: ANESTHESIOLOGY

## 2024-03-21 PROCEDURE — 2500000003 HC RX 250 WO HCPCS: Performed by: NURSE ANESTHETIST, CERTIFIED REGISTERED

## 2024-03-21 PROCEDURE — 2580000003 HC RX 258: Performed by: ANESTHESIOLOGY

## 2024-03-21 PROCEDURE — 7100000011 HC PHASE II RECOVERY - ADDTL 15 MIN: Performed by: SURGERY

## 2024-03-21 PROCEDURE — 82947 ASSAY GLUCOSE BLOOD QUANT: CPT

## 2024-03-21 PROCEDURE — 2709999900 HC NON-CHARGEABLE SUPPLY: Performed by: SURGERY

## 2024-03-21 PROCEDURE — 3700000000 HC ANESTHESIA ATTENDED CARE: Performed by: SURGERY

## 2024-03-21 PROCEDURE — 88304 TISSUE EXAM BY PATHOLOGIST: CPT

## 2024-03-21 PROCEDURE — 3600000019 HC SURGERY ROBOT ADDTL 15MIN: Performed by: SURGERY

## 2024-03-21 PROCEDURE — 81025 URINE PREGNANCY TEST: CPT

## 2024-03-21 PROCEDURE — S2900 ROBOTIC SURGICAL SYSTEM: HCPCS | Performed by: SURGERY

## 2024-03-21 PROCEDURE — 7100000010 HC PHASE II RECOVERY - FIRST 15 MIN: Performed by: SURGERY

## 2024-03-21 PROCEDURE — 3600000009 HC SURGERY ROBOT BASE: Performed by: SURGERY

## 2024-03-21 PROCEDURE — 7100000001 HC PACU RECOVERY - ADDTL 15 MIN: Performed by: SURGERY

## 2024-03-21 DEVICE — CLIP INT L POLYMER LOK LIG HEM O LOK (6EA/PK): Type: IMPLANTABLE DEVICE | Site: BILE DUCT | Status: FUNCTIONAL

## 2024-03-21 RX ORDER — BUPIVACAINE HYDROCHLORIDE 2.5 MG/ML
INJECTION, SOLUTION EPIDURAL; INFILTRATION; INTRACAUDAL PRN
Status: DISCONTINUED | OUTPATIENT
Start: 2024-03-21 | End: 2024-03-21 | Stop reason: ALTCHOICE

## 2024-03-21 RX ORDER — SODIUM CHLORIDE 0.9 % (FLUSH) 0.9 %
5-40 SYRINGE (ML) INJECTION PRN
Status: DISCONTINUED | OUTPATIENT
Start: 2024-03-21 | End: 2024-03-21 | Stop reason: HOSPADM

## 2024-03-21 RX ORDER — NALOXONE HYDROCHLORIDE 0.4 MG/ML
INJECTION, SOLUTION INTRAMUSCULAR; INTRAVENOUS; SUBCUTANEOUS PRN
Status: DISCONTINUED | OUTPATIENT
Start: 2024-03-21 | End: 2024-03-21 | Stop reason: HOSPADM

## 2024-03-21 RX ORDER — PROPOFOL 10 MG/ML
INJECTION, EMULSION INTRAVENOUS PRN
Status: DISCONTINUED | OUTPATIENT
Start: 2024-03-21 | End: 2024-03-21 | Stop reason: SDUPTHER

## 2024-03-21 RX ORDER — OXYCODONE HYDROCHLORIDE AND ACETAMINOPHEN 5; 325 MG/1; MG/1
1 TABLET ORAL EVERY 6 HOURS PRN
Qty: 28 TABLET | Refills: 0 | Status: SHIPPED | OUTPATIENT
Start: 2024-03-21 | End: 2024-03-28

## 2024-03-21 RX ORDER — HALOPERIDOL 5 MG/ML
1 INJECTION INTRAMUSCULAR ONCE
Status: COMPLETED | OUTPATIENT
Start: 2024-03-21 | End: 2024-03-21

## 2024-03-21 RX ORDER — MIDAZOLAM HYDROCHLORIDE 1 MG/ML
INJECTION INTRAMUSCULAR; INTRAVENOUS PRN
Status: DISCONTINUED | OUTPATIENT
Start: 2024-03-21 | End: 2024-03-21 | Stop reason: SDUPTHER

## 2024-03-21 RX ORDER — OXYCODONE HYDROCHLORIDE AND ACETAMINOPHEN 5; 325 MG/1; MG/1
1 TABLET ORAL
Status: DISCONTINUED | OUTPATIENT
Start: 2024-03-21 | End: 2024-03-21 | Stop reason: HOSPADM

## 2024-03-21 RX ORDER — PROMETHAZINE HYDROCHLORIDE 12.5 MG/1
12.5 TABLET ORAL ONCE
Status: COMPLETED | OUTPATIENT
Start: 2024-03-21 | End: 2024-03-21

## 2024-03-21 RX ORDER — INDOCYANINE GREEN AND WATER 25 MG
KIT INJECTION PRN
Status: DISCONTINUED | OUTPATIENT
Start: 2024-03-21 | End: 2024-03-21 | Stop reason: SDUPTHER

## 2024-03-21 RX ORDER — SODIUM CHLORIDE 9 MG/ML
INJECTION, SOLUTION INTRAVENOUS PRN
Status: DISCONTINUED | OUTPATIENT
Start: 2024-03-21 | End: 2024-03-21 | Stop reason: HOSPADM

## 2024-03-21 RX ORDER — PROCHLORPERAZINE EDISYLATE 5 MG/ML
10 INJECTION INTRAMUSCULAR; INTRAVENOUS ONCE
Status: COMPLETED | OUTPATIENT
Start: 2024-03-21 | End: 2024-03-21

## 2024-03-21 RX ORDER — SODIUM CHLORIDE 9 MG/ML
INJECTION, SOLUTION INTRAVENOUS CONTINUOUS
Status: DISCONTINUED | OUTPATIENT
Start: 2024-03-21 | End: 2024-03-21 | Stop reason: HOSPADM

## 2024-03-21 RX ORDER — ONDANSETRON 2 MG/ML
INJECTION INTRAMUSCULAR; INTRAVENOUS PRN
Status: DISCONTINUED | OUTPATIENT
Start: 2024-03-21 | End: 2024-03-21 | Stop reason: SDUPTHER

## 2024-03-21 RX ORDER — SODIUM CHLORIDE, SODIUM LACTATE, POTASSIUM CHLORIDE, CALCIUM CHLORIDE 600; 310; 30; 20 MG/100ML; MG/100ML; MG/100ML; MG/100ML
INJECTION, SOLUTION INTRAVENOUS CONTINUOUS
Status: DISCONTINUED | OUTPATIENT
Start: 2024-03-21 | End: 2024-03-21 | Stop reason: HOSPADM

## 2024-03-21 RX ORDER — LIDOCAINE HYDROCHLORIDE 20 MG/ML
INJECTION, SOLUTION EPIDURAL; INFILTRATION; INTRACAUDAL; PERINEURAL PRN
Status: DISCONTINUED | OUTPATIENT
Start: 2024-03-21 | End: 2024-03-21 | Stop reason: SDUPTHER

## 2024-03-21 RX ORDER — ONDANSETRON 4 MG/1
4 TABLET, ORALLY DISINTEGRATING ORAL 3 TIMES DAILY PRN
Qty: 21 TABLET | Refills: 0 | Status: SHIPPED | OUTPATIENT
Start: 2024-03-21

## 2024-03-21 RX ORDER — FENTANYL CITRATE 50 UG/ML
25 INJECTION, SOLUTION INTRAMUSCULAR; INTRAVENOUS EVERY 5 MIN PRN
Status: DISCONTINUED | OUTPATIENT
Start: 2024-03-21 | End: 2024-03-21 | Stop reason: HOSPADM

## 2024-03-21 RX ORDER — SODIUM CHLORIDE 0.9 % (FLUSH) 0.9 %
5-40 SYRINGE (ML) INJECTION EVERY 12 HOURS SCHEDULED
Status: DISCONTINUED | OUTPATIENT
Start: 2024-03-21 | End: 2024-03-21 | Stop reason: HOSPADM

## 2024-03-21 RX ORDER — DEXAMETHASONE SODIUM PHOSPHATE 10 MG/ML
INJECTION, SOLUTION INTRAMUSCULAR; INTRAVENOUS PRN
Status: DISCONTINUED | OUTPATIENT
Start: 2024-03-21 | End: 2024-03-21 | Stop reason: SDUPTHER

## 2024-03-21 RX ORDER — KETOROLAC TROMETHAMINE 30 MG/ML
INJECTION, SOLUTION INTRAMUSCULAR; INTRAVENOUS PRN
Status: DISCONTINUED | OUTPATIENT
Start: 2024-03-21 | End: 2024-03-21 | Stop reason: SDUPTHER

## 2024-03-21 RX ORDER — ACETAMINOPHEN 500 MG
1000 TABLET ORAL ONCE
Status: COMPLETED | OUTPATIENT
Start: 2024-03-21 | End: 2024-03-21

## 2024-03-21 RX ORDER — SCOLOPAMINE TRANSDERMAL SYSTEM 1 MG/1
1 PATCH, EXTENDED RELEASE TRANSDERMAL ONCE
Status: DISCONTINUED | OUTPATIENT
Start: 2024-03-21 | End: 2024-03-21 | Stop reason: HOSPADM

## 2024-03-21 RX ORDER — BUPIVACAINE HYDROCHLORIDE AND EPINEPHRINE 5; 5 MG/ML; UG/ML
INJECTION, SOLUTION EPIDURAL; INTRACAUDAL; PERINEURAL PRN
Status: DISCONTINUED | OUTPATIENT
Start: 2024-03-21 | End: 2024-03-21 | Stop reason: ALTCHOICE

## 2024-03-21 RX ORDER — HYDROMORPHONE HYDROCHLORIDE 1 MG/ML
0.5 INJECTION, SOLUTION INTRAMUSCULAR; INTRAVENOUS; SUBCUTANEOUS EVERY 5 MIN PRN
Status: DISCONTINUED | OUTPATIENT
Start: 2024-03-21 | End: 2024-03-21 | Stop reason: HOSPADM

## 2024-03-21 RX ORDER — DOCUSATE SODIUM 100 MG/1
100 CAPSULE, LIQUID FILLED ORAL 2 TIMES DAILY
Qty: 20 CAPSULE | Refills: 0 | Status: SHIPPED | OUTPATIENT
Start: 2024-03-21 | End: 2024-03-31

## 2024-03-21 RX ORDER — ROCURONIUM BROMIDE 10 MG/ML
INJECTION, SOLUTION INTRAVENOUS PRN
Status: DISCONTINUED | OUTPATIENT
Start: 2024-03-21 | End: 2024-03-21 | Stop reason: SDUPTHER

## 2024-03-21 RX ORDER — ONDANSETRON 2 MG/ML
4 INJECTION INTRAMUSCULAR; INTRAVENOUS
Status: COMPLETED | OUTPATIENT
Start: 2024-03-21 | End: 2024-03-21

## 2024-03-21 RX ORDER — CIPROFLOXACIN 2 MG/ML
400 INJECTION, SOLUTION INTRAVENOUS ONCE
Status: COMPLETED | OUTPATIENT
Start: 2024-03-21 | End: 2024-03-21

## 2024-03-21 RX ORDER — FENTANYL CITRATE 50 UG/ML
INJECTION, SOLUTION INTRAMUSCULAR; INTRAVENOUS PRN
Status: DISCONTINUED | OUTPATIENT
Start: 2024-03-21 | End: 2024-03-21 | Stop reason: SDUPTHER

## 2024-03-21 RX ORDER — LIDOCAINE HYDROCHLORIDE 10 MG/ML
1 INJECTION, SOLUTION EPIDURAL; INFILTRATION; INTRACAUDAL; PERINEURAL
Status: DISCONTINUED | OUTPATIENT
Start: 2024-03-21 | End: 2024-03-21 | Stop reason: HOSPADM

## 2024-03-21 RX ADMIN — ONDANSETRON 4 MG: 2 INJECTION INTRAMUSCULAR; INTRAVENOUS at 15:20

## 2024-03-21 RX ADMIN — ROCURONIUM BROMIDE 50 MG: 50 INJECTION INTRAVENOUS at 13:15

## 2024-03-21 RX ADMIN — FENTANYL CITRATE 50 MCG: 50 INJECTION INTRAMUSCULAR; INTRAVENOUS at 13:45

## 2024-03-21 RX ADMIN — DEXAMETHASONE SODIUM PHOSPHATE 10 MG: 10 INJECTION, SOLUTION INTRAMUSCULAR; INTRAVENOUS at 13:22

## 2024-03-21 RX ADMIN — SODIUM CHLORIDE, POTASSIUM CHLORIDE, SODIUM LACTATE AND CALCIUM CHLORIDE: 600; 310; 30; 20 INJECTION, SOLUTION INTRAVENOUS at 13:05

## 2024-03-21 RX ADMIN — LIDOCAINE HYDROCHLORIDE 100 MG: 20 INJECTION, SOLUTION EPIDURAL; INFILTRATION; INTRACAUDAL; PERINEURAL at 13:15

## 2024-03-21 RX ADMIN — HALOPERIDOL LACTATE 1 MG: 5 INJECTION, SOLUTION INTRAMUSCULAR at 15:47

## 2024-03-21 RX ADMIN — PROPOFOL 200 MG: 10 INJECTION, EMULSION INTRAVENOUS at 13:15

## 2024-03-21 RX ADMIN — FAMOTIDINE 20 MG: 10 INJECTION, SOLUTION INTRAVENOUS at 12:53

## 2024-03-21 RX ADMIN — PROMETHAZINE HYDROCHLORIDE 12.5 MG: 12.5 TABLET ORAL at 12:53

## 2024-03-21 RX ADMIN — ACETAMINOPHEN 1000 MG: 500 TABLET ORAL at 12:53

## 2024-03-21 RX ADMIN — CIPROFLOXACIN 400 MG: 2 INJECTION, SOLUTION INTRAVENOUS at 13:23

## 2024-03-21 RX ADMIN — INDOCYANINE GREEN AND WATER 7.5 MG: KIT at 13:53

## 2024-03-21 RX ADMIN — FENTANYL CITRATE 100 MCG: 50 INJECTION INTRAMUSCULAR; INTRAVENOUS at 13:15

## 2024-03-21 RX ADMIN — KETOROLAC TROMETHAMINE 30 MG: 30 INJECTION, SOLUTION INTRAMUSCULAR at 14:33

## 2024-03-21 RX ADMIN — ONDANSETRON 4 MG: 2 INJECTION INTRAMUSCULAR; INTRAVENOUS at 14:30

## 2024-03-21 RX ADMIN — SUGAMMADEX 500 MG: 100 INJECTION, SOLUTION INTRAVENOUS at 14:38

## 2024-03-21 RX ADMIN — PROPOFOL 100 MCG/KG/MIN: 10 INJECTION, EMULSION INTRAVENOUS at 13:20

## 2024-03-21 RX ADMIN — SODIUM CHLORIDE, POTASSIUM CHLORIDE, SODIUM LACTATE AND CALCIUM CHLORIDE: 600; 310; 30; 20 INJECTION, SOLUTION INTRAVENOUS at 14:18

## 2024-03-21 RX ADMIN — MIDAZOLAM 2 MG: 1 INJECTION INTRAMUSCULAR; INTRAVENOUS at 13:05

## 2024-03-21 RX ADMIN — FENTANYL CITRATE 50 MCG: 50 INJECTION INTRAMUSCULAR; INTRAVENOUS at 13:42

## 2024-03-21 RX ADMIN — PROCHLORPERAZINE EDISYLATE 10 MG: 5 INJECTION INTRAMUSCULAR; INTRAVENOUS at 15:25

## 2024-03-21 ASSESSMENT — PAIN - FUNCTIONAL ASSESSMENT
PAIN_FUNCTIONAL_ASSESSMENT: FACE, LEGS, ACTIVITY, CRY, AND CONSOLABILITY (FLACC)
PAIN_FUNCTIONAL_ASSESSMENT: 0-10

## 2024-03-21 NOTE — ANESTHESIA POSTPROCEDURE EVALUATION
Department of Anesthesiology  Postprocedure Note    Patient: Isha Edward  MRN: [id number]  YOB: 1985  Date of evaluation: 3/21/2024    Procedure Summary       Date: 03/21/24 Room / Location: [address]    Anesthesia Start: 1305 Anesthesia Stop: 1509    Procedure: CHOLECYSTECTOMY LAPAROSCOPIC ROBOTIC XI Diagnosis:       Calculus of gallbladder with cholecystitis without biliary obstruction, unspecified cholecystitis acuity      (Calculus of gallbladder with cholecystitis without biliary obstruction, unspecified cholecystitis acuity [K80.10])    Surgeons: Luis F Dowling IV, DO Responsible Provider: Feliz Harris DO    Anesthesia Type: general ASA Status: 3            Anesthesia Type: No value filed.    Kike Phase I: Kike Score: 9    Kike Phase II:      Anesthesia Post Evaluation    Patient location during evaluation: PACU  Patient participation: complete - patient participated  Level of consciousness: awake and alert  Airway patency: patent  Nausea & Vomiting: no vomiting and nausea  Cardiovascular status: hemodynamically stable  Respiratory status: acceptable  Hydration status: stable  Comments: LILLIANA treated in PACU   Pain management: adequate    No notable events documented.

## 2024-03-21 NOTE — H&P
Interval H&P Note    Pt Name: Isha Edward  MRN: [id number]  YOB: 1985  Date of evaluation: 3/21/2024      [x] I have reviewed in epic the PCP Preoperative Note by Dr Pina Graham dated 3/7/24 attached below for the Interval History and Physical note.     [x] I have examined  Isha Edward, a 38 y.o. female with known HTN,HLD, migraines, recent pancreatitis (11/2023), PONV, Sleep apnea using CPAP nightly, and DM II managed by PCP and specialists who arrived for her scheduled CHOLECYSTECTOMY LAPAROSCOPIC ROBOTIC XI by Luis F Dowling IV,  for Calculus of gallbladder with cholecystitis without biliary obstruction, un*. The patient denies new health changes, fever, chills, wheezing, cough, increased SOB, chest pain, open sores or wounds.     +DM POC .Dexcom 6 on left upper arm. Patient stated \"when my sugar below 110 I start to get headache and nausea.\" Usually eat something and feel better.      Vital signs: BP (!) 144/82   Pulse 91   Temp 97.5 °F (36.4 °C)   Resp 18   Ht 1.6 m (5' 3\")   Wt 131.6 kg (290 lb 1.6 oz)   SpO2 96%   BMI 51.39 kg/m²     Allergies:  Isoflurane, Penicillins, Sevoflurane, and Seasonal    Medications:    Prior to Admission medications    Medication Sig Start Date End Date Taking? Authorizing Provider   insulin regular (HUMULIN R;NOVOLIN R) 100 UNIT/ML injection Inject 8 Units into the skin daily   Yes Leif Arcos MD   metFORMIN (GLUCOPHAGE-XR) 500 MG extended release tablet Take 2 tablets by mouth daily (with breakfast) 3/7/24   Alessandra Bang MD   traMADol (ULTRAM) 50 MG tablet Take 1 tablet by mouth every 8 hours as needed for Pain. 2/1/24   Leif Arcos MD   Continuous Blood Gluc Sensor (DEXCOM G6 SENSOR) MISC USE AS DIRECTED TO MEASURE BLOOD SUGAR. CHANGE EVERY 10 DAYS 1/24/24   Alessandra Bang MD   empagliflozin (JARDIANCE) 25 MG tablet Take 1 tablet by mouth daily 1/16/24   Alessandra Bang MD   lisinopril (PRINIVIL;ZESTRIL) 5 MG  Sleep apnea     uses cpap nightly    Type 2 diabetes mellitus without complication (HCC)     Under care of team 2024    PCP: Trevin De, last visit 2023    Under care of team 2024    Pulmonary: Bryce Hitchcock MD, [facility], last visit 2023    Uses self-applied continuous glucose monitoring device     Vitamin D deficiency     Wears glasses     Wears glasses       Past Surgical History:   Procedure Laterality Date     SECTION      ESOPHAGOGASTRODUODENOSCOPY  2024    LAPAROSCOPY  2018    LAPAROSCOPY DIAGNOSTIC (N/A Abdomen), Lysis of adhesions    AR LAPS ABD PRTM&OMENTUM DX W/WO SPEC BR/WA SPX N/A 2018    LAPAROSCOPY DIAGNOSTIC performed by Alessia Montes MD at [facility] OR    UPPER GASTROINTESTINAL ENDOSCOPY N/A 2024    ENDOSCOPIC ULTRASOUND WITH PATHOLOGY performed by Kenji Truong MD at [facility] OR    UPPER GASTROINTESTINAL ENDOSCOPY  2024    EGD BIOPSY performed by Kenji Truong MD at [facility] OR    WISDOM TOOTH EXTRACTION      WRIST GANGLION EXCISION      lt wrist age 7yrs     Family History   Problem Relation Age of Onset    High Blood Pressure Mother     Diabetes type 2  Mother     Heart Attack Maternal Grandfather     High Blood Pressure Maternal Grandfather     Stroke Maternal Grandfather     Cancer Maternal Aunt     Breast Cancer Maternal Aunt     Breast Cancer Paternal Aunt     Diabetes Paternal Uncle        Current Outpatient Medications   Medication Sig Dispense Refill    metFORMIN (GLUCOPHAGE-XR) 500 MG extended release tablet Take 2 tablets by mouth daily (with breakfast) 180 tablet 3    traMADol (ULTRAM) 50 MG tablet Take 1 tablet by mouth every 8 hours as needed for Pain.      Continuous Blood Gluc Sensor (DEXCOM G6 SENSOR) MISC USE AS DIRECTED TO MEASURE BLOOD SUGAR. CHANGE EVERY 10 DAYS 3 each 2    empagliflozin (JARDIANCE) 25 MG tablet Take 1 tablet by mouth daily 90 tablet 1    lisinopril (PRINIVIL;ZESTRIL) 5 MG tablet

## 2024-03-21 NOTE — OP NOTE
Operative Note      Patient: Isha Edward  YOB: 1985  MRN: [id number]    Date of Procedure: 3/21/2024    Pre-Op Diagnosis Codes:     * Calculus of gallbladder with cholecystitis without biliary obstruction, unspecified cholecystitis acuity [K80.10]    Post-Op Diagnosis: Same       Procedure(s):  CHOLECYSTECTOMY LAPAROSCOPIC ROBOTIC XI    Surgeon(s):  Luis F Dowling IV, DO    Assistant:   Resident: Shawn Romano DO    Anesthesia: General    Estimated Blood Loss (mL): Minimal    Complications: None    Specimens:   ID Type Source Tests Collected by Time Destination   A : GALLBLADDER AND CONTENTS Tissue Gallbladder SURGICAL PATHOLOGY Luis F Dowling IV, DO 3/21/2024 1405        Implants:  Implant Name Type Inv. Item Serial No.  Lot No. LRB No. Used Action   CLIP INT L POLYMER IESHA LIG HEM O IESHA (6EA/PK) - [id number]  CLIP INT L POLYMER IESHA LIG HEM O IESHA (6EA/PK)  TELEFLEX [facility] [id number] N/A 1 Implanted         Drains: * No LDAs found *    Findings: inflamed gallbladder      Detailed Description of Procedure:   Indication:  39 yo female with months of RUQ abdominal pain. Patient was offered robotic cholecystectomy. Risks, benefits, and alternatives were discussed. Informed consent was obtained    Procedure:  The patient was brought to the operative suite and placed in the supine position. A time out was made to verify correct patient, site of procedure and procedure type. A dose of antibiotics were given according to SCIP protocol. EPC cuffs were placed for DVT prophylaxis. A Vonda Hugger was placed to keep the patient euthermic. General anesthesia was given and the patient was intubated with an ET tube. The patient's abdomen was prepped and draped in sterile fashion.    Entrance was gained into the abdomen with a Veress needle placed at Sparks's point in the left upper quadrant.  A saline drop test was used to confirm entrance into the abdomen.  The abdomen was insufflated

## 2024-03-21 NOTE — ANESTHESIA PRE PROCEDURE
TRESIBA FLEXTOUCH 100 UNIT/ML SOPN Inject 18 Units into the skin daily (with breakfast) 8/31/23   Alessandra Bang MD   Cholecalciferol (VITAMIN D3) 125 MCG (5000 UT) CAPS Take 1 capsule by mouth daily 7/6/23   Alessandra Bang MD   Continuous Blood Gluc Transmit (DEXCOM G6 TRANSMITTER) MISC USE AS DIRECTED 5/19/23   Alessandra Bang MD   [employer] PEN NEEDLES 31G X 6 MM MISC USE ONE PEN NEEDLE TO INJECT TRESIBA DAILY 4/17/23   Alessandra Bang MD   Misc. Devices MISC Cpap tubing, mouth piece and humidifier 11/7/22   Alessandra Bang MD   Continuous Blood Gluc  (DEXCOM G6 ) SONIA 1 Device by Does not apply route continuous 12/3/21   Alessandra Bang MD   Cetirizine HCl (ZYRTEC PO) Take 10 mg by mouth daily    Provider, MD Leif       Current medications:    Current Facility-Administered Medications   Medication Dose Route Frequency Provider Last Rate Last Admin    lidocaine PF 1 % injection 1 mL  1 mL IntraDERmal Once PRN Keli Pierre MD        0.9 % sodium chloride infusion   IntraVENous Continuous Keli Pierre MD        lactated ringers IV soln infusion   IntraVENous Continuous Keli Pierre  mL/hr at 03/21/24 1305 New Bag at 03/21/24 1418    sodium chloride flush 0.9 % injection 5-40 mL  5-40 mL IntraVENous 2 times per day Keli Pierre MD        sodium chloride flush 0.9 % injection 5-40 mL  5-40 mL IntraVENous PRN Keli Pierre MD        0.9 % sodium chloride infusion   IntraVENous PRN Keli Pierre MD        scopolamine (TRANSDERM-SCOP) transdermal patch 1 patch  1 patch TransDERmal Once Feliz Harris W, DO   1 patch at 03/21/24 1253    BUPivacaine-EPINEPHrine PF (MARCAINE-w/EPINEPHrine) 0.5% -[id number] injection    PRN Luis F Dowling IV, DO   23 mL at 03/21/24 1404    BUPivacaine (PF) (MARCAINE) 0.25 % injection    PRN Luis F Dowling IV, DO   30 mL at 03/21/24 1427     Facility-Administered

## 2024-03-21 NOTE — DISCHARGE INSTRUCTIONS
Discharge Instructions:    Diet:   You may resume a regular diet.    Wound Care:   Skin glue was used to cover your incision(s). Please note that this glue is tinted purple; therefore, a slight purple hue around your incisions is normal. The skin glue will fall off on its own in about 10 days. You may shower, but do not scrub the incision sites directly or soak (tub, pool, etc.).    Activity:   No heavy lifting greater than a milk jug (~8lbs) until follow up.    Pain management:   Unless informed of any restrictions by your primary care physician, please use your preferred over-the-counter pain reliever as your primary pain medication. If you have pain that persists despite over-the-counter pain medications, you have been provided with a prescription for an opioid/narcotic pain reliever (Percocet).   Be aware that this medication is a combination of opioid/narcotic and acetaminophen (the main ingredient in Tylenol); therefore, it will contribute to your daily limit of 4,000mg acetaminophen.     No driving or operating machinery while taking opioid/narcotic medications.     If you are not taking the opioid pain medication, then you can drive when you feel as though you can sit comfortably behind the steering wheel and can slam on the brake or turn the wheel sharply without it hurting. We recommend that you practice this while sitting the car with it parked in your driveway before trying to drive on the road.    Bowel Regimen:   Opioid/Narcotic pain relievers have a common side effect of constipation; therefore, you have been provided with a prescription for a stool softener, Docusate (Colace). Please note that this medication is available over-the-counter at the same dose as that available with a prescription.       This medication is  intended to help prevent you from experiencing this very common side effect and also help to regulate your bowels after surgery.  Accordingly, if you do not experience constipation,

## 2024-03-26 LAB — SURGICAL PATHOLOGY REPORT: NORMAL

## 2024-04-02 ENCOUNTER — PHARMACY VISIT (OUTPATIENT)
Dept: FAMILY MEDICINE CLINIC | Age: 39
End: 2024-04-02

## 2024-04-02 VITALS — WEIGHT: 287.8 LBS | BODY MASS INDEX: 50.98 KG/M2

## 2024-04-02 DIAGNOSIS — Z79.4 TYPE 2 DIABETES MELLITUS WITHOUT COMPLICATION, WITH LONG-TERM CURRENT USE OF INSULIN (HCC): Primary | ICD-10-CM

## 2024-04-02 DIAGNOSIS — E11.9 TYPE 2 DIABETES MELLITUS WITHOUT COMPLICATION, WITH LONG-TERM CURRENT USE OF INSULIN (HCC): Primary | ICD-10-CM

## 2024-04-02 ASSESSMENT — PATIENT HEALTH QUESTIONNAIRE - PHQ9
SUM OF ALL RESPONSES TO PHQ QUESTIONS 1-9: 0
SUM OF ALL RESPONSES TO PHQ9 QUESTIONS 1 & 2: 0
SUM OF ALL RESPONSES TO PHQ QUESTIONS 1-9: 0
2. FEELING DOWN, DEPRESSED OR HOPELESS: NOT AT ALL
SUM OF ALL RESPONSES TO PHQ QUESTIONS 1-9: 0
SUM OF ALL RESPONSES TO PHQ QUESTIONS 1-9: 0
1. LITTLE INTEREST OR PLEASURE IN DOING THINGS: NOT AT ALL

## 2024-04-02 NOTE — PATIENT INSTRUCTIONS
Begin taking docusate (Colace) over the counter once daily to start. If needed, increase to twice daily to help with regular bowel movements.  If needed, begin taking senna once daily to help with bowel movements.

## 2024-04-02 NOTE — PROGRESS NOTES
into the skin as needed (hypoglycemia) 0.4 mL 3    TRESIBA FLEXTOUCH 100 UNIT/ML SOPN Inject 18 Units into the skin daily (with breakfast) (Patient taking differently: Inject 22 Units into the skin daily (with breakfast)) 10 mL 5    Cholecalciferol (VITAMIN D3) 125 MCG (5000 UT) CAPS Take 1 capsule by mouth daily 90 capsule 3    Cetirizine HCl (ZYRTEC PO) Take 10 mg by mouth daily      Continuous Blood Gluc Sensor (DEXCOM G6 SENSOR) MISC USE AS DIRECTED TO MEASURE BLOOD SUGAR. CHANGE EVERY 10 DAYS 3 each 2    naproxen (NAPROSYN) 500 MG tablet TAKE ONE TABLET BY MOUTH TWICE A DAY WITH FOOD (Patient not taking: Reported on 4/2/2024) 60 tablet 5    Continuous Blood Gluc Transmit (DEXCOM G6 TRANSMITTER) MISC USE AS DIRECTED 1 each 3    [employer] PEN NEEDLES 31G X 6 MM MISC USE ONE PEN NEEDLE TO INJECT TRESIBA DAILY 30 each 5    Misc. Devices MISC Cpap tubing, mouth piece and humidifier 1 each 0    Continuous Blood Gluc  (DEXCOM G6 ) SONIA 1 Device by Does not apply route continuous 1 each 1     No current facility-administered medications for this visit.     ALLERGIES  Allergies   Allergen Reactions    Isoflurane Shortness Of Breath    Penicillins Anaphylaxis and Other (See Comments)    Sevoflurane Shortness Of Breath     General anest. \"Gas\"    Seasonal Itching     Current Pharmacy:  [facility]  Current testing supplies/frequency: Dexcom G6   Pen needles/syringes: generic     LABS  Diabetes Goals: Using ADA Standards of Care      Goal A1c:  Less than 7%   Fasting Blood Sugars:  80-130mg/dL  Postprandial glucose:  Less than 180mg/dL    No results found for: \"[id number]\"  Lab Results   Component Value Date    LABA1C 7.1 03/07/2024    LABA1C 6.3 12/07/2023    LABA1C 5.8 07/06/2023     Lab Results   Component Value Date    CHOL 155 12/07/2023    TRIG 147 12/07/2023    HDL 52 12/07/2023    LDLCHOLESTEROL 74 12/07/2023    LDLCALC 138 (A) 01/09/2018     ALT   Date Value Ref Range Status   11/07/2023 22 5 - 33 U/L Final

## 2024-04-20 ENCOUNTER — HOSPITAL ENCOUNTER (EMERGENCY)
Age: 39
Discharge: HOME | End: 2024-04-21
Attending: EMERGENCY MEDICINE
Payer: COMMERCIAL

## 2024-04-20 VITALS
TEMPERATURE: 97.6 F | BODY MASS INDEX: 51.53 KG/M2 | SYSTOLIC BLOOD PRESSURE: 140 MMHG | OXYGEN SATURATION: 98 % | WEIGHT: 280 LBS | HEART RATE: 89 BPM | DIASTOLIC BLOOD PRESSURE: 91 MMHG | HEIGHT: 62 IN | RESPIRATION RATE: 18 BRPM

## 2024-04-20 DIAGNOSIS — R42 DIZZINESS: Primary | ICD-10-CM

## 2024-04-20 DIAGNOSIS — R53.81 MALAISE: ICD-10-CM

## 2024-04-20 DIAGNOSIS — R51.9 NONINTRACTABLE HEADACHE, UNSPECIFIED CHRONICITY PATTERN, UNSPECIFIED HEADACHE TYPE: ICD-10-CM

## 2024-04-20 LAB
ALBUMIN SERPL-MCNC: 4 G/DL (ref 3.5–5.2)
ALP SERPL-CCNC: 62 U/L (ref 35–104)
ALT SERPL-CCNC: 24 U/L (ref 5–33)
ANION GAP SERPL CALCULATED.3IONS-SCNC: 12 MMOL/L (ref 9–17)
AST SERPL-CCNC: 14 U/L
BASOPHILS # BLD: 0.04 K/UL (ref 0–0.2)
BASOPHILS NFR BLD: 1 % (ref 0–2)
BILIRUB SERPL-MCNC: 0.4 MG/DL (ref 0.3–1.2)
BILIRUB UR QL STRIP: NEGATIVE
BUN SERPL-MCNC: 23 MG/DL (ref 6–20)
BUN/CREAT SERPL: 29 (ref 9–20)
CALCIUM SERPL-MCNC: 8.7 MG/DL (ref 8.6–10.4)
CHLORIDE SERPL-SCNC: 102 MMOL/L (ref 98–107)
CLARITY UR: CLEAR
CO2 SERPL-SCNC: 21 MMOL/L (ref 20–31)
COLOR UR: YELLOW
COMMENT: ABNORMAL
CREAT SERPL-MCNC: 0.8 MG/DL (ref 0.5–0.9)
EOSINOPHIL # BLD: 0.22 K/UL (ref 0–0.44)
EOSINOPHILS RELATIVE PERCENT: 3 % (ref 1–4)
ERYTHROCYTE [DISTWIDTH] IN BLOOD BY AUTOMATED COUNT: 13 % (ref 11.8–14.4)
GFR SERPL CREATININE-BSD FRML MDRD: >90 ML/MIN/1.73M2
GLUCOSE SERPL-MCNC: 139 MG/DL (ref 70–99)
GLUCOSE UR STRIP-MCNC: ABNORMAL MG/DL
HCG UR QL: NEGATIVE
HCT VFR BLD AUTO: 41.6 % (ref 36.3–47.1)
HGB BLD-MCNC: 13.5 G/DL (ref 11.9–15.1)
HGB UR QL STRIP.AUTO: NEGATIVE
IMM GRANULOCYTES # BLD AUTO: 0.02 K/UL (ref 0–0.3)
IMM GRANULOCYTES NFR BLD: 0 %
KETONES UR STRIP-MCNC: NEGATIVE MG/DL
LEUKOCYTE ESTERASE UR QL STRIP: NEGATIVE
LIPASE SERPL-CCNC: 26 U/L (ref 13–60)
LYMPHOCYTES NFR BLD: 2.41 K/UL (ref 1.1–3.7)
LYMPHOCYTES RELATIVE PERCENT: 30 % (ref 24–43)
MCH RBC QN AUTO: 28.8 PG (ref 25.2–33.5)
MCHC RBC AUTO-ENTMCNC: 32.5 G/DL (ref 28.4–34.8)
MCV RBC AUTO: 88.9 FL (ref 82.6–102.9)
MONOCYTES NFR BLD: 0.54 K/UL (ref 0.1–1.2)
MONOCYTES NFR BLD: 7 % (ref 3–12)
NEUTROPHILS NFR BLD: 59 % (ref 36–65)
NEUTS SEG NFR BLD: 4.83 K/UL (ref 1.5–8.1)
NITRITE UR QL STRIP: NEGATIVE
NRBC BLD-RTO: 0 PER 100 WBC
PH UR STRIP: 6.5 [PH] (ref 5–8)
PLATELET # BLD AUTO: 208 K/UL (ref 138–453)
PMV BLD AUTO: 12.1 FL (ref 8.1–13.5)
POTASSIUM SERPL-SCNC: 3.8 MMOL/L (ref 3.7–5.3)
PROT SERPL-MCNC: 6.7 G/DL (ref 6.4–8.3)
PROT UR STRIP-MCNC: NEGATIVE MG/DL
RBC # BLD AUTO: 4.68 M/UL (ref 3.95–5.11)
SODIUM SERPL-SCNC: 135 MMOL/L (ref 135–144)
SP GR UR STRIP: 1.02 (ref 1–1.03)
UROBILINOGEN UR STRIP-ACNC: NORMAL EU/DL (ref 0–1)
WBC OTHER # BLD: 8.1 K/UL (ref 3.5–11.3)

## 2024-04-20 PROCEDURE — 83690 ASSAY OF LIPASE: CPT

## 2024-04-20 PROCEDURE — 81003 URINALYSIS AUTO W/O SCOPE: CPT

## 2024-04-20 PROCEDURE — 93005 ELECTROCARDIOGRAM TRACING: CPT | Performed by: EMERGENCY MEDICINE

## 2024-04-20 PROCEDURE — 6370000000 HC RX 637 (ALT 250 FOR IP): Performed by: EMERGENCY MEDICINE

## 2024-04-20 PROCEDURE — 2580000003 HC RX 258: Performed by: EMERGENCY MEDICINE

## 2024-04-20 PROCEDURE — 99284 EMERGENCY DEPT VISIT MOD MDM: CPT

## 2024-04-20 PROCEDURE — 80053 COMPREHEN METABOLIC PANEL: CPT

## 2024-04-20 PROCEDURE — 81025 URINE PREGNANCY TEST: CPT

## 2024-04-20 PROCEDURE — 96374 THER/PROPH/DIAG INJ IV PUSH: CPT

## 2024-04-20 PROCEDURE — 87636 SARSCOV2 & INF A&B AMP PRB: CPT

## 2024-04-20 PROCEDURE — 84484 ASSAY OF TROPONIN QUANT: CPT

## 2024-04-20 PROCEDURE — 6360000002 HC RX W HCPCS: Performed by: EMERGENCY MEDICINE

## 2024-04-20 PROCEDURE — 85025 COMPLETE CBC W/AUTO DIFF WBC: CPT

## 2024-04-20 PROCEDURE — 96375 TX/PRO/DX INJ NEW DRUG ADDON: CPT

## 2024-04-20 RX ORDER — DIPHENHYDRAMINE HYDROCHLORIDE 50 MG/ML
25 INJECTION INTRAMUSCULAR; INTRAVENOUS ONCE
Status: COMPLETED | OUTPATIENT
Start: 2024-04-20 | End: 2024-04-20

## 2024-04-20 RX ORDER — ACETAMINOPHEN 325 MG/1
650 TABLET ORAL ONCE
Status: COMPLETED | OUTPATIENT
Start: 2024-04-20 | End: 2024-04-20

## 2024-04-20 RX ORDER — 0.9 % SODIUM CHLORIDE 0.9 %
1000 INTRAVENOUS SOLUTION INTRAVENOUS ONCE
Status: COMPLETED | OUTPATIENT
Start: 2024-04-20 | End: 2024-04-21

## 2024-04-20 RX ORDER — METOCLOPRAMIDE HYDROCHLORIDE 5 MG/ML
10 INJECTION INTRAMUSCULAR; INTRAVENOUS ONCE
Status: COMPLETED | OUTPATIENT
Start: 2024-04-20 | End: 2024-04-20

## 2024-04-20 RX ADMIN — DIPHENHYDRAMINE HYDROCHLORIDE 25 MG: 50 INJECTION, SOLUTION INTRAMUSCULAR; INTRAVENOUS at 23:56

## 2024-04-20 RX ADMIN — METOCLOPRAMIDE HYDROCHLORIDE 10 MG: 5 INJECTION INTRAMUSCULAR; INTRAVENOUS at 23:54

## 2024-04-20 RX ADMIN — ACETAMINOPHEN 650 MG: 325 TABLET ORAL at 23:54

## 2024-04-20 RX ADMIN — SODIUM CHLORIDE 1000 ML: 9 INJECTION, SOLUTION INTRAVENOUS at 23:58

## 2024-04-20 ASSESSMENT — PAIN DESCRIPTION - LOCATION: LOCATION: HEAD

## 2024-04-21 LAB
FLUAV RNA RESP QL NAA+PROBE: NOT DETECTED
FLUBV RNA RESP QL NAA+PROBE: NOT DETECTED
SARS-COV-2 RNA RESP QL NAA+PROBE: NOT DETECTED
SOURCE: NORMAL
SPECIMEN DESCRIPTION: NORMAL
TROPONIN I SERPL HS-MCNC: <6 NG/L (ref 0–14)

## 2024-04-21 NOTE — DISCHARGE INSTRUCTIONS
Keep yourself well-hydrated, use Tylenol and ibuprofen as needed for body aches and fevers.  If you have significant worsening of your symptoms or any new concerning symptoms come back to the ER.  Follow-up with your primary care provider.

## 2024-04-21 NOTE — ED PROVIDER NOTES
The patient was given the following medications while in the emergency department:  Orders Placed This Encounter   Medications    sodium chloride 0.9 % bolus 1,000 mL    metoclopramide (REGLAN) injection 10 mg    diphenhydrAMINE (BENADRYL) injection 25 mg    acetaminophen (TYLENOL) tablet 650 mg     CONSULTS:  None    FINAL IMPRESSION      1. Dizziness    2. Nonintractable headache, unspecified chronicity pattern, unspecified headache type    3. Malaise          DISPOSITION/PLAN   DISPOSITION Decision To Discharge 04/21/2024 12:21:52 AM      PATIENT REFERRED TO:  Alessandra Bang MD  [address]  [address] [address]  [phone]    Schedule an appointment as soon as possible for a visit       DISCHARGE MEDICATIONS:  Discharge Medication List as of 4/21/2024 12:21 AM        Isak Kwong MD  Attending Emergency Physician                    Isak Kwong MD  04/21/24 0249

## 2024-04-22 LAB
EKG ATRIAL RATE: 85 BPM
EKG P AXIS: 59 DEGREES
EKG P-R INTERVAL: 158 MS
EKG Q-T INTERVAL: 368 MS
EKG QRS DURATION: 80 MS
EKG QTC CALCULATION (BAZETT): 437 MS
EKG R AXIS: 63 DEGREES
EKG T AXIS: 48 DEGREES
EKG VENTRICULAR RATE: 85 BPM

## 2024-04-22 PROCEDURE — 93010 ELECTROCARDIOGRAM REPORT: CPT | Performed by: INTERNAL MEDICINE

## 2024-04-26 DIAGNOSIS — E11.9 CONTROLLED TYPE 2 DIABETES MELLITUS WITHOUT COMPLICATION, WITHOUT LONG-TERM CURRENT USE OF INSULIN (HCC): ICD-10-CM

## 2024-04-26 RX ORDER — PROCHLORPERAZINE 25 MG/1
SUPPOSITORY RECTAL
Qty: 3 EACH | Refills: 2 | Status: SHIPPED | OUTPATIENT
Start: 2024-04-26

## 2024-04-30 ENCOUNTER — PHARMACY VISIT (OUTPATIENT)
Dept: FAMILY MEDICINE CLINIC | Age: 39
End: 2024-04-30

## 2024-04-30 DIAGNOSIS — Z79.4 CONTROLLED TYPE 2 DIABETES MELLITUS WITHOUT COMPLICATION, WITH LONG-TERM CURRENT USE OF INSULIN (HCC): ICD-10-CM

## 2024-04-30 DIAGNOSIS — E11.9 CONTROLLED TYPE 2 DIABETES MELLITUS WITHOUT COMPLICATION, WITH LONG-TERM CURRENT USE OF INSULIN (HCC): ICD-10-CM

## 2024-04-30 RX ORDER — INSULIN DEGLUDEC 100 U/ML
25 INJECTION, SOLUTION SUBCUTANEOUS
Qty: 15 ML | Refills: 1 | Status: SHIPPED | OUTPATIENT
Start: 2024-04-30

## 2024-04-30 NOTE — PROGRESS NOTES
Discussed diabetes as a high risk factor of ASCVD.   - Continue atorvastatin  Nutrition/Lifestyle Modifications  - Patient's diet is semi-diabetic friendly  - Encouraged reading nutrition labels and focusing on limiting carbohydrate intake to 45-60 grams/meal and 15 grams/snack; at least 1 serving of protein/meal  - Recommend ~30 minutes consistent, moderately intensive, exercise/day or ~150 minutes/week. Encouraged patient - to start small, stay consistent, and increase length and types of exercise as tolerated.     Patient goals for next appointment  Increase Tresiba 25 units daily  Continue metformin 1000 mg daily and Jardiance 25 mg daily    Patient verbalized understanding of the information presented and all of the patient's questions were answered. AVS was handed to the patient. Patient advised to call the office with any additional questions or concerns.     Return to clinic in 4 week (s) for follow up.     Thank you for the consult,     Henri Fernandes, PharmD, [facility]  Ambulatory Clinical Pharmacist   [facility]ication [facility]  Phone: [phone]  [facility] Medicine  Phone: [phone] option 1    For Pharmacy Admin Tracking Only    Program: Medical Group  CPA in place:  Yes  Recommendation Provided To: Patient/Caregiver: 2 via Virtual Visit  Intervention Detail: Dose Adjustment: 1, reason: Therapy Optimization and Scheduled Appointment  Intervention Accepted By: Patient/Caregiver: 2  Gap Closed?: No   Time Spent (min): 60    Isha Connan Cheyanne was evaluated through a synchronous (real-time) audio encounter. Patient identification was verified at the start of the visit. She (or guardian if applicable) is aware that this is a billable service, which includes applicable co-pays. This visit was conducted with the patient's (and/or legal guardian's) verbal consent. She has not had a related appointment within my department in the past 7 days or scheduled within the

## 2024-05-16 DIAGNOSIS — F33.42 RECURRENT MAJOR DEPRESSIVE DISORDER, IN FULL REMISSION (HCC): ICD-10-CM

## 2024-05-16 RX ORDER — BUPROPION HYDROCHLORIDE 300 MG/1
TABLET ORAL
Qty: 30 TABLET | Refills: 5 | Status: SHIPPED | OUTPATIENT
Start: 2024-05-16

## 2024-05-28 ENCOUNTER — PHARMACY VISIT (OUTPATIENT)
Dept: FAMILY MEDICINE CLINIC | Age: 39
End: 2024-05-28

## 2024-05-28 DIAGNOSIS — E11.9 TYPE 2 DIABETES MELLITUS WITHOUT COMPLICATION, WITH LONG-TERM CURRENT USE OF INSULIN (HCC): Primary | ICD-10-CM

## 2024-05-28 DIAGNOSIS — Z79.4 TYPE 2 DIABETES MELLITUS WITHOUT COMPLICATION, WITH LONG-TERM CURRENT USE OF INSULIN (HCC): Primary | ICD-10-CM

## 2024-05-28 RX ORDER — PEN NEEDLE, DIABETIC 32 GX 1/4"
NEEDLE, DISPOSABLE MISCELLANEOUS
Qty: 100 EACH | Refills: 3 | Status: SHIPPED | OUTPATIENT
Start: 2024-05-28

## 2024-05-28 ASSESSMENT — PATIENT HEALTH QUESTIONNAIRE - PHQ9
SUM OF ALL RESPONSES TO PHQ QUESTIONS 1-9: 0
SUM OF ALL RESPONSES TO PHQ QUESTIONS 1-9: 0
SUM OF ALL RESPONSES TO PHQ9 QUESTIONS 1 & 2: 0
2. FEELING DOWN, DEPRESSED OR HOPELESS: NOT AT ALL
SUM OF ALL RESPONSES TO PHQ QUESTIONS 1-9: 0
SUM OF ALL RESPONSES TO PHQ QUESTIONS 1-9: 0
1. LITTLE INTEREST OR PLEASURE IN DOING THINGS: NOT AT ALL

## 2024-05-28 NOTE — PROGRESS NOTES
Clinical Pharmacy Note    Isha Edward is a 38 y.o. female, referred to Clinical Pharmacy for medication management by Alessandra Bang MD. Isha Edward was seen today for diabetes management.     BG running around 120-130      Allergies   Allergen Reactions    Isoflurane Shortness Of Breath    Penicillins Anaphylaxis and Other (See Comments)    Sevoflurane Shortness Of Breath     General anest. \"Gas\"    Seasonal Itching        Past Medical History:   Diagnosis Date    Anesthesia complication     O2 stat dropped, bp dropped, trouble breathing, dizziness, N/V    Anxiety and depression     Depression     Essential hypertension     Patient denies but on PCP note from 2023- states Rx was for renal protection, and has never had HTN    Migraines     Mixed hyperlipidemia     Patient denies but on PCP note from 2023    Obesity     Pancreatitis, acute 2023    Personal history of other medical treatment     Numbness and tingling bilateral hands    PONV (postoperative nausea and vomiting)     Seasonal allergies     Sleep apnea     uses cpap nightly    Type 2 diabetes mellitus without complication (HCC)     Under care of team 2024    PCP: Dr. Alessandra Bang [place], last visit 2023    Under care of team 2024    Pulmonary: Bryce Hitchcock MD, [facility], last visit 2023    Uses self-applied continuous glucose monitoring device     Vitamin D deficiency     Wears glasses     Wears glasses       Social History     Tobacco Use    Smoking status: Former     Current packs/day: 0.00     Average packs/day: 0.5 packs/day for 10.0 years (5.0 ttl pk-yrs)     Types: Cigarettes     Start date:      Quit date: 2015     Years since quittin.4    Smokeless tobacco: Never    Tobacco comments:     quit smoking 4 yrs ago   Substance Use Topics    Alcohol use: Yes     Comment: social     Family History   Problem Relation Age of Onset    High Blood Pressure Mother     Diabetes type 2  Mother     Heart

## 2024-05-28 NOTE — TELEPHONE ENCOUNTER
Isha Edward is calling to request a refill on the following medication(s):    Last Visit Date (If Applicable):  12/7/2023    Next Visit Date:    6/13/2024    Medication Request:  Requested Prescriptions     Pending Prescriptions Disp Refills    DROPLET PEN NEEDLES 32G X 6 MM MISC [Pharmacy Med Name: DROPLET PEN NEEDLE 32GX1/4\"] 100 each      Sig: USE ONE PEN NEEDLE TO INJECT TRESIBA DAILY

## 2024-06-13 ENCOUNTER — OFFICE VISIT (OUTPATIENT)
Dept: FAMILY MEDICINE CLINIC | Age: 39
End: 2024-06-13
Payer: COMMERCIAL

## 2024-06-13 VITALS
DIASTOLIC BLOOD PRESSURE: 84 MMHG | WEIGHT: 292 LBS | HEART RATE: 94 BPM | SYSTOLIC BLOOD PRESSURE: 132 MMHG | BODY MASS INDEX: 53.41 KG/M2

## 2024-06-13 DIAGNOSIS — F34.1 DYSTHYMIA: ICD-10-CM

## 2024-06-13 DIAGNOSIS — E66.01 MORBID OBESITY DUE TO EXCESS CALORIES (HCC): ICD-10-CM

## 2024-06-13 DIAGNOSIS — E78.2 MIXED HYPERLIPIDEMIA: ICD-10-CM

## 2024-06-13 DIAGNOSIS — Z79.4 TYPE 2 DIABETES MELLITUS WITHOUT COMPLICATION, WITH LONG-TERM CURRENT USE OF INSULIN (HCC): Primary | ICD-10-CM

## 2024-06-13 DIAGNOSIS — I10 ESSENTIAL HYPERTENSION: ICD-10-CM

## 2024-06-13 DIAGNOSIS — G47.33 OBSTRUCTIVE SLEEP APNEA SYNDROME: ICD-10-CM

## 2024-06-13 DIAGNOSIS — E11.9 TYPE 2 DIABETES MELLITUS WITHOUT COMPLICATION, WITH LONG-TERM CURRENT USE OF INSULIN (HCC): Primary | ICD-10-CM

## 2024-06-13 PROCEDURE — 99214 OFFICE O/P EST MOD 30 MIN: CPT | Performed by: FAMILY MEDICINE

## 2024-06-13 PROCEDURE — 3079F DIAST BP 80-89 MM HG: CPT | Performed by: FAMILY MEDICINE

## 2024-06-13 PROCEDURE — 3051F HG A1C>EQUAL 7.0%<8.0%: CPT | Performed by: FAMILY MEDICINE

## 2024-06-13 PROCEDURE — 3075F SYST BP GE 130 - 139MM HG: CPT | Performed by: FAMILY MEDICINE

## 2024-06-13 ASSESSMENT — ENCOUNTER SYMPTOMS
EYES NEGATIVE: 1
ABDOMINAL PAIN: 0
SHORTNESS OF BREATH: 0
COUGH: 0
DIARRHEA: 0
ALLERGIC/IMMUNOLOGIC NEGATIVE: 1
BLOOD IN STOOL: 0
CONSTIPATION: 0

## 2024-06-13 NOTE — PROGRESS NOTES
Take 10 mg by mouth daily       No current facility-administered medications for this visit.       ALLERGIES    Allergies   Allergen Reactions    Isoflurane Shortness Of Breath    Penicillins Anaphylaxis and Other (See Comments)    Sevoflurane Shortness Of Breath     General anest. \"Gas\"    Seasonal Itching       PHYSICAL EXAM   Physical Exam  Vitals reviewed.   Constitutional:       Appearance: She is well-developed. She is obese.   HENT:      Head: Normocephalic.   Eyes:      Pupils: Pupils are equal, round, and reactive to light.   Neck:      Thyroid: No thyromegaly.   Cardiovascular:      Rate and Rhythm: Normal rate and regular rhythm.      Heart sounds: Normal heart sounds. No murmur heard.  Pulmonary:      Effort: Pulmonary effort is normal.      Breath sounds: Normal breath sounds. No wheezing or rales.   Abdominal:      Palpations: Abdomen is soft.      Tenderness: There is no abdominal tenderness. There is no guarding or rebound.   Musculoskeletal:         General: No tenderness or deformity. Normal range of motion.      Cervical back: Normal range of motion and neck supple.   Lymphadenopathy:      Cervical: No cervical adenopathy.   Skin:     General: Skin is warm and dry.   Neurological:      Mental Status: She is alert and oriented to person, place, and time.   Psychiatric:         Mood and Affect: Mood normal.         Behavior: Behavior normal.         Thought Content: Thought content normal.         Judgment: Judgment normal.         ASSESSMENT/PLAN  1. Type 2 diabetes mellitus without complication, with long-term current use of insulin (HCC)  Chronic, not as well-controlled as when using Ozempic or Mounjaro.  Cannot restart injectable due to history of pancreatitis.  Following with clinical pharmacist.    2. Morbid obesity due to excess calories (HCC)  Refer to discuss possible surgery.  Trial of phentermine-topiramate.  Discussed potential side effects.  Reviewed medication with clinical pharmacist

## 2024-06-18 DIAGNOSIS — E78.2 MIXED HYPERLIPIDEMIA: ICD-10-CM

## 2024-06-18 DIAGNOSIS — E11.9 CONTROLLED TYPE 2 DIABETES MELLITUS WITHOUT COMPLICATION, WITHOUT LONG-TERM CURRENT USE OF INSULIN (HCC): ICD-10-CM

## 2024-06-18 RX ORDER — LISINOPRIL 5 MG/1
TABLET ORAL
Qty: 30 TABLET | Refills: 5 | Status: SHIPPED | OUTPATIENT
Start: 2024-06-18

## 2024-06-18 RX ORDER — ATORVASTATIN CALCIUM 20 MG/1
20 TABLET, FILM COATED ORAL DAILY
Qty: 30 TABLET | Refills: 5 | Status: SHIPPED | OUTPATIENT
Start: 2024-06-18

## 2024-06-19 DIAGNOSIS — E11.9 CONTROLLED TYPE 2 DIABETES MELLITUS WITHOUT COMPLICATION, WITHOUT LONG-TERM CURRENT USE OF INSULIN (HCC): ICD-10-CM

## 2024-06-19 RX ORDER — PROCHLORPERAZINE 25 MG/1
SUPPOSITORY RECTAL
Qty: 1 EACH | Refills: 3 | Status: SHIPPED | OUTPATIENT
Start: 2024-06-19

## 2024-06-19 NOTE — TELEPHONE ENCOUNTER
Isha Edward is calling to request a refill on the following medication(s):    Last Visit Date (If Applicable):  6/13/2024    Next Visit Date:    Visit date not found    Medication Request:  Requested Prescriptions     Pending Prescriptions Disp Refills    Continuous Glucose Transmitter (DEXCOM G6 TRANSMITTER) MISC [Pharmacy Med Name: DEXCOM G6 TRANSMITTER] 1 each 3     Sig: USE AS DIRECTED

## 2024-06-27 ENCOUNTER — TELEPHONE (OUTPATIENT)
Dept: FAMILY MEDICINE CLINIC | Age: 39
End: 2024-06-27

## 2024-06-27 ENCOUNTER — PHARMACY VISIT (OUTPATIENT)
Dept: FAMILY MEDICINE CLINIC | Age: 39
End: 2024-06-27

## 2024-06-27 VITALS
BODY MASS INDEX: 53.22 KG/M2 | DIASTOLIC BLOOD PRESSURE: 79 MMHG | WEIGHT: 291 LBS | SYSTOLIC BLOOD PRESSURE: 114 MMHG | HEART RATE: 80 BPM

## 2024-06-27 DIAGNOSIS — E11.9 TYPE 2 DIABETES MELLITUS WITHOUT COMPLICATION, WITH LONG-TERM CURRENT USE OF INSULIN (HCC): Primary | ICD-10-CM

## 2024-06-27 DIAGNOSIS — Z79.4 TYPE 2 DIABETES MELLITUS WITHOUT COMPLICATION, WITH LONG-TERM CURRENT USE OF INSULIN (HCC): Primary | ICD-10-CM

## 2024-06-27 DIAGNOSIS — E66.01 MORBID OBESITY DUE TO EXCESS CALORIES (HCC): Primary | ICD-10-CM

## 2024-06-27 LAB — HBA1C MFR BLD: 6.5 %

## 2024-06-27 PROCEDURE — 83036 HEMOGLOBIN GLYCOSYLATED A1C: CPT | Performed by: FAMILY MEDICINE

## 2024-06-27 RX ORDER — PHENTERMINE HYDROCHLORIDE 37.5 MG/1
37.5 TABLET ORAL
Qty: 30 TABLET | Refills: 0 | Status: SHIPPED | OUTPATIENT
Start: 2024-06-27 | End: 2024-07-27

## 2024-06-27 ASSESSMENT — PATIENT HEALTH QUESTIONNAIRE - PHQ9
2. FEELING DOWN, DEPRESSED OR HOPELESS: NOT AT ALL
SUM OF ALL RESPONSES TO PHQ9 QUESTIONS 1 & 2: 0
1. LITTLE INTEREST OR PLEASURE IN DOING THINGS: NOT AT ALL
SUM OF ALL RESPONSES TO PHQ QUESTIONS 1-9: 0

## 2024-06-27 NOTE — TELEPHONE ENCOUNTER
Medication Management Service    Date: 6/27/2024  Patient's Name: Isha Edward YOB: 1985            _____________________________________________________________________________________________    Patient is unable to afford Qysimia as it is $198 for a 30 day supply. Discussed copay card with patient and potential $75 a month. Patient is interested in Adipex prior to going to weight management. Will route to Alessandra Bang MD for approval.    Henri Fernandes, PharmD, [facility]  Ambulatory Clinical Pharmacist   [facility] Specialty Medication Service  Phone: [phone]  [facility] Medicine  Phone: [phone] option 1    For Pharmacy Admin Tracking Only    Program: Medical Group  CPA in place:  Yes  Recommendation Provided To: Provider: 1 via Note to Provider  Intervention Detail: New Rx: 1, reason: Needs Additional Therapy, Patient Preference  Intervention Accepted By: Provider: 1  Gap Closed?: Yes   Time Spent (min): 20    
Pt informed.    
RX sent inform pt for phentermine.  She needs a weight check in 4 weeks  
none

## 2024-06-27 NOTE — PROGRESS NOTES
Clinical Pharmacy Note    Isha Edward is a 39 y.o. female, referred to Clinical Pharmacy for medication management by Alessandra Bang MD. Isha Edward was seen today for diabetes management.     Allergies   Allergen Reactions    Isoflurane Shortness Of Breath    Penicillins Anaphylaxis and Other (See Comments)    Sevoflurane Shortness Of Breath     General anest. \"Gas\"    Seasonal Itching        Past Medical History:   Diagnosis Date    Anesthesia complication     O2 stat dropped, bp dropped, trouble breathing, dizziness, N/V    Anxiety and depression     Depression     Essential hypertension     Patient denies but on PCP note from 2023- states Rx was for renal protection, and has never had HTN    Migraines     Mixed hyperlipidemia     Patient denies but on PCP note from 2023    Obesity     Pancreatitis, acute 2023    PONV (postoperative nausea and vomiting)     Seasonal allergies     Sleep apnea     uses cpap nightly    Type 2 diabetes mellitus without complication (HCC)     Under care of team 2024    PCP: Trevin De, last visit 2023    Under care of team 2024    Pulmonary: Bryce Hitchcock MD, [facility], last visit 2023    Uses self-applied continuous glucose monitoring device     Vitamin D deficiency     Wears glasses       Social History     Tobacco Use    Smoking status: Former     Current packs/day: 0.00     Average packs/day: 0.5 packs/day for 10.0 years (5.0 ttl pk-yrs)     Types: Cigarettes     Start date:      Quit date: 2015     Years since quittin.4    Smokeless tobacco: Never    Tobacco comments:     quit smoking 4 yrs ago   Substance Use Topics    Alcohol use: Yes     Comment: social     Family History   Problem Relation Age of Onset    Hypertension Mother     High Blood Pressure Mother     Diabetes type 2  Mother     Arrhythmia Mother     Hypertension Father     Heart Attack Maternal Grandfather     High Blood Pressure Maternal

## 2024-07-16 DIAGNOSIS — E55.9 VITAMIN D DEFICIENCY: ICD-10-CM

## 2024-07-16 DIAGNOSIS — E11.9 TYPE 2 DIABETES MELLITUS WITHOUT COMPLICATION, WITH LONG-TERM CURRENT USE OF INSULIN (HCC): ICD-10-CM

## 2024-07-16 DIAGNOSIS — Z79.4 TYPE 2 DIABETES MELLITUS WITHOUT COMPLICATION, WITH LONG-TERM CURRENT USE OF INSULIN (HCC): ICD-10-CM

## 2024-07-16 RX ORDER — EMPAGLIFLOZIN 25 MG/1
25 TABLET, FILM COATED ORAL DAILY
Qty: 30 TABLET | Refills: 3 | Status: SHIPPED | OUTPATIENT
Start: 2024-07-16

## 2024-07-16 NOTE — TELEPHONE ENCOUNTER
Isha Edward is calling to request a refill on the following medication(s):    Last Visit Date (If Applicable):  6/13/2024    Next Visit Date:    Visit date not found    Medication Request:  Requested Prescriptions     Pending Prescriptions Disp Refills    Cholecalciferol (VITAMIN D3) 125 MCG (5000 UT) CAPS [Pharmacy Med Name: VITAMIN D3 125 MCG CAPSULE] 90 capsule 3     Sig: TAKE 1 CAPSULE BY MOUTH DAILY

## 2024-07-30 DIAGNOSIS — E11.9 CONTROLLED TYPE 2 DIABETES MELLITUS WITHOUT COMPLICATION, WITHOUT LONG-TERM CURRENT USE OF INSULIN (HCC): ICD-10-CM

## 2024-07-31 RX ORDER — PROCHLORPERAZINE 25 MG/1
SUPPOSITORY RECTAL
Qty: 3 EACH | Refills: 2 | Status: SHIPPED | OUTPATIENT
Start: 2024-07-31

## 2024-08-02 DIAGNOSIS — E66.01 MORBID OBESITY DUE TO EXCESS CALORIES (HCC): ICD-10-CM

## 2024-08-04 RX ORDER — PHENTERMINE HYDROCHLORIDE 37.5 MG/1
37.5 TABLET ORAL
Qty: 30 TABLET | Refills: 0 | OUTPATIENT
Start: 2024-08-04 | End: 2024-09-03

## 2024-08-05 NOTE — TELEPHONE ENCOUNTER
Patient was informed that appt is needed for med refill of adipex. Patient is unable to come into office due to work schedule, states that she has an upcoming apt with pharmacist and at this time is okay with not being given adipex refiil due to improvement in diabetes diagnosis.

## 2024-08-22 ENCOUNTER — TELEPHONE (OUTPATIENT)
Dept: FAMILY MEDICINE CLINIC | Age: 39
End: 2024-08-22

## 2024-08-22 ENCOUNTER — PHARMACY VISIT (OUTPATIENT)
Dept: FAMILY MEDICINE CLINIC | Age: 39
End: 2024-08-22

## 2024-08-22 VITALS
DIASTOLIC BLOOD PRESSURE: 82 MMHG | HEART RATE: 79 BPM | SYSTOLIC BLOOD PRESSURE: 116 MMHG | WEIGHT: 291.6 LBS | BODY MASS INDEX: 53.33 KG/M2

## 2024-08-22 DIAGNOSIS — E66.01 MORBID OBESITY DUE TO EXCESS CALORIES (HCC): Primary | ICD-10-CM

## 2024-08-22 DIAGNOSIS — E11.9 CONTROLLED TYPE 2 DIABETES MELLITUS WITHOUT COMPLICATION, WITHOUT LONG-TERM CURRENT USE OF INSULIN (HCC): Primary | ICD-10-CM

## 2024-08-22 RX ORDER — PHENTERMINE HYDROCHLORIDE 37.5 MG/1
37.5 TABLET ORAL
Qty: 30 TABLET | Refills: 0 | Status: SHIPPED | OUTPATIENT
Start: 2024-08-22 | End: 2024-09-21

## 2024-08-22 NOTE — PROGRESS NOTES
include lisinopril. Adherence is Very Good  - Continue lisinopril  Cholesterol  - LDL is 74  - Current medications include atorvastatin. Adherence is Very Good  - Discussed diabetes as a high risk factor of ASCVD.   - Continue atorvastatin  Nutrition/Lifestyle Modifications  - Patient's diet is semi-diabetic friendly  - Encouraged reading nutrition labels and focusing on limiting carbohydrate intake to 45-60 grams/meal and 15 grams/snack; at least 1 serving of protein/meal  - Recommend ~30 minutes consistent, moderately intensive, exercise/day or ~150 minutes/week. Encouraged patient - to start small, stay consistent, and increase length and types of exercise as tolerated.     Patient goals for next appointment  - Continue Januvia 100 mg daily, metformin 1000 mg daily, Jardiance 25 mg daily, and Tresiba 25 units daily     Patient verbalized understanding of the information presented and all of the patient's questions were answered. AVS was handed to the patient. Patient advised to call the office with any additional questions or concerns.     Return to clinic in 8 week (s) for follow up.     Thank you for the consult,     Henri Fernandes, PharmD, [facility]  Ambulatory Clinical Pharmacist   [facility] Specialty Medication Service  Phone: [phone]  [facility] Medicine  Phone: [phone] option 1    For Pharmacy Admin Tracking Only    Program: Medical Group  CPA in place:  Yes  Recommendation Provided To: Patient/Caregiver: 1 via In person  Intervention Detail: Scheduled Appointment  Intervention Accepted By: Patient/Caregiver: 1  Gap Closed?: Yes   Time Spent (min): 60

## 2024-08-22 NOTE — PROGRESS NOTES
include lisinopril. Adherence is Very Good  - Continue lisinopril  Cholesterol  - LDL is 74  - Current medications include atorvastatin. Adherence is Very Good  - Discussed diabetes as a high risk factor of ASCVD.   - Continue atorvastatin  Nutrition/Lifestyle Modifications  - Patient's diet is semi-diabetic friendly  - Encouraged reading nutrition labels and focusing on limiting carbohydrate intake to 45-60 grams/meal and 15 grams/snack; at least 1 serving of protein/meal  - Recommend ~30 minutes consistent, moderately intensive, exercise/day or ~150 minutes/week. Encouraged patient - to start small, stay consistent, and increase length and types of exercise as tolerated.     Patient goals for next appointment  - Continue Januvia 100 mg daily, metformin 1000 mg daily, Jardiance 25 mg daily, and Tresiba 25 units daily     Patient verbalized understanding of the information presented and all of the patient's questions were answered. AVS was handed to the patient. Patient advised to call the office with any additional questions or concerns.     Return to clinic in 8 week (s) for follow up.     Thank you for the consult,     Henri Fernandes, PharmD, [facility]  Ambulatory Clinical Pharmacist   [facility] Specialty Medication Service  Phone: [phone]  [facility] Medicine  Phone: [phone] option 1

## 2024-09-15 DIAGNOSIS — Z79.4 CONTROLLED TYPE 2 DIABETES MELLITUS WITHOUT COMPLICATION, WITH LONG-TERM CURRENT USE OF INSULIN (HCC): ICD-10-CM

## 2024-09-15 DIAGNOSIS — M79.642 BILATERAL HAND PAIN: ICD-10-CM

## 2024-09-15 DIAGNOSIS — M79.641 BILATERAL HAND PAIN: ICD-10-CM

## 2024-09-15 DIAGNOSIS — E11.9 CONTROLLED TYPE 2 DIABETES MELLITUS WITHOUT COMPLICATION, WITH LONG-TERM CURRENT USE OF INSULIN (HCC): ICD-10-CM

## 2024-09-16 ENCOUNTER — TELEPHONE (OUTPATIENT)
Dept: FAMILY MEDICINE CLINIC | Age: 39
End: 2024-09-16

## 2024-09-16 RX ORDER — NAPROXEN 500 MG/1
TABLET ORAL
Qty: 60 TABLET | Refills: 5 | Status: SHIPPED | OUTPATIENT
Start: 2024-09-16

## 2024-09-16 RX ORDER — INSULIN DEGLUDEC 100 U/ML
INJECTION, SOLUTION SUBCUTANEOUS
Qty: 6 ML | Refills: 5 | Status: SHIPPED | OUTPATIENT
Start: 2024-09-16

## 2024-09-20 ENCOUNTER — TELEMEDICINE (OUTPATIENT)
Dept: FAMILY MEDICINE CLINIC | Age: 39
End: 2024-09-20
Payer: COMMERCIAL

## 2024-09-20 DIAGNOSIS — Z79.4 CONTROLLED TYPE 2 DIABETES MELLITUS WITHOUT COMPLICATION, WITH LONG-TERM CURRENT USE OF INSULIN (HCC): ICD-10-CM

## 2024-09-20 DIAGNOSIS — E66.01 MORBID OBESITY DUE TO EXCESS CALORIES (HCC): Primary | ICD-10-CM

## 2024-09-20 DIAGNOSIS — E11.9 CONTROLLED TYPE 2 DIABETES MELLITUS WITHOUT COMPLICATION, WITH LONG-TERM CURRENT USE OF INSULIN (HCC): ICD-10-CM

## 2024-09-20 PROCEDURE — 3044F HG A1C LEVEL LT 7.0%: CPT | Performed by: FAMILY MEDICINE

## 2024-09-20 PROCEDURE — 99213 OFFICE O/P EST LOW 20 MIN: CPT | Performed by: FAMILY MEDICINE

## 2024-09-20 RX ORDER — PHENTERMINE HYDROCHLORIDE 37.5 MG/1
37.5 TABLET ORAL
Qty: 30 TABLET | Refills: 0 | Status: SHIPPED | OUTPATIENT
Start: 2024-09-20 | End: 2024-10-20

## 2024-09-20 ASSESSMENT — ENCOUNTER SYMPTOMS
SHORTNESS OF BREATH: 0
EYES NEGATIVE: 1
COUGH: 0
ALLERGIC/IMMUNOLOGIC NEGATIVE: 1

## 2024-09-23 ENCOUNTER — TELEPHONE (OUTPATIENT)
Dept: PULMONOLOGY | Age: 39
End: 2024-09-23

## 2024-09-24 ENCOUNTER — TELEMEDICINE (OUTPATIENT)
Dept: PULMONOLOGY | Age: 39
End: 2024-09-24
Payer: COMMERCIAL

## 2024-09-24 DIAGNOSIS — J30.2 SEASONAL ALLERGIES: ICD-10-CM

## 2024-09-24 DIAGNOSIS — G47.33 OSA ON CPAP: Primary | ICD-10-CM

## 2024-09-24 DIAGNOSIS — E66.01 MORBID OBESITY WITH BMI OF 50.0-59.9, ADULT: ICD-10-CM

## 2024-09-24 PROCEDURE — 99213 OFFICE O/P EST LOW 20 MIN: CPT | Performed by: INTERNAL MEDICINE

## 2024-09-24 ASSESSMENT — SLEEP AND FATIGUE QUESTIONNAIRES
HOW LIKELY ARE YOU TO NOD OFF OR FALL ASLEEP WHILE SITTING AND TALKING TO SOMEONE: WOULD NEVER DOZE
HOW LIKELY ARE YOU TO NOD OFF OR FALL ASLEEP WHILE SITTING QUIETLY AFTER LUNCH WITHOUT ALCOHOL: WOULD NEVER DOZE
HOW LIKELY ARE YOU TO NOD OFF OR FALL ASLEEP WHEN YOU ARE A PASSENGER IN A CAR FOR AN HOUR WITHOUT A BREAK: WOULD NEVER DOZE
HOW LIKELY ARE YOU TO NOD OFF OR FALL ASLEEP WHILE LYING DOWN TO REST IN THE AFTERNOON WHEN CIRCUMSTANCES PERMIT: WOULD NEVER DOZE
HOW LIKELY ARE YOU TO NOD OFF OR FALL ASLEEP WHILE SITTING INACTIVE IN A PUBLIC PLACE: WOULD NEVER DOZE
HOW LIKELY ARE YOU TO NOD OFF OR FALL ASLEEP IN A CAR, WHILE STOPPED FOR A FEW MINUTES IN TRAFFIC: WOULD NEVER DOZE
HOW LIKELY ARE YOU TO NOD OFF OR FALL ASLEEP WHILE SITTING AND READING: SLIGHT CHANCE OF DOZING
ESS TOTAL SCORE: 1
HOW LIKELY ARE YOU TO NOD OFF OR FALL ASLEEP WHILE WATCHING TV: WOULD NEVER DOZE

## 2024-10-22 ENCOUNTER — OFFICE VISIT (OUTPATIENT)
Dept: FAMILY MEDICINE CLINIC | Age: 39
End: 2024-10-22
Payer: COMMERCIAL

## 2024-10-22 ENCOUNTER — HOSPITAL ENCOUNTER (OUTPATIENT)
Age: 39
Setting detail: SPECIMEN
Discharge: HOME | End: 2024-10-22

## 2024-10-22 ENCOUNTER — PHARMACY VISIT (OUTPATIENT)
Dept: FAMILY MEDICINE CLINIC | Age: 39
End: 2024-10-22

## 2024-10-22 VITALS — WEIGHT: 285.6 LBS | BODY MASS INDEX: 52.24 KG/M2

## 2024-10-22 VITALS
OXYGEN SATURATION: 98 % | HEIGHT: 62 IN | DIASTOLIC BLOOD PRESSURE: 80 MMHG | HEART RATE: 90 BPM | SYSTOLIC BLOOD PRESSURE: 118 MMHG | RESPIRATION RATE: 15 BRPM | BODY MASS INDEX: 52.44 KG/M2 | WEIGHT: 285 LBS | TEMPERATURE: 97.6 F

## 2024-10-22 DIAGNOSIS — E11.9 TYPE 2 DIABETES MELLITUS WITHOUT COMPLICATION, WITH LONG-TERM CURRENT USE OF INSULIN (HCC): ICD-10-CM

## 2024-10-22 DIAGNOSIS — E11.9 TYPE 2 DIABETES MELLITUS WITHOUT COMPLICATION, WITH LONG-TERM CURRENT USE OF INSULIN (HCC): Primary | ICD-10-CM

## 2024-10-22 DIAGNOSIS — F34.1 DYSTHYMIA: ICD-10-CM

## 2024-10-22 DIAGNOSIS — I10 ESSENTIAL HYPERTENSION: ICD-10-CM

## 2024-10-22 DIAGNOSIS — Z79.4 TYPE 2 DIABETES MELLITUS WITHOUT COMPLICATION, WITH LONG-TERM CURRENT USE OF INSULIN (HCC): ICD-10-CM

## 2024-10-22 DIAGNOSIS — E66.01 MORBID OBESITY DUE TO EXCESS CALORIES: Primary | ICD-10-CM

## 2024-10-22 DIAGNOSIS — E78.2 MIXED HYPERLIPIDEMIA: ICD-10-CM

## 2024-10-22 DIAGNOSIS — E55.9 VITAMIN D DEFICIENCY: ICD-10-CM

## 2024-10-22 DIAGNOSIS — Z79.4 TYPE 2 DIABETES MELLITUS WITHOUT COMPLICATION, WITH LONG-TERM CURRENT USE OF INSULIN (HCC): Primary | ICD-10-CM

## 2024-10-22 LAB
CREAT UR-MCNC: 122 MG/DL (ref 28–217)
HBA1C MFR BLD: 6 %
TOTAL PROTEIN, URINE: 8 MG/DL
URINE TOTAL PROTEIN CREATININE RATIO: 0.07

## 2024-10-22 PROCEDURE — 83036 HEMOGLOBIN GLYCOSYLATED A1C: CPT | Performed by: PHARMACIST

## 2024-10-22 PROCEDURE — 3079F DIAST BP 80-89 MM HG: CPT | Performed by: FAMILY MEDICINE

## 2024-10-22 PROCEDURE — 99214 OFFICE O/P EST MOD 30 MIN: CPT | Performed by: FAMILY MEDICINE

## 2024-10-22 PROCEDURE — 3074F SYST BP LT 130 MM HG: CPT | Performed by: FAMILY MEDICINE

## 2024-10-22 PROCEDURE — 3044F HG A1C LEVEL LT 7.0%: CPT | Performed by: FAMILY MEDICINE

## 2024-10-22 RX ORDER — PHENTERMINE HYDROCHLORIDE 37.5 MG/1
37.5 TABLET ORAL
Qty: 30 TABLET | Refills: 0 | Status: SHIPPED | OUTPATIENT
Start: 2024-10-22 | End: 2024-11-21

## 2024-10-22 ASSESSMENT — ENCOUNTER SYMPTOMS
SHORTNESS OF BREATH: 0
ABDOMINAL PAIN: 0
COUGH: 0
EYES NEGATIVE: 1
ALLERGIC/IMMUNOLOGIC NEGATIVE: 1
BLOOD IN STOOL: 0
DIARRHEA: 0
CONSTIPATION: 0

## 2024-10-22 NOTE — PATIENT INSTRUCTIONS
Thank you for choosing [facility].  We know you have options when it comes to your healthcare; we appreciate that you chose us. Our goal is to provide exceptional  service and world class care to every patient.  You will be receiving a survey via email or text message asking for your feedback.  Please take a few minutes to share your thoughts about your recent visit. Your comments helps us understand what we do well and ways we can improve.  Thank you in advance for your valuable feedback.              New Updates for [facility]    Thank you for choosing [facility] to give you the best care! [facility] is always trying to think of new ways to help their patients. We are asking all patients to try out the new digital registration that is now available through the [facility]. Down load today!. Via the pattie you're now able to update your personal and registration information prior to your upcoming appointment. This will save you time once you arrive at the office to check-in, not to mention your information remains safe!! Many other perks come from signing up for an account, such as:  Requesting refills  Scheduling an appointment  Completing an E-Visit  Sending a message to the office/provider  Having access to your medication list  Paying your bill/copay prior to your appointment  Scheduling your yearly mammogram  Review your test results    If you are not familiar the [facility], please ask one of us and we will be happy to answer any questions or help you set-up your account.

## 2024-10-22 NOTE — PROGRESS NOTES
[facility]  [facility] MEDICINE  [address]  [address]  [facility] [address]  Dept: [phone]    10/22/2024    CHIEF COMPLAINT    Chief Complaint   Patient presents with    weight check       HPI    Isha Edward is a 39 y.o. female who presents   Chief Complaint   Patient presents with    weight check   .  Weight check on Adipex.  Has continued to lose weight.  Has some constipation but otherwise no side effects.  Was seen today by clinical pharmacist for diabetes.  A1c was 6.0.  Taking medications as prescribed for diabetes, high cholesterol, depression and allergies with good effectiveness and no side effects.        Vitals:    10/22/24 0825   BP: 118/80   Site: Left Upper Arm   Position: Sitting   Cuff Size: Large Adult   Pulse: 90   Resp: 15   Temp: 97.6 °F (36.4 °C)   TempSrc: Temporal   SpO2: 98%   Weight: 129.3 kg (285 lb)   Height: 1.575 m (5' 2\")       REVIEW OF SYSTEMS    Review of Systems   Constitutional:  Negative for fatigue, fever and unexpected weight change.   HENT: Negative.     Eyes: Negative.    Respiratory:  Negative for cough and shortness of breath.    Cardiovascular:  Negative for chest pain and leg swelling.   Gastrointestinal:  Negative for abdominal pain, blood in stool, constipation and diarrhea.   Endocrine: Negative.    Genitourinary:  Negative for frequency and urgency.   Musculoskeletal: Negative.    Skin: Negative.    Allergic/Immunologic: Negative.    Neurological:  Negative for dizziness and headaches.   Hematological: Negative.    Psychiatric/Behavioral:  Positive for dysphoric mood. Negative for sleep disturbance. The patient is not nervous/anxious.        PAST MEDICAL HISTORY    Past Medical History:   Diagnosis Date    Anesthesia complication     O2 stat dropped, bp dropped, trouble breathing, dizziness, N/V    Anxiety and depression     Depression     Essential hypertension     Patient denies but on PCP note from 12/7/2023-

## 2024-10-22 NOTE — PROGRESS NOTES
Clinical Pharmacy Note    Isha Edward is a 39 y.o. female, referred to Clinical Pharmacy for medication management by Alessandra Bang MD. Isha Edward was seen today for diabetes management.     Typically around 120  159 this morning after snacking at night  Allergies   Allergen Reactions    Isoflurane Shortness Of Breath    Penicillins Anaphylaxis and Other (See Comments)    Sevoflurane Shortness Of Breath     General anest. \"Gas\"    Seasonal Itching        Past Medical History:   Diagnosis Date    Anesthesia complication     O2 stat dropped, bp dropped, trouble breathing, dizziness, N/V    Anxiety and depression     Depression     Essential hypertension     Patient denies but on PCP note from 2023- states Rx was for renal protection, and has never had HTN    Migraines     Mixed hyperlipidemia     Patient denies but on PCP note from 2023    Obesity     Pancreatitis, acute 2023    PONV (postoperative nausea and vomiting)     Seasonal allergies     Sleep apnea     uses cpap nightly    Type 2 diabetes mellitus without complication (HCC)     Under care of team 2024    PCP: Deng Deand, last visit 2023    Under care of team 2024    Pulmonary: Bryce Hitchcock MD, [facility], last visit 2023    Uses self-applied continuous glucose monitoring device     Vitamin D deficiency     Wears glasses       Social History     Tobacco Use    Smoking status: Former     Current packs/day: 0.00     Average packs/day: 0.5 packs/day for 10.0 years (5.0 ttl pk-yrs)     Types: Cigarettes     Start date:      Quit date:      Years since quittin.8    Smokeless tobacco: Never    Tobacco comments:     quit smoking 4 yrs ago   Substance Use Topics    Alcohol use: Yes     Comment: social     Family History   Problem Relation Age of Onset    Hypertension Mother     High Blood Pressure Mother     Diabetes type 2  Mother     Arrhythmia Mother     Hypertension Father     Heart Attack

## 2024-10-27 DIAGNOSIS — E11.9 CONTROLLED TYPE 2 DIABETES MELLITUS WITHOUT COMPLICATION, WITHOUT LONG-TERM CURRENT USE OF INSULIN (HCC): ICD-10-CM

## 2024-10-28 RX ORDER — PROCHLORPERAZINE 25 MG/1
SUPPOSITORY RECTAL
Qty: 3 EACH | Refills: 2 | Status: SHIPPED | OUTPATIENT
Start: 2024-10-28

## 2024-10-28 NOTE — TELEPHONE ENCOUNTER
Isha Edward is calling to request a refill on the following medication(s):    Last Visit Date (If Applicable):  10/22/2024    Next Visit Date:    11/25/2024    Medication Request:  Requested Prescriptions     Pending Prescriptions Disp Refills    Continuous Glucose Sensor (DEXCOM G6 SENSOR) MISC [Pharmacy Med Name: DEXCOM G6 SENSOR] 3 each 2     Sig: APPLY SENSOR TO SKIN FOR CONTINUOUS BLOOD SUGAR MONITORING, REPLACE EVERY 10 DAYS

## 2024-11-16 DIAGNOSIS — F33.42 RECURRENT MAJOR DEPRESSIVE DISORDER, IN FULL REMISSION (HCC): ICD-10-CM

## 2024-11-18 RX ORDER — BUPROPION HYDROCHLORIDE 300 MG/1
TABLET ORAL
Qty: 30 TABLET | Refills: 5 | Status: SHIPPED | OUTPATIENT
Start: 2024-11-18

## 2024-11-18 NOTE — TELEPHONE ENCOUNTER
Isha Edward is calling to request a refill on the following medication(s):    Last Visit Date (If Applicable):  10/22/2024    Next Visit Date:    11/25/2024    Medication Request:  Requested Prescriptions     Pending Prescriptions Disp Refills    buPROPion (WELLBUTRIN XL) 300 MG extended release tablet [Pharmacy Med Name: buPROPion HCL  MG TABLET] 30 tablet 5     Sig: TAKE 1 TABLET BY MOUTH DAILY

## 2024-11-21 DIAGNOSIS — Z79.4 TYPE 2 DIABETES MELLITUS WITHOUT COMPLICATION, WITH LONG-TERM CURRENT USE OF INSULIN (HCC): ICD-10-CM

## 2024-11-21 DIAGNOSIS — E11.9 TYPE 2 DIABETES MELLITUS WITHOUT COMPLICATION, WITH LONG-TERM CURRENT USE OF INSULIN (HCC): ICD-10-CM

## 2024-11-21 RX ORDER — SITAGLIPTIN 100 MG/1
100 TABLET, FILM COATED ORAL DAILY
Qty: 90 TABLET | Refills: 1 | Status: SHIPPED | OUTPATIENT
Start: 2024-11-21

## 2024-11-25 ENCOUNTER — OFFICE VISIT (OUTPATIENT)
Dept: FAMILY MEDICINE CLINIC | Age: 39
End: 2024-11-25

## 2024-11-25 VITALS
WEIGHT: 288 LBS | BODY MASS INDEX: 53 KG/M2 | OXYGEN SATURATION: 98 % | SYSTOLIC BLOOD PRESSURE: 114 MMHG | HEIGHT: 62 IN | TEMPERATURE: 98 F | DIASTOLIC BLOOD PRESSURE: 70 MMHG | HEART RATE: 94 BPM

## 2024-11-25 DIAGNOSIS — I10 ESSENTIAL HYPERTENSION: ICD-10-CM

## 2024-11-25 DIAGNOSIS — G47.33 OBSTRUCTIVE SLEEP APNEA SYNDROME: ICD-10-CM

## 2024-11-25 DIAGNOSIS — F34.1 DYSTHYMIA: ICD-10-CM

## 2024-11-25 DIAGNOSIS — K59.01 SLOW TRANSIT CONSTIPATION: ICD-10-CM

## 2024-11-25 DIAGNOSIS — Z79.4 CONTROLLED TYPE 2 DIABETES MELLITUS WITHOUT COMPLICATION, WITH LONG-TERM CURRENT USE OF INSULIN (HCC): Primary | ICD-10-CM

## 2024-11-25 DIAGNOSIS — Z23 NEED FOR INFLUENZA VACCINATION: ICD-10-CM

## 2024-11-25 DIAGNOSIS — E11.9 CONTROLLED TYPE 2 DIABETES MELLITUS WITHOUT COMPLICATION, WITH LONG-TERM CURRENT USE OF INSULIN (HCC): Primary | ICD-10-CM

## 2024-11-25 DIAGNOSIS — R74.8 ELEVATED LIPASE: ICD-10-CM

## 2024-11-25 DIAGNOSIS — E78.2 MIXED HYPERLIPIDEMIA: ICD-10-CM

## 2024-11-25 DIAGNOSIS — E66.01 MORBID OBESITY DUE TO EXCESS CALORIES: ICD-10-CM

## 2024-11-25 RX ORDER — PHENTERMINE HYDROCHLORIDE 37.5 MG/1
37.5 TABLET ORAL
Qty: 30 TABLET | Refills: 0 | Status: SHIPPED | OUTPATIENT
Start: 2024-11-25 | End: 2024-12-25

## 2024-11-25 RX ORDER — INSULIN DEGLUDEC 100 U/ML
20 INJECTION, SOLUTION SUBCUTANEOUS
Qty: 6 ML | Refills: 5
Start: 2024-11-25

## 2024-11-25 NOTE — PROGRESS NOTES
[facility]  [facility] MEDICINE  [address]  [address]  [facility] [address]  Dept: [phone]    11/25/2024    CHIEF COMPLAINT    Chief Complaint   Patient presents with    Weight Management       HPI    Isha Edward is a 39 y.o. female who presents   Chief Complaint   Patient presents with    Weight Management   .  HPI  History of Present Illness  The patient is a 39-year-old female who presents for a recheck of her weight and diabetes.    She is here for a routine medication checkup. She received a letter from her insurance company indicating that they will discontinue coverage for Januvia in 2025, but will cover saxagliptin. She is currently on atorvastatin 20 mg for cholesterol, bupropion 300 mg, Zyrtec, Jardiance, Flonase as needed, Januvia, lisinopril, metformin, naproxen, and Tresiba 20 units daily. Her A1c levels have been consistently below 6.7.    She had her gallbladder removed and has been taking Adipex for weight loss since 09/2024, which has resulted in a weight loss of over 20 pounds. She is unsure if the Adipex is causing her constipation, which has been severe enough to cause bleeding for about 4 days. She has a colonoscopy scheduled at [facility].    She has not been able to exercise much, but did a lot of walking during her vacation. She uses a CPAP machine, which she reports is working well for her. She is also taking vitamin D supplements.    Vitals:    11/25/24 0805   BP: 114/70   Site: Left Upper Arm   Position: Sitting   Cuff Size: Large Adult   Pulse: 94   Temp: 98 °F (36.7 °C)   TempSrc: Temporal   SpO2: 98%   Weight: 130.6 kg (288 lb)   Height: 1.575 m (5' 2\")       REVIEW OF SYSTEMS    Review of Systems    PAST MEDICAL HISTORY    Past Medical History:   Diagnosis Date    Anesthesia complication     O2 stat dropped, bp dropped, trouble breathing, dizziness, N/V    Anxiety and depression     Depression     Essential hypertension

## 2024-11-29 DIAGNOSIS — E78.2 MIXED HYPERLIPIDEMIA: ICD-10-CM

## 2024-11-29 DIAGNOSIS — E11.9 CONTROLLED TYPE 2 DIABETES MELLITUS WITHOUT COMPLICATION, WITHOUT LONG-TERM CURRENT USE OF INSULIN (HCC): ICD-10-CM

## 2024-11-29 DIAGNOSIS — E11.9 TYPE 2 DIABETES MELLITUS WITHOUT COMPLICATION, WITH LONG-TERM CURRENT USE OF INSULIN (HCC): ICD-10-CM

## 2024-11-29 DIAGNOSIS — E11.9 CONTROLLED TYPE 2 DIABETES MELLITUS WITHOUT COMPLICATION, WITH LONG-TERM CURRENT USE OF INSULIN (HCC): ICD-10-CM

## 2024-11-29 DIAGNOSIS — Z79.4 TYPE 2 DIABETES MELLITUS WITHOUT COMPLICATION, WITH LONG-TERM CURRENT USE OF INSULIN (HCC): ICD-10-CM

## 2024-11-29 DIAGNOSIS — Z79.4 CONTROLLED TYPE 2 DIABETES MELLITUS WITHOUT COMPLICATION, WITH LONG-TERM CURRENT USE OF INSULIN (HCC): ICD-10-CM

## 2024-11-29 NOTE — TELEPHONE ENCOUNTER
Isha Edward is calling to request a refill on the following medication(s):    Last Visit Date (If Applicable):  11/25/2024    Next Visit Date:    12/23/2024    Medication Request:  Requested Prescriptions     Pending Prescriptions Disp Refills    TRESIBA FLEXTOUCH 100 UNIT/ML SOPN 6 mL 5     Sig: Inject 20 Units into the skin daily (with breakfast)

## 2024-11-29 NOTE — TELEPHONE ENCOUNTER
Isha Edward is calling to request a refill on the following medication(s):    Last Visit Date (If Applicable):  11/25/2024    Next Visit Date:    12/23/2024    Medication Request:  Requested Prescriptions     Pending Prescriptions Disp Refills    atorvastatin (LIPITOR) 20 MG tablet [Pharmacy Med Name: ATORVASTATIN 20 MG TABLET] 90 tablet      Sig: TAKE 1 TABLET BY MOUTH DAILY    lisinopril (PRINIVIL;ZESTRIL) 5 MG tablet [Pharmacy Med Name: LISINOPRIL 5 MG TABLET] 90 tablet      Sig: TAKE 1 TABLET BY MOUTH DAILY

## 2024-12-02 DIAGNOSIS — E11.9 TYPE 2 DIABETES MELLITUS WITHOUT COMPLICATION, WITH LONG-TERM CURRENT USE OF INSULIN (HCC): ICD-10-CM

## 2024-12-02 DIAGNOSIS — Z79.4 TYPE 2 DIABETES MELLITUS WITHOUT COMPLICATION, WITH LONG-TERM CURRENT USE OF INSULIN (HCC): ICD-10-CM

## 2024-12-02 RX ORDER — ATORVASTATIN CALCIUM 20 MG/1
20 TABLET, FILM COATED ORAL DAILY
Qty: 90 TABLET | Refills: 3 | Status: SHIPPED | OUTPATIENT
Start: 2024-12-02

## 2024-12-02 RX ORDER — LISINOPRIL 5 MG/1
TABLET ORAL
Qty: 90 TABLET | Refills: 3 | Status: SHIPPED | OUTPATIENT
Start: 2024-12-02

## 2024-12-02 RX ORDER — INSULIN DEGLUDEC 100 U/ML
20 INJECTION, SOLUTION SUBCUTANEOUS
Qty: 6 ML | Refills: 5 | Status: SHIPPED | OUTPATIENT
Start: 2024-12-02

## 2024-12-02 NOTE — TELEPHONE ENCOUNTER
Isha Edward is calling to request a refill on the following medication(s):    Last Visit Date (If Applicable):  11/25/2024    Next Visit Date:    12/23/2024    Medication Request:  Requested Prescriptions     Pending Prescriptions Disp Refills    empagliflozin (JARDIANCE) 25 MG tablet 90 tablet 1     Sig: Take 1 tablet by mouth daily

## 2024-12-10 ENCOUNTER — ANESTHESIA (OUTPATIENT)
Dept: OPERATING ROOM | Age: 39
End: 2024-12-10
Payer: COMMERCIAL

## 2024-12-10 ENCOUNTER — HOSPITAL ENCOUNTER (OUTPATIENT)
Age: 39
Setting detail: OUTPATIENT SURGERY
Discharge: HOME | End: 2024-12-10
Attending: INTERNAL MEDICINE | Admitting: INTERNAL MEDICINE
Payer: COMMERCIAL

## 2024-12-10 ENCOUNTER — ANESTHESIA EVENT (OUTPATIENT)
Dept: OPERATING ROOM | Age: 39
End: 2024-12-10
Payer: COMMERCIAL

## 2024-12-10 VITALS
WEIGHT: 278 LBS | BODY MASS INDEX: 49.26 KG/M2 | DIASTOLIC BLOOD PRESSURE: 73 MMHG | RESPIRATION RATE: 23 BRPM | SYSTOLIC BLOOD PRESSURE: 116 MMHG | HEART RATE: 90 BPM | OXYGEN SATURATION: 100 % | TEMPERATURE: 98.6 F | HEIGHT: 63 IN

## 2024-12-10 DIAGNOSIS — K92.1 HEMATOCHEZIA: ICD-10-CM

## 2024-12-10 LAB
GLUCOSE BLD-MCNC: 117 MG/DL (ref 65–105)
HCG, PREGNANCY URINE (POC): NEGATIVE

## 2024-12-10 PROCEDURE — 3609010300 HC COLONOSCOPY W/BIOPSY SINGLE/MULTIPLE: Performed by: INTERNAL MEDICINE

## 2024-12-10 PROCEDURE — 3700000000 HC ANESTHESIA ATTENDED CARE: Performed by: INTERNAL MEDICINE

## 2024-12-10 PROCEDURE — 3700000001 HC ADD 15 MINUTES (ANESTHESIA): Performed by: INTERNAL MEDICINE

## 2024-12-10 PROCEDURE — 7100000011 HC PHASE II RECOVERY - ADDTL 15 MIN: Performed by: INTERNAL MEDICINE

## 2024-12-10 PROCEDURE — 88305 TISSUE EXAM BY PATHOLOGIST: CPT

## 2024-12-10 PROCEDURE — 6360000002 HC RX W HCPCS: Performed by: NURSE ANESTHETIST, CERTIFIED REGISTERED

## 2024-12-10 PROCEDURE — 2709999900 HC NON-CHARGEABLE SUPPLY: Performed by: INTERNAL MEDICINE

## 2024-12-10 PROCEDURE — 81025 URINE PREGNANCY TEST: CPT

## 2024-12-10 PROCEDURE — 7100000010 HC PHASE II RECOVERY - FIRST 15 MIN: Performed by: INTERNAL MEDICINE

## 2024-12-10 PROCEDURE — 2580000003 HC RX 258: Performed by: ANESTHESIOLOGY

## 2024-12-10 PROCEDURE — 6360000002 HC RX W HCPCS: Performed by: ANESTHESIOLOGY

## 2024-12-10 PROCEDURE — 82947 ASSAY GLUCOSE BLOOD QUANT: CPT

## 2024-12-10 RX ORDER — PROPOFOL 10 MG/ML
INJECTION, EMULSION INTRAVENOUS
Status: DISCONTINUED | OUTPATIENT
Start: 2024-12-10 | End: 2024-12-10 | Stop reason: SDUPTHER

## 2024-12-10 RX ORDER — ONDANSETRON 2 MG/ML
4 INJECTION INTRAMUSCULAR; INTRAVENOUS ONCE
Status: COMPLETED | OUTPATIENT
Start: 2024-12-10 | End: 2024-12-10

## 2024-12-10 RX ORDER — SODIUM CHLORIDE, SODIUM LACTATE, POTASSIUM CHLORIDE, CALCIUM CHLORIDE 600; 310; 30; 20 MG/100ML; MG/100ML; MG/100ML; MG/100ML
INJECTION, SOLUTION INTRAVENOUS CONTINUOUS
Status: DISCONTINUED | OUTPATIENT
Start: 2024-12-10 | End: 2024-12-10 | Stop reason: HOSPADM

## 2024-12-10 RX ORDER — SODIUM CHLORIDE 0.9 % (FLUSH) 0.9 %
5-40 SYRINGE (ML) INJECTION EVERY 12 HOURS SCHEDULED
Status: DISCONTINUED | OUTPATIENT
Start: 2024-12-10 | End: 2024-12-10 | Stop reason: HOSPADM

## 2024-12-10 RX ORDER — LIDOCAINE HYDROCHLORIDE 10 MG/ML
1 INJECTION, SOLUTION EPIDURAL; INFILTRATION; INTRACAUDAL; PERINEURAL
Status: DISCONTINUED | OUTPATIENT
Start: 2024-12-10 | End: 2024-12-10 | Stop reason: HOSPADM

## 2024-12-10 RX ORDER — SODIUM CHLORIDE 9 MG/ML
INJECTION, SOLUTION INTRAVENOUS CONTINUOUS
Status: DISCONTINUED | OUTPATIENT
Start: 2024-12-10 | End: 2024-12-10 | Stop reason: HOSPADM

## 2024-12-10 RX ORDER — SODIUM CHLORIDE 0.9 % (FLUSH) 0.9 %
5-40 SYRINGE (ML) INJECTION PRN
Status: DISCONTINUED | OUTPATIENT
Start: 2024-12-10 | End: 2024-12-10 | Stop reason: HOSPADM

## 2024-12-10 RX ORDER — SODIUM CHLORIDE 9 MG/ML
INJECTION, SOLUTION INTRAVENOUS PRN
Status: DISCONTINUED | OUTPATIENT
Start: 2024-12-10 | End: 2024-12-10 | Stop reason: HOSPADM

## 2024-12-10 RX ADMIN — SODIUM CHLORIDE: 9 INJECTION, SOLUTION INTRAVENOUS at 11:09

## 2024-12-10 RX ADMIN — ONDANSETRON 4 MG: 2 INJECTION INTRAMUSCULAR; INTRAVENOUS at 11:20

## 2024-12-10 RX ADMIN — PROPOFOL 350 MCG/KG/MIN: 10 INJECTION, EMULSION INTRAVENOUS at 11:47

## 2024-12-10 ASSESSMENT — PAIN - FUNCTIONAL ASSESSMENT
PAIN_FUNCTIONAL_ASSESSMENT: 0-10
PAIN_FUNCTIONAL_ASSESSMENT: NONE - DENIES PAIN
PAIN_FUNCTIONAL_ASSESSMENT: 0-10

## 2024-12-10 NOTE — ANESTHESIA PRE PROCEDURE
Obstructive sleep apnea syndrome G47.33    S/P laparoscopy Z98.890    Colitis K52.9    Lower abdominal pain R10.30    Nausea R11.0    Diarrhea R19.7    Elevated lipase R74.8    Pancreatitis, acute K85.90       Past Medical History:        Diagnosis Date    Anesthesia complication     O2 stat dropped, bp dropped, trouble breathing, dizziness, N/V    Anxiety and depression     Depression     Essential hypertension     Patient denies but on PCP note from 2023- states Rx was for renal protection, and has never had HTN    Migraines     Mixed hyperlipidemia     Patient denies but on PCP note from 2023    Obesity     Pancreatitis, acute 2023    PONV (postoperative nausea and vomiting)     Seasonal allergies     Sleep apnea     uses cpap nightly    Type 2 diabetes mellitus without complication (HCC)     Under care of team 2024    PCP: Trevin De, last visit 2023    Under care of team 2024    Pulmonary: Bryce Hitchcock MD, [facility], last visit 2023    Uses self-applied continuous glucose monitoring device     Vitamin D deficiency     Wears glasses        Past Surgical History:        Procedure Laterality Date     SECTION      CHOLECYSTECTOMY N/A 2024    CHOLECYSTECTOMY LAPAROSCOPIC ROBOTIC XI performed by Luis F Dowling IV, DO at [facility] OR    CHOLECYSTECTOMY, LAPAROSCOPIC  2024    ENDOSCOPY, COLON, DIAGNOSTIC      ESOPHAGOGASTRODUODENOSCOPY  2024    LAPAROSCOPY  2018    LAPAROSCOPY DIAGNOSTIC (N/A Abdomen), Lysis of adhesions    VA LAPS ABD PRTM&OMENTUM DX W/WO SPEC BR/WA SPX N/A 2018    LAPAROSCOPY DIAGNOSTIC performed by Alessia Montes MD at [facility] OR    UPPER GASTROINTESTINAL ENDOSCOPY N/A 2024    ENDOSCOPIC ULTRASOUND WITH PATHOLOGY performed by Kenji Truong MD at [facility] OR    UPPER GASTROINTESTINAL ENDOSCOPY  2024    EGD BIOPSY performed by Kenji Truong MD at [facility] OR    WISDOM TOOTH EXTRACTION

## 2024-12-10 NOTE — ANESTHESIA POSTPROCEDURE EVALUATION
Department of Anesthesiology  Postprocedure Note    Patient: Isha Edward  MRN: [id number]  YOB: 1985  Date of evaluation: 12/10/2024    Procedure Summary       Date: 12/10/24 Room / Location: [address]    Anesthesia Start: 1144 Anesthesia Stop: 1219    Procedure: COLONOSCOPY DIAGNOSTIC COLD FORCEPS POLYPECTOMY (Rectum) Diagnosis:       Hematochezia      (Hematochezia [K92.1])    Surgeons: Jonas Barber MD Responsible Provider: Keli Pierre MD    Anesthesia Type: MAC ASA Status: 3            Anesthesia Type: No value filed.    Kike Phase I: Kike Score: 10    Kike Phase II: Kike Score: 9    Anesthesia Post Evaluation    Patient location during evaluation: PACU  Patient participation: complete - patient participated  Level of consciousness: awake and alert  Airway patency: patent  Nausea & Vomiting: no nausea and no vomiting  Cardiovascular status: hemodynamically stable  Respiratory status: acceptable  Hydration status: euvolemic  Pain management: adequate    No notable events documented.

## 2024-12-10 NOTE — H&P
Interval H&P Note    Pt Name: Isha Edward  MRN: [id number]  YOB: 1985  Date of evaluation: 12/10/2024      [x] I have reviewed in EPIC the progress note by Dr. Bang dated 11/25/2024 for an Interval History and Physical note.     [x] I have examined  Isha Edward, a 39 y.o. female.There are no changes to the patient who is scheduled for COLONOSCOPY DIAGNOSTIC by Jonas Barber MD for Hematochezia. Patient has bowel changes. She reports she has been dealing with constipation for years, but it has worsened since beginning Adipex. Patient states she also noticed bright red blood when wiping approximately 3 weeks ago, which lasted for 3 to 4 days. Today, she reports nausea. Denies diarrhea, vomiting, abdominal pain, or unintentional weight loss. Patient followed bowel prep until watery clear. Has not had previous colonoscopy. No FH colon cancer or polyps. The patient denies new health changes, fever, chills, wheezing, cough, increased SOB, chest pain, open sores or wounds. Known diabetes, POC . Denies any current blood thinning medications.     Vital signs: /86   Pulse 96   Temp 98.1 °F (36.7 °C)   Resp 20   Ht 1.6 m (5' 3\")   Wt 126.1 kg (278 lb)   LMP 12/10/2024 Comment: URINE NEGATIVE  SpO2 98%   BMI 49.25 kg/m²     Allergies:  Isoflurane, Penicillins, Sevoflurane, and Seasonal    Medications:    Prior to Admission medications    Medication Sig Start Date End Date Taking? Authorizing Provider   atorvastatin (LIPITOR) 20 MG tablet TAKE 1 TABLET BY MOUTH DAILY 12/2/24  Yes Alessandra Bang MD   lisinopril (PRINIVIL;ZESTRIL) 5 MG tablet TAKE 1 TABLET BY MOUTH DAILY 12/2/24  Yes Alessandra Bang MD   TRESIBA FLEXTOUCH 100 UNIT/ML SOPN Inject 20 Units into the skin daily (with breakfast) 12/2/24  Yes Alessandra Bang MD   empagliflozin (JARDIANCE) 25 MG tablet Take 1 tablet by mouth daily 12/2/24  Yes Alessandra Bang MD   phentermine (ADIPEX-P) 37.5 MG tablet Take 1 tablet by

## 2024-12-10 NOTE — OP NOTE
[facility] Endoscopy  COLONOSCOPY    Patient: Isha Edward   Date:  12/10/2024   : 1985       Med Rec#: [id number]     Procedure:  Colonoscopy with cold forceps polypectomy  Indication: Abdominal pain, constipation    Findings   Hemorrhoids, internal  Diverticulosis, left sided, mild   Cecal polyp, resected with cold forceps.  Small and sessile  Normal terminal ileum  Suboptimal bowel prep in spite of 2-day bowel prep    Recommendations  Await pathology.  Repeat colonoscopy in 3 to 5  years.  2-day bowel prep    Appropriate Photos Taken: Yes  Specimen(s) Removed: As stated above  Previous Colonoscopy: None  Withdrawal Time: 11 minutes  Estimated Blood Loss: Minimal  Anesthesia:  MAC    Complications:    Sub-optimal bowel prep  Description of Procedure:  Informed consent was obtained after explanation of the procedure including indications, description of the procedure,  benefits and possible risks and complications of the procedure, and alternatives.  All questions were answered.  The patient's history was reviewed and a directed physical examination was performed prior to the procedure.  The patient was monitored throughout the procedure with pulse oximetry and periodic assessment of vital signs.  With the patient initially in the left lateral decubitus position, a digital rectal examination was performed.  The video endoscope was placed in the patient's rectum and advanced without difficulty  to the cecum, which was identified by the ileocecal valve and appendiceal orifice.  The prep was fair.   Examination of the mucosa and the anatomy was performed during both introduction and withdrawal of the endoscope.     Derick Barber M.D.  12/10/2024 at 11:20 AM

## 2024-12-10 NOTE — DISCHARGE INSTRUCTIONS
[facility] Endoscopy    POST-ENDOSCOPY INSTRUCTIONS    1. ACTIVITY     No driving, operating machinery, or making important decisions for 24 hours.      Resume normal activity after 24 hours.  You may return to work after 24 hours.    2. DIET      Resume your usual diet unless specified.                Diet Modification: none    3. MEDICATIONS       (Do not consume alcohol, tranquilizers, or sleeping medications for 24 hours unless advised by your physician)                 Resume your usual medications.    4. PHYSICIAN FOLLOW-UP                 Please call the office, at [phone], for:  pathology results / appointment / instructions.                  See your primary care physician as planned.      You will need your next colonoscopy in 3 to 5 years.      5. NORMAL CHANGES YOU MAY EXPERIENCE AFTER ENDOSCOPY:            COLONOSCOPY:  Passing of gas for several hours after. Bloating and mild abdominal cramping.          6. CALL YOUR PHYSICIAN IF YOU EXPERIENCE ANY OF THE FOLLOWING      A.  Passing blood rectally or vomiting blood (color may be red or black)      B.  Severe abdominal pain or tenderness (that is not relieved by passing air)      C.  Fever, chills, or excessive sweating      D.  Persistent nausea or vomiting      E.  Redness or swelling at the IV site    If you have additional questions, PLEASE call your doctor or the [facility] GI Unit ([phone])

## 2024-12-12 LAB — SURGICAL PATHOLOGY REPORT: NORMAL

## 2024-12-17 ENCOUNTER — PHARMACY VISIT (OUTPATIENT)
Dept: FAMILY MEDICINE CLINIC | Age: 39
End: 2024-12-17

## 2024-12-17 DIAGNOSIS — E11.9 CONTROLLED TYPE 2 DIABETES MELLITUS WITHOUT COMPLICATION, WITH LONG-TERM CURRENT USE OF INSULIN (HCC): Primary | ICD-10-CM

## 2024-12-17 DIAGNOSIS — Z79.4 CONTROLLED TYPE 2 DIABETES MELLITUS WITHOUT COMPLICATION, WITH LONG-TERM CURRENT USE OF INSULIN (HCC): Primary | ICD-10-CM

## 2024-12-17 RX ORDER — TIRZEPATIDE 2.5 MG/.5ML
2.5 INJECTION, SOLUTION SUBCUTANEOUS WEEKLY
Qty: 2 ML | Refills: 1 | Status: SHIPPED | OUTPATIENT
Start: 2024-12-17

## 2024-12-17 NOTE — PROGRESS NOTES
older than the maximum 180 days allowed.).  Lab Results   Component Value Date/Time    LABGLOM >90 04/20/2024 11:34 PM    LABGLOM >60 03/07/2024 09:15 AM    LABGLOM >60 11/07/2023 05:47 AM    LABGLOM >60 11/06/2023 01:45 PM     VITALS  Wt Readings from Last 3 Encounters:   12/10/24 126.1 kg (278 lb)   11/25/24 130.6 kg (288 lb)   10/22/24 129.3 kg (285 lb)     BP Readings from Last 3 Encounters:   12/10/24 116/73   11/25/24 114/70   10/22/24 118/80     Pulse Readings from Last 3 Encounters:   12/10/24 90   11/25/24 94   10/22/24 90     REVIEW OF SYSTEMS   Recent falls: No  Hyperglycemia: none   Hypoglycemia: none  Checking feet: Yes    NUTRITION  Breakfast: coffee with cream no sugar, jalapeno and egg burrito   Lunch: varies - leftovers from previous meals, takeout - [employer], [employer], or mexican  Dinner: all over the place, cooks a lot at home, spaghetti, burgers, shredded chicken, soups, chili - increasing vegetables at meals; subbing spaghetti squash for pasta  Snack(s): protein based snacks - protein bars, protein snacks (cheese, nuts, fruit [P3 type], chips (patients struggles with)  Beverage(s): coffee, water with liquid IV, soda twice a week regular (dr bonner) now drinking diet dr bonner  Alcohol Consumption? Yes - socially 3 times a month  Prior visit with dietician: yes - did not find beneficial     PHYSICAL ACTIVITY  Patient stated that she has not had much physical activity since her pancreatitis. Encouraged patient to attempt to do some physical activity. Patient got a new yoga mat for Jocelyn but has not opened yet - still on no activity after surgery    MEDICATION ADHERENCE ASSESSMENT  - Brought in home medications including OTCs: No  - Barriers to affording/accessing medications: No  - Uses pillbox/medication organizing method: Yes  - Adhering to current medication regimen: Yes    IMMUNIZATIONS  Immunization History   Administered Date(s) Administered    COVID-19, MODERNA BLUE border,

## 2024-12-23 ENCOUNTER — OFFICE VISIT (OUTPATIENT)
Dept: FAMILY MEDICINE CLINIC | Age: 39
End: 2024-12-23
Payer: COMMERCIAL

## 2024-12-23 VITALS
SYSTOLIC BLOOD PRESSURE: 106 MMHG | BODY MASS INDEX: 50.8 KG/M2 | HEART RATE: 87 BPM | DIASTOLIC BLOOD PRESSURE: 80 MMHG | WEIGHT: 286.8 LBS

## 2024-12-23 DIAGNOSIS — I10 ESSENTIAL HYPERTENSION: ICD-10-CM

## 2024-12-23 DIAGNOSIS — Z87.19 HX OF ACUTE PANCREATITIS: ICD-10-CM

## 2024-12-23 DIAGNOSIS — Z79.4 TYPE 2 DIABETES MELLITUS WITHOUT COMPLICATION, WITH LONG-TERM CURRENT USE OF INSULIN (HCC): Primary | ICD-10-CM

## 2024-12-23 DIAGNOSIS — E66.01 MORBID OBESITY WITH BMI OF 50.0-59.9, ADULT: ICD-10-CM

## 2024-12-23 DIAGNOSIS — F34.1 DYSTHYMIA: ICD-10-CM

## 2024-12-23 DIAGNOSIS — E11.9 TYPE 2 DIABETES MELLITUS WITHOUT COMPLICATION, WITH LONG-TERM CURRENT USE OF INSULIN (HCC): Primary | ICD-10-CM

## 2024-12-23 PROBLEM — F41.9 ANXIETY: Status: ACTIVE | Noted: 2024-12-23

## 2024-12-23 PROBLEM — J45.909 ASTHMATIC BRONCHITIS, UNSPECIFIED ASTHMA SEVERITY, UNCOMPLICATED: Status: ACTIVE | Noted: 2024-12-23

## 2024-12-23 PROCEDURE — 3074F SYST BP LT 130 MM HG: CPT | Performed by: FAMILY MEDICINE

## 2024-12-23 PROCEDURE — 99214 OFFICE O/P EST MOD 30 MIN: CPT | Performed by: FAMILY MEDICINE

## 2024-12-23 PROCEDURE — 3044F HG A1C LEVEL LT 7.0%: CPT | Performed by: FAMILY MEDICINE

## 2024-12-23 PROCEDURE — 3079F DIAST BP 80-89 MM HG: CPT | Performed by: FAMILY MEDICINE

## 2024-12-23 RX ORDER — TIRZEPATIDE 2.5 MG/.5ML
2.5 INJECTION, SOLUTION SUBCUTANEOUS WEEKLY
Qty: 2 ML | Refills: 1 | Status: SHIPPED
Start: 2024-12-23

## 2024-12-23 NOTE — PROGRESS NOTES
[address] [address]          [phone]      Leticia Slaughter  [address] [place] [id number]        September 3, 2020    NORMAL PAP SMEAR     I am glad to inform you that the results of your recent PAP Smear were reported    Normal. There was no evidence of cancer.     You are encouraged to repeat this exam on an annual basis or as otherwise   instructed.   Pap result is normal, repeat pap in 5 years. Annual yearly.         Mireya Henderson MD       
     CHOLECYSTECTOMY N/A 03/21/2024    CHOLECYSTECTOMY LAPAROSCOPIC ROBOTIC XI performed by Luis F Dowling IV, DO at [facility] OR    CHOLECYSTECTOMY, LAPAROSCOPIC  03/21/2024    COLONOSCOPY N/A 12/10/2024    COLONOSCOPY DIAGNOSTIC COLD FORCEPS POLYPECTOMY performed by Jonas Barber MD at [facility] OR    ENDOSCOPY, COLON, DIAGNOSTIC      ESOPHAGOGASTRODUODENOSCOPY  01/09/2024    LAPAROSCOPY  09/21/2018    LAPAROSCOPY DIAGNOSTIC (N/A Abdomen), Lysis of adhesions    AL LAPS ABD PRTM&OMENTUM DX W/WO SPEC BR/WA SPX N/A 09/21/2018    LAPAROSCOPY DIAGNOSTIC performed by Alessia Montes MD at [facility] OR    UPPER GASTROINTESTINAL ENDOSCOPY N/A 01/09/2024    ENDOSCOPIC ULTRASOUND WITH PATHOLOGY performed by Kenji Truong MD at [facility] OR    UPPER GASTROINTESTINAL ENDOSCOPY  01/09/2024    EGD BIOPSY performed by Kenji Truong MD at [facility] OR    WISDOM TOOTH EXTRACTION      WRIST GANGLION EXCISION      lt wrist age 7yrs       CURRENT MEDICATIONS    Current Outpatient Medications   Medication Sig Dispense Refill    Tirzepatide (MOUNJARO) 2.5 MG/0.5ML SOAJ Inject 2.5 mg into the skin once a week 2 mL 1    linaCLOtide (LINZESS) 72 MCG CAPS capsule Take 1 capsule by mouth every morning (before breakfast)      atorvastatin (LIPITOR) 20 MG tablet TAKE 1 TABLET BY MOUTH DAILY 90 tablet 3    lisinopril (PRINIVIL;ZESTRIL) 5 MG tablet TAKE 1 TABLET BY MOUTH DAILY 90 tablet 3    TRESIBA FLEXTOUCH 100 UNIT/ML SOPN Inject 20 Units into the skin daily (with breakfast) 6 mL 5    empagliflozin (JARDIANCE) 25 MG tablet Take 1 tablet by mouth daily 90 tablet 3    buPROPion (WELLBUTRIN XL) 300 MG extended release tablet TAKE 1 TABLET BY MOUTH DAILY 30 tablet 5    Continuous Glucose Sensor (DEXCOM G6 SENSOR) MISC APPLY SENSOR TO SKIN FOR CONTINUOUS BLOOD SUGAR MONITORING, REPLACE EVERY 10 DAYS 3 each 2    vitamin D (VITAMIN D3) 125 MCG (5000 UT) CAPS capsule Take 1 capsule by mouth daily 90 capsule 3    naproxen (NAPROSYN) 500

## 2025-01-14 ENCOUNTER — PHARMACY VISIT (OUTPATIENT)
Dept: FAMILY MEDICINE CLINIC | Age: 40
End: 2025-01-14

## 2025-01-14 VITALS
BODY MASS INDEX: 51.19 KG/M2 | HEART RATE: 84 BPM | WEIGHT: 289 LBS | DIASTOLIC BLOOD PRESSURE: 84 MMHG | SYSTOLIC BLOOD PRESSURE: 108 MMHG

## 2025-01-14 DIAGNOSIS — E11.9 TYPE 2 DIABETES MELLITUS WITHOUT COMPLICATION, WITH LONG-TERM CURRENT USE OF INSULIN (HCC): Primary | ICD-10-CM

## 2025-01-14 DIAGNOSIS — Z79.4 TYPE 2 DIABETES MELLITUS WITHOUT COMPLICATION, WITH LONG-TERM CURRENT USE OF INSULIN (HCC): Primary | ICD-10-CM

## 2025-01-14 NOTE — PROGRESS NOTES
Clinical Pharmacy Note    Isha Edward is a 39 y.o. female, referred to Clinical Pharmacy for medication management by Alessandra Bang MD. Isha Edward was seen today for diabetes management.     Allergies   Allergen Reactions    Isoflurane Shortness Of Breath    Penicillins Anaphylaxis and Other (See Comments)    Sevoflurane Shortness Of Breath     General anest. \"Gas\"    Seasonal Itching       Past Medical History:   Diagnosis Date    Anesthesia complication     O2 stat dropped, bp dropped, trouble breathing, dizziness, N/V    Anxiety and depression     Depression     Essential hypertension     Patient denies but on PCP note from 12/7/2023- states Rx was for renal protection, and has never had HTN    Migraines     Mixed hyperlipidemia     Patient denies but on PCP note from 12/7/2023    Obesity     Pancreatitis, acute 11/2023    PONV (postoperative nausea and vomiting)     Seasonal allergies     Sleep apnea     uses cpap nightly    Type 2 diabetes mellitus without complication (HCC)     Under care of team 01/05/2024    PCP: Trevin De, last visit 12/7/2023    Under care of team 01/05/2024    Pulmonary: Bryce Hitchcock MD, [facility], last visit 5/2023    Uses self-applied continuous glucose monitoring device     Vitamin D deficiency     Wears glasses       Social History     Tobacco Use    Smoking status: Former     Current packs/day: 0.00     Average packs/day: 0.5 packs/day for 10.0 years (5.0 ttl pk-yrs)     Types: Cigarettes     Start date: 2005     Quit date: 2015     Years since quitting: 10.0    Smokeless tobacco: Never    Tobacco comments:     quit smoking 4 yrs ago   Substance Use Topics    Alcohol use: Yes     Comment: social     Family History   Problem Relation Age of Onset    Hypertension Mother     High Blood Pressure Mother     Diabetes type 2  Mother     Arrhythmia Mother     Hypertension Father     Heart Attack Maternal Grandfather     High Blood Pressure Maternal

## 2025-02-06 DIAGNOSIS — E11.9 CONTROLLED TYPE 2 DIABETES MELLITUS WITHOUT COMPLICATION, WITHOUT LONG-TERM CURRENT USE OF INSULIN (HCC): ICD-10-CM

## 2025-02-07 RX ORDER — PROCHLORPERAZINE 25 MG/1
SUPPOSITORY RECTAL
Qty: 3 EACH | Refills: 2 | Status: SHIPPED | OUTPATIENT
Start: 2025-02-07

## 2025-02-11 ENCOUNTER — PHARMACY VISIT (OUTPATIENT)
Dept: FAMILY MEDICINE CLINIC | Age: 40
End: 2025-02-11

## 2025-02-11 VITALS — WEIGHT: 284 LBS | BODY MASS INDEX: 50.31 KG/M2

## 2025-02-11 DIAGNOSIS — E11.9 CONTROLLED TYPE 2 DIABETES MELLITUS WITHOUT COMPLICATION, WITHOUT LONG-TERM CURRENT USE OF INSULIN (HCC): Primary | ICD-10-CM

## 2025-02-11 DIAGNOSIS — Z79.4 TYPE 2 DIABETES MELLITUS WITHOUT COMPLICATION, WITH LONG-TERM CURRENT USE OF INSULIN (HCC): ICD-10-CM

## 2025-02-11 DIAGNOSIS — E11.9 TYPE 2 DIABETES MELLITUS WITHOUT COMPLICATION, WITH LONG-TERM CURRENT USE OF INSULIN (HCC): ICD-10-CM

## 2025-02-11 LAB — HBA1C MFR BLD: 6 %

## 2025-02-11 PROCEDURE — APPNB30 APP NON BILLABLE TIME 0-30 MINS

## 2025-02-11 PROCEDURE — 83036 HEMOGLOBIN GLYCOSYLATED A1C: CPT | Performed by: PHARMACIST

## 2025-02-11 RX ORDER — ACYCLOVIR 400 MG/1
TABLET ORAL
Qty: 1 EACH | Refills: 0 | Status: SHIPPED | OUTPATIENT
Start: 2025-02-11

## 2025-02-11 RX ORDER — ACYCLOVIR 400 MG/1
TABLET ORAL
Qty: 9 EACH | Refills: 3 | Status: SHIPPED | OUTPATIENT
Start: 2025-02-11

## 2025-02-11 RX ORDER — METFORMIN HYDROCHLORIDE 500 MG/1
TABLET, EXTENDED RELEASE ORAL
Qty: 270 TABLET | Refills: 3 | Status: SHIPPED | OUTPATIENT
Start: 2025-02-11

## 2025-02-13 NOTE — PROGRESS NOTES
I have discussed the care of this patient with the resident and I agree with the above as documented.     Henri Fernandes, CristianD, [facility]  Ambulatory Clinical Pharmacist   [facility] Specialty Medication Service  Phone: [phone]  [facility]  Phone: [phone] option 1    
Value Date    LABA1C 6.0 10/22/2024    LABA1C 6.5 06/27/2024    LABA1C 7.1 03/07/2024     RENAL MONITORING  No results found for: \"MALBCR\"  Lab Results   Component Value Date/Time     04/20/2024 11:34 PM    K 3.8 04/20/2024 11:34 PM    CREATININE 0.8 04/20/2024 11:34 PM    LABGLOM >90 04/20/2024 11:34 PM     CrCl cannot be calculated (Patient's most recent lab result is older than the maximum 180 days allowed.).    CHOLESTEROL MANAGEMENT  Lab Results   Component Value Date    CHOL 155 12/07/2023    TRIG 147 12/07/2023    HDL 52 12/07/2023    LDL 74 12/07/2023     ALT   Date Value Ref Range Status   04/20/2024 24 5 - 33 U/L Final     AST   Date Value Ref Range Status   04/20/2024 14 <32 U/L Final     BLOOD PRESSURE MANAGEMENT  BP Readings from Last 3 Encounters:   01/14/25 108/84   12/23/24 106/80   12/10/24 116/73     RISK MANAGEMENT  ASCVD RISK: The ASCVD Risk score (Marysol LAWLER, et al., 2019) failed to calculate for the following reasons:    The 2019 ASCVD risk score is only valid for ages 40 to 79; LDL-C target < 100 mg/dL  On Statin: Yes  On ACE-I/ARB for HTN or Microalbuminuria: Yes  On GLP-1RA:Yes  On SGLT2i: Yes  Smoker: former smoker, quit 10 years ago  Immunizations Needed: Tdap   Date of last eye exam: Need to assess  Date of last foot exam: Need to assess  Date of last dental exam: Need to assess  Assessment/Plan   Diabetes Management: Controlled with most recent A1C of 6.0% (02/11/2025) at target of < 7%. Currently uses Dexcom G6; glucose in the AM has been running in the 120s-150s. Denies true hypoglycemia (one overnight reading in 60s w/o sx; did not tx or check w/ finger poke). She expresses interest in switching to Dexcom G7 from G6. Currently on metformin, Tresiba, Mounjaro, and Jardiance. Patient tolerating previous increase in Mounjaro w/ occasional abdominal pain upon standing; reports much better than when previously on Mounjaro; does not impede ADLs; she is aware of pancreatitis risks of

## 2025-02-14 DIAGNOSIS — Z79.4 TYPE 2 DIABETES MELLITUS WITHOUT COMPLICATION, WITH LONG-TERM CURRENT USE OF INSULIN (HCC): ICD-10-CM

## 2025-02-14 DIAGNOSIS — E11.9 TYPE 2 DIABETES MELLITUS WITHOUT COMPLICATION, WITH LONG-TERM CURRENT USE OF INSULIN (HCC): ICD-10-CM

## 2025-02-14 RX ORDER — TIRZEPATIDE 5 MG/.5ML
INJECTION, SOLUTION SUBCUTANEOUS
Qty: 2 ML | Refills: 3 | Status: SHIPPED | OUTPATIENT
Start: 2025-02-14

## 2025-02-21 ENCOUNTER — TELEPHONE (OUTPATIENT)
Dept: FAMILY MEDICINE CLINIC | Age: 40
End: 2025-02-21

## 2025-02-21 DIAGNOSIS — E11.9 CONTROLLED TYPE 2 DIABETES MELLITUS WITHOUT COMPLICATION, WITHOUT LONG-TERM CURRENT USE OF INSULIN (HCC): ICD-10-CM

## 2025-02-21 RX ORDER — ACYCLOVIR 400 MG/1
TABLET ORAL
Qty: 1 EACH | Refills: 0 | Status: SHIPPED | OUTPATIENT
Start: 2025-02-21

## 2025-02-24 NOTE — TELEPHONE ENCOUNTER
Medication Management Service    Date: 2/24/2025  Patient's Name: Isha Edward YOB: 1985            _____________________________________________________________________________________________    Patient called stating that the Dexcom G7  was not received at pharmacy. New prescription sent in.    Henri Fernandes PharmD, [facility]  Ambulatory Clinical Pharmacist   [facility] Specialty Medication Service  Phone: [phone]  [facility] Medicine  Phone: [phone] option 1    For Pharmacy Admin Tracking Only    Program: Medical Group  CPA in place:  Yes  Recommendation Provided To: Patient/Caregiver: 1 via Telephone  Intervention Detail: Refill(s) Provided  Intervention Accepted By: Patient/Caregiver: 1  Gap Closed?: Yes   Time Spent (min): 10

## 2025-03-04 ENCOUNTER — OFFICE VISIT (OUTPATIENT)
Dept: FAMILY MEDICINE CLINIC | Age: 40
End: 2025-03-04
Payer: COMMERCIAL

## 2025-03-04 ENCOUNTER — HOSPITAL ENCOUNTER (OUTPATIENT)
Age: 40
Setting detail: SPECIMEN
Discharge: HOME | End: 2025-03-04

## 2025-03-04 VITALS
BODY MASS INDEX: 49.22 KG/M2 | WEIGHT: 277.8 LBS | DIASTOLIC BLOOD PRESSURE: 76 MMHG | HEART RATE: 90 BPM | HEIGHT: 63 IN | OXYGEN SATURATION: 98 % | SYSTOLIC BLOOD PRESSURE: 114 MMHG

## 2025-03-04 DIAGNOSIS — F34.1 DYSTHYMIA: ICD-10-CM

## 2025-03-04 DIAGNOSIS — I10 ESSENTIAL HYPERTENSION: ICD-10-CM

## 2025-03-04 DIAGNOSIS — E55.9 VITAMIN D DEFICIENCY: ICD-10-CM

## 2025-03-04 DIAGNOSIS — E11.9 TYPE 2 DIABETES MELLITUS WITHOUT COMPLICATION, WITH LONG-TERM CURRENT USE OF INSULIN (HCC): Primary | ICD-10-CM

## 2025-03-04 DIAGNOSIS — E78.2 MIXED HYPERLIPIDEMIA: ICD-10-CM

## 2025-03-04 DIAGNOSIS — Z79.4 TYPE 2 DIABETES MELLITUS WITHOUT COMPLICATION, WITH LONG-TERM CURRENT USE OF INSULIN (HCC): Primary | ICD-10-CM

## 2025-03-04 LAB
25(OH)D3 SERPL-MCNC: 69.9 NG/ML (ref 30–100)
ALBUMIN SERPL-MCNC: 4.4 G/DL (ref 3.5–5.2)
ALBUMIN/GLOB SERPL: 1.7 {RATIO} (ref 1–2.5)
ALP SERPL-CCNC: 62 U/L (ref 35–104)
ALT SERPL-CCNC: 44 U/L (ref 10–35)
ANION GAP SERPL CALCULATED.3IONS-SCNC: 12 MMOL/L (ref 9–16)
AST SERPL-CCNC: 19 U/L (ref 10–35)
BILIRUB SERPL-MCNC: 0.6 MG/DL (ref 0–1.2)
BUN SERPL-MCNC: 24 MG/DL (ref 6–20)
CALCIUM SERPL-MCNC: 9.6 MG/DL (ref 8.6–10.4)
CHLORIDE SERPL-SCNC: 103 MMOL/L (ref 98–107)
CHOLEST SERPL-MCNC: 138 MG/DL (ref 0–199)
CHOLESTEROL/HDL RATIO: 2.9
CO2 SERPL-SCNC: 25 MMOL/L (ref 20–31)
CREAT SERPL-MCNC: 0.9 MG/DL (ref 0.6–0.9)
GFR, ESTIMATED: 84 ML/MIN/1.73M2
GLUCOSE SERPL-MCNC: 135 MG/DL (ref 74–99)
HDLC SERPL-MCNC: 47 MG/DL
LDLC SERPL CALC-MCNC: 54 MG/DL (ref 0–100)
POTASSIUM SERPL-SCNC: 4.4 MMOL/L (ref 3.7–5.3)
PROT SERPL-MCNC: 7 G/DL (ref 6.6–8.7)
SODIUM SERPL-SCNC: 140 MMOL/L (ref 136–145)
TRIGL SERPL-MCNC: 185 MG/DL
VLDLC SERPL CALC-MCNC: 37 MG/DL (ref 1–30)

## 2025-03-04 PROCEDURE — 3074F SYST BP LT 130 MM HG: CPT | Performed by: FAMILY MEDICINE

## 2025-03-04 PROCEDURE — 99214 OFFICE O/P EST MOD 30 MIN: CPT | Performed by: FAMILY MEDICINE

## 2025-03-04 PROCEDURE — 3078F DIAST BP <80 MM HG: CPT | Performed by: FAMILY MEDICINE

## 2025-03-04 PROCEDURE — 3044F HG A1C LEVEL LT 7.0%: CPT | Performed by: FAMILY MEDICINE

## 2025-03-04 SDOH — ECONOMIC STABILITY: FOOD INSECURITY: WITHIN THE PAST 12 MONTHS, THE FOOD YOU BOUGHT JUST DIDN'T LAST AND YOU DIDN'T HAVE MONEY TO GET MORE.: NEVER TRUE

## 2025-03-04 SDOH — ECONOMIC STABILITY: FOOD INSECURITY: WITHIN THE PAST 12 MONTHS, YOU WORRIED THAT YOUR FOOD WOULD RUN OUT BEFORE YOU GOT MONEY TO BUY MORE.: NEVER TRUE

## 2025-03-04 ASSESSMENT — PATIENT HEALTH QUESTIONNAIRE - PHQ9
SUM OF ALL RESPONSES TO PHQ QUESTIONS 1-9: 0
9. THOUGHTS THAT YOU WOULD BE BETTER OFF DEAD, OR OF HURTING YOURSELF: NOT AT ALL
6. FEELING BAD ABOUT YOURSELF - OR THAT YOU ARE A FAILURE OR HAVE LET YOURSELF OR YOUR FAMILY DOWN: NOT AT ALL
4. FEELING TIRED OR HAVING LITTLE ENERGY: NOT AT ALL
5. POOR APPETITE OR OVEREATING: NOT AT ALL
1. LITTLE INTEREST OR PLEASURE IN DOING THINGS: NOT AT ALL
8. MOVING OR SPEAKING SO SLOWLY THAT OTHER PEOPLE COULD HAVE NOTICED. OR THE OPPOSITE, BEING SO FIGETY OR RESTLESS THAT YOU HAVE BEEN MOVING AROUND A LOT MORE THAN USUAL: NOT AT ALL
3. TROUBLE FALLING OR STAYING ASLEEP: NOT AT ALL
10. IF YOU CHECKED OFF ANY PROBLEMS, HOW DIFFICULT HAVE THESE PROBLEMS MADE IT FOR YOU TO DO YOUR WORK, TAKE CARE OF THINGS AT HOME, OR GET ALONG WITH OTHER PEOPLE: NOT DIFFICULT AT ALL
SUM OF ALL RESPONSES TO PHQ QUESTIONS 1-9: 0
SUM OF ALL RESPONSES TO PHQ QUESTIONS 1-9: 0
2. FEELING DOWN, DEPRESSED OR HOPELESS: NOT AT ALL
7. TROUBLE CONCENTRATING ON THINGS, SUCH AS READING THE NEWSPAPER OR WATCHING TELEVISION: NOT AT ALL
SUM OF ALL RESPONSES TO PHQ QUESTIONS 1-9: 0

## 2025-03-04 NOTE — PATIENT INSTRUCTIONS
Thank you for choosing [facility].  We know you have options when it comes to your healthcare; we appreciate that you chose us. Our goal is to provide exceptional  service and world class care to every patient.  You will be receiving a survey via email or text message asking for your feedback.  Please take a few minutes to share your thoughts about your recent visit. Your comments helps us understand what we do well and ways we can improve.  Thank you in advance for your valuable feedback.

## 2025-03-04 NOTE — PROGRESS NOTES
[facility]  [facility] MEDICINE  [address]  [address]  [facility] [address]  Dept: [phone]    3/4/2025    CHIEF COMPLAINT    Chief Complaint   Patient presents with    Hypertension    Diabetes       HPI    Isha Edward is a 39 y.o. female who presents   Chief Complaint   Patient presents with    Hypertension    Diabetes   .  HPI  History of Present Illness  The patient is a 39-year-old female who presents for a recheck of diabetes and hypertension.    She reports no significant health issues, with the exception of an elevated blood glucose level of 172 this morning, which she attributes to consuming ice cream the previous night. She has been experiencing weight loss, which she did not anticipate. Her appetite has decreased, leading to reduced food intake since taking Mounjaro, currently at 5 mg weekly. Occasionally, she experiences hypoglycemia, typically in response to the application of a new monitor, but her blood glucose levels normalize by the following day. She has discontinued insulin therapy and increased her metformin dosage to maintain glycemic control. She was previously on a regimen of 5 units of Tresiba. She is under the care of Dr. Álvarez, clinical pharmacist, for her diabetes management. She does not engage in regular physical exercise. Her current medication regimen includes Jardiance and metformin (2 tablets in the morning and 1 in the evening), although she occasionally takes all 3 tablets in the morning due to her busy schedule.    She is currently taking lisinopril 5 mg for hypertension. Her blood pressure was reported to be lower than usual during this visit, which she found surprising given her perceived stress levels.    She is due for a tetanus booster, as it has been 10 years since her last one. She has not completed her cholesterol panel, which was ordered in November 2024. She is also taking vitamin D supplements.    She is seeing

## 2025-03-10 NOTE — PROGRESS NOTES
Clinical Pharmacy Note    Isha Edward is a 39 y.o. female, referred to Clinical Pharmacy for medication management by Alessandra Bang MD. Isha Edward was seen today for diabetes management.     Allergies   Allergen Reactions    Isoflurane Shortness Of Breath    Penicillins Anaphylaxis and Other (See Comments)    Sevoflurane Shortness Of Breath     General anest. \"Gas\"    Seasonal Itching        Past Medical History:   Diagnosis Date    Anesthesia complication     O2 stat dropped, bp dropped, trouble breathing, dizziness, N/V    Anxiety and depression     Depression     Essential hypertension     Patient denies but on PCP note from 12/7/2023- states Rx was for renal protection, and has never had HTN    Migraines     Mixed hyperlipidemia     Patient denies but on PCP note from 12/7/2023    Obesity     Pancreatitis, acute 11/2023    PONV (postoperative nausea and vomiting)     Seasonal allergies     Sleep apnea     uses cpap nightly    Type 2 diabetes mellitus without complication (HCC)     Under care of team 01/05/2024    PCP: Trevin De, last visit 12/7/2023    Under care of team 01/05/2024    Pulmonary: Bryce Hitchcock MD, [facility], last visit 5/2023    Uses self-applied continuous glucose monitoring device     Vitamin D deficiency     Wears glasses       Social History     Tobacco Use    Smoking status: Former     Current packs/day: 0.00     Average packs/day: 0.5 packs/day for 10.0 years (5.0 ttl pk-yrs)     Types: Cigarettes     Start date: 2005     Quit date: 2015     Years since quitting: 10.1    Smokeless tobacco: Never    Tobacco comments:     quit smoking 4 yrs ago   Substance Use Topics    Alcohol use: Yes     Comment: social     Family History   Problem Relation Age of Onset    Hypertension Mother     High Blood Pressure Mother     Diabetes type 2  Mother     Arrhythmia Mother     Hypertension Father     Heart Attack Maternal Grandfather     High Blood Pressure Maternal

## 2025-03-11 ENCOUNTER — PHARMACY VISIT (OUTPATIENT)
Dept: FAMILY MEDICINE CLINIC | Age: 40
End: 2025-03-11

## 2025-03-11 DIAGNOSIS — E11.9 TYPE 2 DIABETES MELLITUS WITHOUT COMPLICATION, WITH LONG-TERM CURRENT USE OF INSULIN (HCC): Primary | ICD-10-CM

## 2025-03-11 DIAGNOSIS — Z79.4 TYPE 2 DIABETES MELLITUS WITHOUT COMPLICATION, WITH LONG-TERM CURRENT USE OF INSULIN (HCC): Primary | ICD-10-CM

## 2025-06-02 DIAGNOSIS — Z79.4 TYPE 2 DIABETES MELLITUS WITHOUT COMPLICATION, WITH LONG-TERM CURRENT USE OF INSULIN (HCC): ICD-10-CM

## 2025-06-02 DIAGNOSIS — E11.9 TYPE 2 DIABETES MELLITUS WITHOUT COMPLICATION, WITH LONG-TERM CURRENT USE OF INSULIN (HCC): ICD-10-CM

## 2025-06-02 RX ORDER — EMPAGLIFLOZIN 25 MG/1
25 TABLET, FILM COATED ORAL DAILY
Qty: 90 TABLET | Refills: 3 | Status: SHIPPED | OUTPATIENT
Start: 2025-06-02

## 2025-06-03 ENCOUNTER — PHARMACY VISIT (OUTPATIENT)
Dept: FAMILY MEDICINE CLINIC | Age: 40
End: 2025-06-03

## 2025-06-03 ENCOUNTER — OFFICE VISIT (OUTPATIENT)
Dept: FAMILY MEDICINE CLINIC | Age: 40
End: 2025-06-03
Payer: COMMERCIAL

## 2025-06-03 ENCOUNTER — HOSPITAL ENCOUNTER (OUTPATIENT)
Age: 40
Setting detail: SPECIMEN
Discharge: HOME | End: 2025-06-03

## 2025-06-03 VITALS
SYSTOLIC BLOOD PRESSURE: 112 MMHG | DIASTOLIC BLOOD PRESSURE: 70 MMHG | HEART RATE: 97 BPM | WEIGHT: 272.6 LBS | BODY MASS INDEX: 48.29 KG/M2

## 2025-06-03 DIAGNOSIS — M79.10 MUSCLE PAIN: ICD-10-CM

## 2025-06-03 DIAGNOSIS — M79.641 BILATERAL HAND PAIN: ICD-10-CM

## 2025-06-03 DIAGNOSIS — E78.2 MIXED HYPERLIPIDEMIA: ICD-10-CM

## 2025-06-03 DIAGNOSIS — M79.10 MUSCLE PAIN: Primary | ICD-10-CM

## 2025-06-03 DIAGNOSIS — E11.9 TYPE 2 DIABETES MELLITUS WITHOUT COMPLICATION, WITH LONG-TERM CURRENT USE OF INSULIN (HCC): Primary | ICD-10-CM

## 2025-06-03 DIAGNOSIS — R74.8 ELEVATED LIVER ENZYMES: ICD-10-CM

## 2025-06-03 DIAGNOSIS — M79.642 BILATERAL HAND PAIN: ICD-10-CM

## 2025-06-03 DIAGNOSIS — Z79.4 TYPE 2 DIABETES MELLITUS WITHOUT COMPLICATION, WITH LONG-TERM CURRENT USE OF INSULIN (HCC): Primary | ICD-10-CM

## 2025-06-03 DIAGNOSIS — E55.9 VITAMIN D DEFICIENCY: ICD-10-CM

## 2025-06-03 DIAGNOSIS — E11.9 TYPE 2 DIABETES MELLITUS WITHOUT COMPLICATION, WITH LONG-TERM CURRENT USE OF INSULIN (HCC): ICD-10-CM

## 2025-06-03 DIAGNOSIS — Z79.4 TYPE 2 DIABETES MELLITUS WITHOUT COMPLICATION, WITH LONG-TERM CURRENT USE OF INSULIN (HCC): ICD-10-CM

## 2025-06-03 DIAGNOSIS — I10 ESSENTIAL HYPERTENSION: ICD-10-CM

## 2025-06-03 LAB
ALBUMIN SERPL-MCNC: 4.4 G/DL (ref 3.5–5.2)
ALBUMIN/GLOB SERPL: 1.7 {RATIO} (ref 1–2.5)
ALP SERPL-CCNC: 56 U/L (ref 35–104)
ALT SERPL-CCNC: 38 U/L (ref 10–35)
ANION GAP SERPL CALCULATED.3IONS-SCNC: 12 MMOL/L (ref 9–16)
AST SERPL-CCNC: 23 U/L (ref 10–35)
BILIRUB SERPL-MCNC: 0.4 MG/DL (ref 0–1.2)
BUN SERPL-MCNC: 18 MG/DL (ref 6–20)
CALCIUM SERPL-MCNC: 9.3 MG/DL (ref 8.6–10.4)
CHLORIDE SERPL-SCNC: 107 MMOL/L (ref 98–107)
CK SERPL-CCNC: 137 U/L (ref 26–192)
CO2 SERPL-SCNC: 22 MMOL/L (ref 20–31)
CREAT SERPL-MCNC: 0.9 MG/DL (ref 0.6–0.9)
CREAT UR-MCNC: 119 MG/DL (ref 28–217)
CRP SERPL HS-MCNC: <3 MG/L (ref 0–5)
ERYTHROCYTE [SEDIMENTATION RATE] IN BLOOD BY PHOTOMETRIC METHOD: 21 MM/HR (ref 0–20)
GFR, ESTIMATED: 84 ML/MIN/1.73M2
GLUCOSE SERPL-MCNC: 98 MG/DL (ref 74–99)
MICROALBUMIN UR-MCNC: <12 MG/L (ref 0–20)
MICROALBUMIN/CREAT UR-RTO: NORMAL MCG/MG CREAT (ref 0–25)
POTASSIUM SERPL-SCNC: 4.5 MMOL/L (ref 3.7–5.3)
PROT SERPL-MCNC: 7 G/DL (ref 6.6–8.7)
SODIUM SERPL-SCNC: 141 MMOL/L (ref 136–145)

## 2025-06-03 PROCEDURE — 99214 OFFICE O/P EST MOD 30 MIN: CPT | Performed by: FAMILY MEDICINE

## 2025-06-03 PROCEDURE — 3074F SYST BP LT 130 MM HG: CPT | Performed by: FAMILY MEDICINE

## 2025-06-03 PROCEDURE — 3044F HG A1C LEVEL LT 7.0%: CPT | Performed by: FAMILY MEDICINE

## 2025-06-03 PROCEDURE — 3078F DIAST BP <80 MM HG: CPT | Performed by: FAMILY MEDICINE

## 2025-06-03 RX ORDER — NAPROXEN 500 MG/1
500 TABLET ORAL
Qty: 60 TABLET | Refills: 5 | Status: SHIPPED
Start: 2025-06-03

## 2025-06-03 ASSESSMENT — PATIENT HEALTH QUESTIONNAIRE - PHQ9
SUM OF ALL RESPONSES TO PHQ QUESTIONS 1-9: 0
SUM OF ALL RESPONSES TO PHQ QUESTIONS 1-9: 0
2. FEELING DOWN, DEPRESSED OR HOPELESS: NOT AT ALL
SUM OF ALL RESPONSES TO PHQ QUESTIONS 1-9: 0
SUM OF ALL RESPONSES TO PHQ QUESTIONS 1-9: 0
1. LITTLE INTEREST OR PLEASURE IN DOING THINGS: NOT AT ALL

## 2025-06-03 NOTE — PROGRESS NOTES
Clinical Pharmacy Note    Isha Edward is a 39 y.o. female, referred to Clinical Pharmacy for medication management by Alessandra Bang MD. Isha Edward was seen today for diabetes management.     Allergies   Allergen Reactions    Isoflurane Shortness Of Breath    Penicillins Anaphylaxis and Other (See Comments)    Sevoflurane Shortness Of Breath     General anest. \"Gas\"    Environmental/Seasonal Itching        Past Medical History:   Diagnosis Date    Anesthesia complication     O2 stat dropped, bp dropped, trouble breathing, dizziness, N/V    Anxiety and depression     Depression     Essential hypertension     Patient denies but on PCP note from 12/7/2023- states Rx was for renal protection, and has never had HTN    Migraines     Mixed hyperlipidemia     Patient denies but on PCP note from 12/7/2023    Obesity     Pancreatitis, acute 11/2023    PONV (postoperative nausea and vomiting)     Seasonal allergies     Sleep apnea     uses cpap nightly    Type 2 diabetes mellitus without complication (HCC)     Under care of team 01/05/2024    PCP: Trevin De, last visit 12/7/2023    Under care of team 01/05/2024    Pulmonary: Bryce Hitchcock MD, [facility], last visit 5/2023    Uses self-applied continuous glucose monitoring device     Vitamin D deficiency     Wears glasses       Social History     Tobacco Use    Smoking status: Former     Current packs/day: 0.00     Average packs/day: 0.5 packs/day for 10.0 years (5.0 ttl pk-yrs)     Types: Cigarettes     Start date: 2005     Quit date: 2015     Years since quitting: 10.4    Smokeless tobacco: Never    Tobacco comments:     quit smoking 4 yrs ago   Substance Use Topics    Alcohol use: Yes     Comment: social     Family History   Problem Relation Age of Onset    Hypertension Mother     High Blood Pressure Mother     Diabetes type 2  Mother     Arrhythmia Mother     Hypertension Father     Heart Attack Maternal Grandfather     High Blood Pressure

## 2025-06-03 NOTE — PROGRESS NOTES
112/70   Pulse: 97   Weight: 123.7 kg (272 lb 9.6 oz)       REVIEW OF SYSTEMS    Review of Systems    PAST MEDICAL HISTORY    Past Medical History:   Diagnosis Date    Anesthesia complication     O2 stat dropped, bp dropped, trouble breathing, dizziness, N/V    Anxiety and depression     Depression     Essential hypertension     Patient denies but on PCP note from 12/7/2023- states Rx was for renal protection, and has never had HTN    Migraines     Mixed hyperlipidemia     Patient denies but on PCP note from 12/7/2023    Obesity     Pancreatitis, acute 11/2023    PONV (postoperative nausea and vomiting)     Seasonal allergies     Sleep apnea     uses cpap nightly    Type 2 diabetes mellitus without complication (HCC)     Under care of team 01/05/2024    PCP: Trevin De, last visit 12/7/2023    Under care of team 01/05/2024    Pulmonary: Bryce Hitchcock MD, [facility], last visit 5/2023    Uses self-applied continuous glucose monitoring device     Vitamin D deficiency     Wears glasses        FAMILY HISTORY    Family History   Problem Relation Age of Onset    Hypertension Mother     High Blood Pressure Mother     Diabetes type 2  Mother     Arrhythmia Mother     Hypertension Father     Heart Attack Maternal Grandfather     High Blood Pressure Maternal Grandfather     Stroke Maternal Grandfather     Cancer Maternal Aunt     Breast Cancer Maternal Aunt     Breast Cancer Paternal Aunt     Diabetes Paternal Uncle        SOCIAL HISTORY    Social History     Socioeconomic History    Marital status:      Spouse name: None    Number of children: 2    Years of education: None    Highest education level: None   Occupational History    Occupation:      Employer: [employer]   Tobacco Use    Smoking status: Former     Current packs/day: 0.00     Average packs/day: 0.5 packs/day for 10.0 years (5.0 ttl pk-yrs)     Types: Cigarettes     Start date: 2005     Quit date: 2015     Years since

## 2025-06-04 LAB
ANA SER QL IA: NEGATIVE
DSDNA IGG SER QL IA: 1.1 IU/ML
NUCLEAR IGG SER IA-RTO: <0.1 U/ML

## 2025-06-05 ENCOUNTER — RESULTS FOLLOW-UP (OUTPATIENT)
Dept: FAMILY MEDICINE CLINIC | Age: 40
End: 2025-06-05

## 2025-06-06 DIAGNOSIS — E11.9 TYPE 2 DIABETES MELLITUS WITHOUT COMPLICATION, WITH LONG-TERM CURRENT USE OF INSULIN (HCC): ICD-10-CM

## 2025-06-06 DIAGNOSIS — Z79.4 TYPE 2 DIABETES MELLITUS WITHOUT COMPLICATION, WITH LONG-TERM CURRENT USE OF INSULIN (HCC): ICD-10-CM

## 2025-06-06 DIAGNOSIS — F33.42 RECURRENT MAJOR DEPRESSIVE DISORDER, IN FULL REMISSION: ICD-10-CM

## 2025-06-09 RX ORDER — TIRZEPATIDE 5 MG/.5ML
INJECTION, SOLUTION SUBCUTANEOUS
Qty: 2 ML | Refills: 3 | Status: SHIPPED | OUTPATIENT
Start: 2025-06-09

## 2025-06-09 RX ORDER — BUPROPION HYDROCHLORIDE 300 MG/1
300 TABLET ORAL DAILY
Qty: 30 TABLET | Refills: 5 | Status: SHIPPED | OUTPATIENT
Start: 2025-06-09

## 2025-06-09 NOTE — TELEPHONE ENCOUNTER
Isha Edward is calling to request a refill on the following medication(s):    Last Visit Date (If Applicable):  6/3/2025    Next Visit Date:    Visit date not found    Medication Request:  Requested Prescriptions     Pending Prescriptions Disp Refills    buPROPion (WELLBUTRIN XL) 300 MG extended release tablet [Pharmacy Med Name: buPROPion HCL  MG TABLET] 30 tablet 5     Sig: TAKE 1 TABLET BY MOUTH DAILY    MOUNJARO 5 MG/0.5ML SOAJ pen [Pharmacy Med Name: MOUNJARO 5 MG/0.5 ML PEN] 2 mL 3     Sig: INJECT 5 MG UNDER THE SKIN ONCE WEEKLY

## 2025-06-10 DIAGNOSIS — R70.0 ELEVATED SED RATE: Primary | ICD-10-CM

## 2025-09-02 ENCOUNTER — PHARMACY VISIT (OUTPATIENT)
Age: 40
End: 2025-09-02

## 2025-09-02 VITALS — WEIGHT: 270.6 LBS | BODY MASS INDEX: 47.93 KG/M2

## 2025-09-02 DIAGNOSIS — E11.9 TYPE 2 DIABETES MELLITUS WITHOUT COMPLICATION, WITH LONG-TERM CURRENT USE OF INSULIN (HCC): Primary | ICD-10-CM

## 2025-09-02 DIAGNOSIS — Z79.4 TYPE 2 DIABETES MELLITUS WITHOUT COMPLICATION, WITH LONG-TERM CURRENT USE OF INSULIN (HCC): Primary | ICD-10-CM

## 2025-09-02 LAB — HBA1C MFR BLD: 6 %

## (undated) DEVICE — TAPE ADH W4INXL5YD WHT COT E BNDG RUB BASE CURAD

## (undated) DEVICE — ELECTRODE PT RET AD L9FT HI MOIST COND ADH HYDRGEL CORDED

## (undated) DEVICE — FORCEPS BX L240CM WRK CHN 2.8MM STD CAP W/ NDL MIC MESH

## (undated) DEVICE — DRAPE,REIN 53X77,STERILE: Brand: MEDLINE

## (undated) DEVICE — UNDERPANTS MAT L XL SEAMLESS CLR CODE WAISTBAND KNIT

## (undated) DEVICE — Z DISCONTINUED USE 2270995 CATHETER URETH 16FR L16IN RED RUB INTMIT RND HLLW TIP 2 OPP

## (undated) DEVICE — DEVICE TRCR 12X9X3IN WHT CLSR DISP OMNICLOSE

## (undated) DEVICE — Z DISCONTINUED USE 2624852 GLOVE SURG 7 PF TEXT NEOPRNE BRN STRL NEOLON 2G LF

## (undated) DEVICE — NEEDLE INSUF L120MM DIA2MM DISP FOR PNEUMOPERI ENDOPATH

## (undated) DEVICE — STRAP ARMBRD W1.5XL32IN FOAM STR YET SFT W/ HK AND LOOP

## (undated) DEVICE — GLOVE SURG SZ 65 L12IN THK75MIL DK GRN LTX FREE

## (undated) DEVICE — SEAL

## (undated) DEVICE — ARM DRAPE

## (undated) DEVICE — TROCAR: Brand: KII SHIELDED BLADED ACCESS SYSTEM

## (undated) DEVICE — KIT,ANTI FOG,W/SPONGE & FLUID,SOFT PACK: Brand: MEDLINE

## (undated) DEVICE — SKIN AFFIX SURG ADHESIVE 72/CS 0.55ML: Brand: MEDLINE

## (undated) DEVICE — ELECTRO LUBE IS A SINGLE PATIENT USE DEVICE THAT IS INTENDED TO BE USED ON ELECTROSURGICAL ELECTRODES TO REDUCE STICKING.: Brand: KEY SURGICAL ELECTRO LUBE

## (undated) DEVICE — GLOVE SURG SZ 65 L12IN FNGR THK87MIL WHT LTX FREE

## (undated) DEVICE — Z DISC USE 2220241 SUTURE SZ 0 27IN 5/8 CIR UR-6  TAPER PT VIOLET ABSRB VICRYL J603H

## (undated) DEVICE — PAD,SANITARY,11 IN,MAXI,W/WINGS,N-STRL: Brand: MEDLINE

## (undated) DEVICE — GOWN,SIRUS,NON REINFRCD,LARGE,SET IN SL: Brand: MEDLINE

## (undated) DEVICE — SUCTION IRRIGATOR: Brand: ENDOWRIST

## (undated) DEVICE — CHLORAPREP 26ML ORANGE

## (undated) DEVICE — INTENDED FOR TISSUE SEPARATION, AND OTHER PROCEDURES THAT REQUIRE A SHARP SURGICAL BLADE TO PUNCTURE OR CUT.: Brand: BARD-PARKER ® CARBON RIB-BACK BLADES

## (undated) DEVICE — TROCAR: Brand: KII FIOS FIRST ENTRY

## (undated) DEVICE — 1016 S-DRAPE IRRIG POUCH 10/BOX: Brand: STERI-DRAPE™

## (undated) DEVICE — STAZ ENDO KIT: Brand: MEDLINE INDUSTRIES, INC.

## (undated) DEVICE — VESSEL SEALER EXTEND: Brand: ENDOWRIST

## (undated) DEVICE — 4-PORT MANIFOLD: Brand: NEPTUNE 2

## (undated) DEVICE — POSITIONER HD W8XH4XL8.5IN RASPBERRY FOAM SLT

## (undated) DEVICE — SUTURE MCRYL SZ 4-0 L27IN ABSRB UD L19MM PS-2 1/2 CIR PRIM Y426H

## (undated) DEVICE — ANCHOR TISSUE RETRIEVAL SYSTEM, BAG SIZE 125 ML, PORT SIZE 8 MM: Brand: ANCHOR TISSUE RETRIEVAL SYSTEM

## (undated) DEVICE — 20 ML SYRINGE LUER-LOCK TIP: Brand: MONOJECT

## (undated) DEVICE — TRAY PREP DRY W/ PREM GLV 2 APPL 6 SPNG 2 UNDPD 1 OVERWRAP

## (undated) DEVICE — INSUFFLATION NEEDLE TO ESTABLISH PNEUMOPERITONEUM.: Brand: INSUFFLATION NEEDLE

## (undated) DEVICE — GLOVE SURG SZ 7 L12IN FNGR THK87MIL WHT LTX FREE

## (undated) DEVICE — TOWEL,OR,DSP,ST,BLUE,STD,4/PK,20PK/CS: Brand: MEDLINE

## (undated) DEVICE — TAPE ADH W3INXL10YD PLAS TRNSPAR H2O RESIST HYPOALRG CURAD

## (undated) DEVICE — BLADELESS OBTURATOR: Brand: WECK VISTA

## (undated) DEVICE — STAZ ROBOT: Brand: MEDLINE INDUSTRIES, INC.

## (undated) DEVICE — 3M™ WARMING BLANKET, UPPER BODY, 10 PER CASE, 42268: Brand: BAIR HUGGER™

## (undated) DEVICE — ZAMI/KRONNER 4.5, UTERINE MANIPULATOR/INJECTOR, 12/BX: Brand: MARINA MEDICAL

## (undated) DEVICE — BANDAGE ADH W1XL3IN UNIV PLAS NAT GEN USE STRP

## (undated) DEVICE — NEEDLE HYPO 25GA L1.5IN BLU POLYPR HUB S STL REG BVL STR

## (undated) DEVICE — SOLUTION IV 1000ML 0.9% SOD CHL PH 5 INJ USP VIAFLX PLAS

## (undated) DEVICE — DUAL LUMEN STOMACH TUBE: Brand: SALEM SUMP

## (undated) DEVICE — BLADE CLIPPER GEN PURP NS

## (undated) DEVICE — LAPAROSCOPIC SCISSORS: Brand: EPIX LAPAROSCOPIC SCISSORS

## (undated) DEVICE — SYRINGE, LUER LOCK, 10ML: Brand: MEDLINE

## (undated) DEVICE — SEALER ENDOSCP L37CM NANO COAT BLNT TIP LAP DIV

## (undated) DEVICE — CATHETER,URETHRAL,REDRUBBER,STRL,16FR: Brand: MEDLINE

## (undated) DEVICE — Device

## (undated) DEVICE — STRIP,CLOSURE,WOUND,MEDI-STRIP,1/2X4: Brand: MEDLINE